# Patient Record
Sex: FEMALE | Race: WHITE | NOT HISPANIC OR LATINO | Employment: OTHER | URBAN - METROPOLITAN AREA
[De-identification: names, ages, dates, MRNs, and addresses within clinical notes are randomized per-mention and may not be internally consistent; named-entity substitution may affect disease eponyms.]

---

## 2017-03-15 PROBLEM — M25.561 ACUTE PAIN OF RIGHT KNEE: Status: ACTIVE | Noted: 2017-03-15

## 2017-03-28 PROBLEM — S83.271A COMPLEX TEAR OF LATERAL MENISCUS OF RIGHT KNEE AS CURRENT INJURY: Status: ACTIVE | Noted: 2017-03-28

## 2018-03-20 PROBLEM — K20.0 EOSINOPHILIC ESOPHAGITIS: Status: ACTIVE | Noted: 2018-03-20

## 2018-03-20 PROBLEM — R10.9 ABDOMINAL SPASMS: Status: ACTIVE | Noted: 2018-03-20

## 2018-03-20 PROBLEM — I10 BENIGN ESSENTIAL HTN: Status: ACTIVE | Noted: 2018-03-20

## 2018-03-20 PROBLEM — E78.5 HYPERLIPIDEMIA: Status: ACTIVE | Noted: 2018-03-20

## 2018-03-20 PROBLEM — K21.9 GERD (GASTROESOPHAGEAL REFLUX DISEASE): Status: ACTIVE | Noted: 2018-03-20

## 2018-03-20 PROBLEM — J45.20 MILD INTERMITTENT ASTHMA WITHOUT COMPLICATION: Status: ACTIVE | Noted: 2018-03-20

## 2018-03-20 PROBLEM — F41.8 SITUATIONAL ANXIETY: Status: ACTIVE | Noted: 2018-03-20

## 2018-03-20 PROBLEM — Z78.0 MENOPAUSE: Status: ACTIVE | Noted: 2018-03-20

## 2018-03-28 PROBLEM — M85.89 OSTEOPENIA OF MULTIPLE SITES: Status: ACTIVE | Noted: 2018-03-28

## 2019-02-12 PROBLEM — I70.0 ATHEROSCLEROSIS OF ABDOMINAL AORTA: Status: ACTIVE | Noted: 2019-02-12

## 2019-03-28 ENCOUNTER — TELEPHONE (OUTPATIENT)
Dept: OTOLARYNGOLOGY | Facility: CLINIC | Age: 74
End: 2019-03-28

## 2019-03-28 NOTE — TELEPHONE ENCOUNTER
----- Message from Kelly Avila sent at 3/28/2019  3:30 PM CDT -----  Contact: self   Patient want to speak with a nurse regarding scheduling appointment today for dizziness please call back at [phone] (home)

## 2019-04-01 ENCOUNTER — OFFICE VISIT (OUTPATIENT)
Dept: OTOLARYNGOLOGY | Facility: CLINIC | Age: 74
End: 2019-04-01
Payer: MEDICARE

## 2019-04-01 ENCOUNTER — CLINICAL SUPPORT (OUTPATIENT)
Dept: AUDIOLOGY | Facility: CLINIC | Age: 74
End: 2019-04-01
Payer: MEDICARE

## 2019-04-01 VITALS
BODY MASS INDEX: 27.55 KG/M2 | HEIGHT: 62 IN | SYSTOLIC BLOOD PRESSURE: 125 MMHG | WEIGHT: 149.69 LBS | DIASTOLIC BLOOD PRESSURE: 80 MMHG

## 2019-04-01 DIAGNOSIS — H90.3 BILATERAL HIGH FREQUENCY SENSORINEURAL HEARING LOSS: Primary | ICD-10-CM

## 2019-04-01 DIAGNOSIS — J30.9 ALLERGIC RHINITIS, UNSPECIFIED SEASONALITY, UNSPECIFIED TRIGGER: ICD-10-CM

## 2019-04-01 DIAGNOSIS — H93.12 TINNITUS, LEFT: ICD-10-CM

## 2019-04-01 DIAGNOSIS — H93.19 TINNITUS, UNSPECIFIED LATERALITY: Primary | ICD-10-CM

## 2019-04-01 DIAGNOSIS — H93.12 LEFT-SIDED TINNITUS: ICD-10-CM

## 2019-04-01 PROCEDURE — 3074F PR MOST RECENT SYSTOLIC BLOOD PRESSURE < 130 MM HG: ICD-10-PCS | Mod: CPTII,S$GLB,, | Performed by: NURSE PRACTITIONER

## 2019-04-01 PROCEDURE — 3079F PR MOST RECENT DIASTOLIC BLOOD PRESSURE 80-89 MM HG: ICD-10-PCS | Mod: CPTII,S$GLB,, | Performed by: NURSE PRACTITIONER

## 2019-04-01 PROCEDURE — 99999 PR PBB SHADOW E&M-EST. PATIENT-LVL I: ICD-10-PCS | Mod: PBBFAC,,,

## 2019-04-01 PROCEDURE — 92557 PR COMPREHENSIVE HEARING TEST: ICD-10-PCS | Mod: S$GLB,,, | Performed by: AUDIOLOGIST-HEARING AID FITTER

## 2019-04-01 PROCEDURE — 92557 COMPREHENSIVE HEARING TEST: CPT | Mod: S$GLB,,, | Performed by: AUDIOLOGIST-HEARING AID FITTER

## 2019-04-01 PROCEDURE — 99999 PR PBB SHADOW E&M-EST. PATIENT-LVL I: CPT | Mod: PBBFAC,,,

## 2019-04-01 PROCEDURE — 3079F DIAST BP 80-89 MM HG: CPT | Mod: CPTII,S$GLB,, | Performed by: NURSE PRACTITIONER

## 2019-04-01 PROCEDURE — 99999 PR PBB SHADOW E&M-EST. PATIENT-LVL IV: CPT | Mod: PBBFAC,,, | Performed by: NURSE PRACTITIONER

## 2019-04-01 PROCEDURE — 99203 PR OFFICE/OUTPT VISIT, NEW, LEVL III, 30-44 MIN: ICD-10-PCS | Mod: S$GLB,,, | Performed by: NURSE PRACTITIONER

## 2019-04-01 PROCEDURE — 1101F PR PT FALLS ASSESS DOC 0-1 FALLS W/OUT INJ PAST YR: ICD-10-PCS | Mod: CPTII,S$GLB,, | Performed by: NURSE PRACTITIONER

## 2019-04-01 PROCEDURE — 99999 PR PBB SHADOW E&M-EST. PATIENT-LVL IV: ICD-10-PCS | Mod: PBBFAC,,, | Performed by: NURSE PRACTITIONER

## 2019-04-01 PROCEDURE — 92567 TYMPANOMETRY: CPT | Mod: S$GLB,,, | Performed by: AUDIOLOGIST-HEARING AID FITTER

## 2019-04-01 PROCEDURE — 1101F PT FALLS ASSESS-DOCD LE1/YR: CPT | Mod: CPTII,S$GLB,, | Performed by: NURSE PRACTITIONER

## 2019-04-01 PROCEDURE — 92567 PR TYMPA2METRY: ICD-10-PCS | Mod: S$GLB,,, | Performed by: AUDIOLOGIST-HEARING AID FITTER

## 2019-04-01 PROCEDURE — 3074F SYST BP LT 130 MM HG: CPT | Mod: CPTII,S$GLB,, | Performed by: NURSE PRACTITIONER

## 2019-04-01 PROCEDURE — 99203 OFFICE O/P NEW LOW 30 MIN: CPT | Mod: S$GLB,,, | Performed by: NURSE PRACTITIONER

## 2019-04-01 NOTE — PATIENT INSTRUCTIONS
Tinnitus (Ringing in the Ears)     Treatment may include maskers and hearing aids.     Tinnitus is the term for a noise in your ear not caused by an outside sound. The noise might be a ringing, clicking, hiss, or roar. It can vary in pitch and may be soft or quite loud. For some people, tinnitus is a minor nuisance. But for others, the noise can make it hard to hear, work, and even sleep. When tinnitus can't be cured, a number of treatments may offer relief.  What causes tinnitus?  Loud noises, hearing loss, and ear wax can cause tinnitus. So can certain medicines. Large amounts of aspirin or caffeine are sometimes to blame. In many cases, the exact cause of tinnitus is unknown.  How is tinnitus treated?  Identifying and removing the cause is the best way to treat tinnitus. For that reason, your healthcare provider may refer you to an otolaryngologist (ear, nose, and throat doctor). Your hearing may also be checked by an audiologist (hearing specialist). If you have hearing loss, wearing a hearing aid may help your tinnitus. When the cause can't be found, the tinnitus itself may be treated. Some of the treatments are listed below, and your healthcare provider can tell you more about them:  · Maskers are small devices that look like hearing aids. They emit a pleasant sound that helps cover up the ringing in your ears. Hearing aids and maskers are sometimes used together.  · Medicines that treat anxiety and depression may ease tinnitus in some people.  · Hypnosis or relaxation therapy may help head noise seem less severe.  · Tinnitus retraining therapy combines counseling and maskers. Both can help take your mind off the tinnitus.  Tinnitus measures:  1.  Avoid exposure to loud sounds and noises.  Wear ear protection around loud noises.  2.  Get your blood pressure check regularly.  If it is high, see your doctor.  3.  Decrease salt intake.  4.  Avoid stimulants such as caffeine and tobacco.  5.  Exercise daily to  "improve circulation.  6.  Get adequate rest.  Avoid fatigue.  7.  Stop worrying about the noise.  Recognize the noise as an annoyance and learned to ignore it as much as possible.  8.  Consider a trial of lipoflavanoids, slow-release niacin, or Arches' Tinnitus Formula (over-the-counter).  9.  Purchase a white noise machine to mask tinnitus.  · 10. Tinnitus Retraining Therapy by an audiologist certified in TRT.  For more information  · American Speech-Hearing-Language Association [phone] www.pascual.org  · American Tinnitus Association [phone] www.shea.org  · National [place] on Deafness and other Communication Disorders [phone] www.nidcd.nih.gov     DIFFERENT TYPES OF NASAL SPRAYS:  · Flonase / fluticasone (steroid spray) is best for stuffy, pressure, fullness.  · Astelin / azelastine (antihistamine spray) is best for itchy, drippy, sneezy.  · Atrovent / ipratropium is best for chronic watery nasal drip ("leaky faucet" nose), which may worsen with eating.    Nasal spray instructions:  Blow nose first gently to clean. Keep chin level with the floor (do not tilt head forward or back). Insert nasal spray taking caution to direct it AWAY from the middle wall inside the nose (septum) to avoid irritating nasal septum which could cause nosebleed.  Do not tilt spray up but rather flat and out along the roof of your mouth to spray. Angle the tip of the spray out slightly toward the direction of the ears; then spray. Do not take quick vigorous sniff but rather slow gentle inhalation while waiting for medication to absorb into nasal passages. Then administer second spray in same way.     Nasal saline rinse kit (use Neti pot or [employer] sinus rinse kit) -- use sterile water (boiled tap water which has cooled) or distilled bottled water. Add 1/4 teaspoon sea salt and a pinch of baking soda or a mixture packet from the maker of your sinus rinse kit.  Rinse through both sides of nose to cleanse sinus and nasal " passages, bending forward with head tilted down. Keep your mouth open, without holding your breath. Squeeze bottle gently until solution starts draining from the opposite nasal passage. After bottle is empty, blow nose very gently, without pinching nose completely, to avoid pressure on eardrums.  There are useful YouTube videos that show demonstration of how to do these properly.     Ponaris Nasal Emollient is used for the relief of: nasal congestion due to colds, nasal irritation, allergy exacerbations, nasal crusting. Specifically prepared iodized organic oils of pine, eucalyptus, peppermint, cajeput, and cottonseed. To order Ponaris: ask your pharmacist to order it for you or we carry it in our pharmacy downstairs on the first floor.       Understanding Nasal Allergies  Nasal allergies (also called allergic rhinitis) are a common health problem. They may be seasonal. This means they cause symptoms only at certain times of the year. Or they may be perennial. This means they cause symptoms all year long. Other health problems, such as asthma, often occur along with allergies as well.    What is an allergic reaction?  An allergy is a reaction to a substance called an allergen. Common allergens include:  · Wind-borne pollen  · Mold  · Dust mites  · Furry and feathered animals  · Cockroaches  Normally, allergens are harmless. But when a person has allergies, the body thinks they are harmful. The body then attacks allergens with antibodies. Antibodies are attached to special cells called mast cells. Allergens stick to the antibodies. This makes the mast cells release histamine and other chemicals. This is an allergic reaction. The chemicals irritate nearby nasal tissue. This causes nasal allergy symptoms.  Common nasal allergy symptoms  Allergies can cause nasal tissue to swell. This makes the air passages smaller. The nose may feel stuffed up. The nose may also make extra mucus, which can plug the nasal passages or  drip out of the nose. Mucus can drip down the back of the throat (postnasal drip) as well. Sinus tissue can swell. This may cause pain and headache. Common allergy symptoms include:  · Runny nose with clear, watery discharge  · Stuffy nose (nasal congestion)  · Drainage down your throat (postnasal drip)  · Sneezing  · Red, watery eyes  · Itchy nose, eyes, ears, and throat  · Plugged-up ears (ear congestion)  · Sore throat  · Coughing  · Sinus pain and swelling  · Headache  It may not be allergies  Other health problems can cause symptoms like those of nasal allergies. These include:  · Nonallergic rhinitis and viruses such as colds  · Irritants and pollutants, such as strong odors or smoke  · Certain medicines  · Changes in the weather   Treatment  Your healthcare provider will evaluate you to find the cause of your symptoms then recommend treatment. If your symptoms are due to nasal allergies, your healthcare provider may prescribe nasal steroid sprays or oral antihistamines to help reduce symptoms. Avoidance of the allergen will also be suggested. You may also be referred to an allergist.   Date Last Reviewed: 10/1/2016  © 7206-1637 [employer]. [address] [place] [address], [place] [address]. All rights reserved. This information is not intended as a substitute for professional medical care. Always follow your healthcare professional's instructions.

## 2019-04-01 NOTE — PROGRESS NOTES
Elham Arteaga was seen 04/01/2019 for an audiological evaluation. Patient complains of hearing loss and tinnitus AS since last Thursday, 3/28/19. Pt reports a moderate hearing loss Dx by Dr Nguyen on 9/12/16.     Results reveal a normal through 1.5K Hz sloping to moderate HF sensorineural hearing loss for the right ear, and  Normal through 1.5KHz sloping to moderately severe HF sensorineural hearing loss for the left ear.    Speech Reception Thresholds were  10 dBHL for the right ear and 5 dBHL for the left ear.    Word recognition scores were excellent for the right ear and excellent for the left ear.   Tympanograms were Type A for the right ear and Type A for the left ear.    Audiogram results were reviewed in detail with patient and all questions were answered. Results will be reviewed by ENT at the completion of this note. Recommend further medical eval for the asymmetry, binaural amplification pending medical clearance, repeat hearing test in one year due to asymmetry and hearing protection in loud noise.

## 2019-04-01 NOTE — PROGRESS NOTES
Subjective:       Patient ID: Elham Arteaga is a 73 y.o. female.    Chief Complaint: Tinnitus (allergies)    HPI   Patient states she noticed loud tinnitus AS after waking on Thursday. Lasted hours. The next day it was barely noticeable. She discussed it with her PCP who suspects it may have been related to several antibiotics she had to take this winter for protracted URIs. She states she has always had terrible allergies, but they seem to be worse this year. 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     Review of Systems   Constitutional: Negative.    HENT: Positive for tinnitus.    Eyes: Negative.    Respiratory: Negative.    Cardiovascular: Negative.    Gastrointestinal: Negative.    Musculoskeletal: Negative.    Skin: Negative.    Neurological: Negative.    Hematological: Negative.    Psychiatric/Behavioral: Negative.        Objective:      Physical Exam   Constitutional: She is oriented to person, place, and time. Vital signs are normal. She appears well-developed and well-nourished. She is cooperative. She does not appear ill. No distress.   HENT:   Head: Normocephalic and atraumatic.   Right Ear: Hearing, tympanic membrane, external ear and ear canal normal. Tympanic membrane is not erythematous. No middle ear effusion.   Left Ear: Hearing, tympanic membrane, external ear and ear canal normal. Tympanic membrane is not erythematous.  No middle ear effusion.   Nose: Nose normal.   Mouth/Throat: Uvula is midline, oropharynx is clear and moist and mucous membranes are normal.   Eyes: EOM and lids are normal. Right eye exhibits no discharge. Left eye exhibits no discharge. No scleral icterus.   Neck: Trachea normal and normal range of motion. Neck supple. No tracheal deviation present.   Cardiovascular: Normal rate.   Pulmonary/Chest: Effort normal. No stridor. No respiratory distress. She has no wheezes.   Musculoskeletal: Normal range of  motion.   Neurological: She is alert and oriented to person, place, and time. She has normal strength. Coordination and gait normal.   Skin: Skin is warm, dry and intact. She is not diaphoretic. No cyanosis. No pallor.   Psychiatric: She has a normal mood and affect. Her speech is normal and behavior is normal. Judgment and thought content normal. Cognition and memory are normal.   Nursing note and vitals reviewed.      Assessment:     Asymmetrical high-frequency SNHL    Negative aural exam  Allergic rhinitis  Plan:     Repeat audiogram in one year to monitor asymmetry    Tinnitus handout given and discussed at length. Discussed elimination of exacerbating factors such as elevated blood pressure, high sodium intake, caffeine/stimulants, sleep deprivation/insomnia, certain medications, exposure to loud sounds, etc. Discussed white noise, hearing aids w/masking technology, and tinnitus retraining therapy (TRT). All questions answered.   PATIENT IS MEDICALLY CLEARED FOR HEARING AIDS.   Today's audiogram reveals the patient is a candidate for amplification. Audiogram is reviewed in detail with the patient. The audiologist's recommendation that the patient have amplification/hearing aids is discussed and questions answered. Patient has been given information by the audiologist on how to schedule a hearing aid consultation. Patient is encouraged to wear ear protection in loud noise and return annually for hearing test.   Flonase and Astelin. Nasal saline rinsing.   Return to clinic as needed for further ENT concerns.

## 2019-05-13 PROBLEM — Z88.8 ALLERGY TO STATIN MEDICATION: Status: ACTIVE | Noted: 2019-05-13

## 2019-05-13 PROBLEM — F32.9 REACTIVE DEPRESSION: Status: ACTIVE | Noted: 2019-05-13

## 2019-08-23 ENCOUNTER — LAB VISIT (OUTPATIENT)
Dept: LAB | Facility: HOSPITAL | Age: 74
End: 2019-08-23
Attending: PHYSICIAN ASSISTANT
Payer: MEDICARE

## 2019-08-23 ENCOUNTER — TELEPHONE (OUTPATIENT)
Dept: RHEUMATOLOGY | Facility: CLINIC | Age: 74
End: 2019-08-23

## 2019-08-23 ENCOUNTER — TELEPHONE (OUTPATIENT)
Dept: ENDOCRINOLOGY | Facility: CLINIC | Age: 74
End: 2019-08-23

## 2019-08-23 ENCOUNTER — OFFICE VISIT (OUTPATIENT)
Dept: ENDOCRINOLOGY | Facility: CLINIC | Age: 74
End: 2019-08-23
Payer: MEDICARE

## 2019-08-23 VITALS
SYSTOLIC BLOOD PRESSURE: 128 MMHG | TEMPERATURE: 98 F | HEART RATE: 96 BPM | WEIGHT: 151.38 LBS | HEIGHT: 62 IN | DIASTOLIC BLOOD PRESSURE: 78 MMHG | BODY MASS INDEX: 27.86 KG/M2

## 2019-08-23 DIAGNOSIS — E78.5 HYPERLIPIDEMIA, UNSPECIFIED HYPERLIPIDEMIA TYPE: ICD-10-CM

## 2019-08-23 DIAGNOSIS — E03.9 HYPOTHYROIDISM, UNSPECIFIED TYPE: ICD-10-CM

## 2019-08-23 DIAGNOSIS — Z13.220 SCREENING CHOLESTEROL LEVEL: ICD-10-CM

## 2019-08-23 DIAGNOSIS — E83.52 HYPERCALCEMIA: ICD-10-CM

## 2019-08-23 DIAGNOSIS — E04.1 NODULAR THYROID DISEASE: ICD-10-CM

## 2019-08-23 DIAGNOSIS — E55.9 HYPOVITAMINOSIS D: ICD-10-CM

## 2019-08-23 DIAGNOSIS — E83.52 HYPERCALCEMIA: Primary | ICD-10-CM

## 2019-08-23 DIAGNOSIS — M25.50 ARTHRALGIA, UNSPECIFIED JOINT: ICD-10-CM

## 2019-08-23 LAB
ALBUMIN SERPL BCP-MCNC: 3.9 G/DL (ref 3.5–5.2)
ALP SERPL-CCNC: 131 U/L (ref 55–135)
ALT SERPL W/O P-5'-P-CCNC: 17 U/L (ref 10–44)
ANION GAP SERPL CALC-SCNC: 7 MMOL/L (ref 8–16)
AST SERPL-CCNC: 15 U/L (ref 10–40)
BASOPHILS # BLD AUTO: 0.05 K/UL (ref 0–0.2)
BASOPHILS NFR BLD: 0.5 % (ref 0–1.9)
BILIRUB SERPL-MCNC: 0.5 MG/DL (ref 0.1–1)
BUN SERPL-MCNC: 20 MG/DL (ref 8–23)
CA-I BLDV-SCNC: 1.35 MMOL/L (ref 1.06–1.42)
CALCIUM SERPL-MCNC: 10.3 MG/DL (ref 8.7–10.5)
CHLORIDE SERPL-SCNC: 104 MMOL/L (ref 95–110)
CHOLEST SERPL-MCNC: 235 MG/DL (ref 120–199)
CHOLEST/HDLC SERPL: 4.4 {RATIO} (ref 2–5)
CO2 SERPL-SCNC: 28 MMOL/L (ref 23–29)
CREAT SERPL-MCNC: 0.8 MG/DL (ref 0.5–1.4)
DIFFERENTIAL METHOD: ABNORMAL
EOSINOPHIL # BLD AUTO: 0.3 K/UL (ref 0–0.5)
EOSINOPHIL NFR BLD: 2.9 % (ref 0–8)
ERYTHROCYTE [DISTWIDTH] IN BLOOD BY AUTOMATED COUNT: 13.7 % (ref 11.5–14.5)
EST. GFR  (AFRICAN AMERICAN): >60 ML/MIN/1.73 M^2
EST. GFR  (NON AFRICAN AMERICAN): >60 ML/MIN/1.73 M^2
GLUCOSE SERPL-MCNC: 104 MG/DL (ref 70–110)
HCT VFR BLD AUTO: 44.9 % (ref 37–48.5)
HDLC SERPL-MCNC: 54 MG/DL (ref 40–75)
HDLC SERPL: 23 % (ref 20–50)
HGB BLD-MCNC: 13.6 G/DL (ref 12–16)
IMM GRANULOCYTES # BLD AUTO: 0.03 K/UL (ref 0–0.04)
IMM GRANULOCYTES NFR BLD AUTO: 0.3 % (ref 0–0.5)
LDLC SERPL CALC-MCNC: 129.2 MG/DL (ref 63–159)
LYMPHOCYTES # BLD AUTO: 2.2 K/UL (ref 1–4.8)
LYMPHOCYTES NFR BLD: 24.3 % (ref 18–48)
MAGNESIUM SERPL-MCNC: 2.1 MG/DL (ref 1.6–2.6)
MCH RBC QN AUTO: 27.6 PG (ref 27–31)
MCHC RBC AUTO-ENTMCNC: 30.3 G/DL (ref 32–36)
MCV RBC AUTO: 91 FL (ref 82–98)
MONOCYTES # BLD AUTO: 0.6 K/UL (ref 0.3–1)
MONOCYTES NFR BLD: 6.4 % (ref 4–15)
NEUTROPHILS # BLD AUTO: 6 K/UL (ref 1.8–7.7)
NEUTROPHILS NFR BLD: 65.6 % (ref 38–73)
NONHDLC SERPL-MCNC: 181 MG/DL
NRBC BLD-RTO: 0 /100 WBC
PHOSPHATE SERPL-MCNC: 3.5 MG/DL (ref 2.7–4.5)
PLATELET # BLD AUTO: 298 K/UL (ref 150–350)
PMV BLD AUTO: 9.2 FL (ref 9.2–12.9)
POTASSIUM SERPL-SCNC: 4.3 MMOL/L (ref 3.5–5.1)
PROT SERPL-MCNC: 7.2 G/DL (ref 6–8.4)
PTH-INTACT SERPL-MCNC: 84 PG/ML (ref 9–77)
RBC # BLD AUTO: 4.93 M/UL (ref 4–5.4)
SODIUM SERPL-SCNC: 139 MMOL/L (ref 136–145)
T3 SERPL-MCNC: 94 NG/DL (ref 60–180)
T4 FREE SERPL-MCNC: 0.95 NG/DL (ref 0.71–1.51)
THYROPEROXIDASE IGG SERPL-ACNC: <6 IU/ML
TRIGL SERPL-MCNC: 259 MG/DL (ref 30–150)
TSH SERPL DL<=0.005 MIU/L-ACNC: 1.55 UIU/ML (ref 0.4–4)
WBC # BLD AUTO: 9.21 K/UL (ref 3.9–12.7)

## 2019-08-23 PROCEDURE — 99214 OFFICE O/P EST MOD 30 MIN: CPT | Mod: S$GLB,,, | Performed by: PHYSICIAN ASSISTANT

## 2019-08-23 PROCEDURE — 3078F DIAST BP <80 MM HG: CPT | Mod: CPTII,S$GLB,, | Performed by: PHYSICIAN ASSISTANT

## 2019-08-23 PROCEDURE — 3074F PR MOST RECENT SYSTOLIC BLOOD PRESSURE < 130 MM HG: ICD-10-PCS | Mod: CPTII,S$GLB,, | Performed by: PHYSICIAN ASSISTANT

## 2019-08-23 PROCEDURE — 83735 ASSAY OF MAGNESIUM: CPT

## 2019-08-23 PROCEDURE — 86376 MICROSOMAL ANTIBODY EACH: CPT

## 2019-08-23 PROCEDURE — 1101F PT FALLS ASSESS-DOCD LE1/YR: CPT | Mod: CPTII,S$GLB,, | Performed by: PHYSICIAN ASSISTANT

## 2019-08-23 PROCEDURE — 99214 PR OFFICE/OUTPT VISIT, EST, LEVL IV, 30-39 MIN: ICD-10-PCS | Mod: S$GLB,,, | Performed by: PHYSICIAN ASSISTANT

## 2019-08-23 PROCEDURE — 82330 ASSAY OF CALCIUM: CPT

## 2019-08-23 PROCEDURE — 84443 ASSAY THYROID STIM HORMONE: CPT

## 2019-08-23 PROCEDURE — 99999 PR PBB SHADOW E&M-EST. PATIENT-LVL IV: ICD-10-PCS | Mod: PBBFAC,,, | Performed by: PHYSICIAN ASSISTANT

## 2019-08-23 PROCEDURE — 84432 ASSAY OF THYROGLOBULIN: CPT

## 2019-08-23 PROCEDURE — 85025 COMPLETE CBC W/AUTO DIFF WBC: CPT

## 2019-08-23 PROCEDURE — 1101F PR PT FALLS ASSESS DOC 0-1 FALLS W/OUT INJ PAST YR: ICD-10-PCS | Mod: CPTII,S$GLB,, | Performed by: PHYSICIAN ASSISTANT

## 2019-08-23 PROCEDURE — 84480 ASSAY TRIIODOTHYRONINE (T3): CPT

## 2019-08-23 PROCEDURE — 3074F SYST BP LT 130 MM HG: CPT | Mod: CPTII,S$GLB,, | Performed by: PHYSICIAN ASSISTANT

## 2019-08-23 PROCEDURE — 3078F PR MOST RECENT DIASTOLIC BLOOD PRESSURE < 80 MM HG: ICD-10-PCS | Mod: CPTII,S$GLB,, | Performed by: PHYSICIAN ASSISTANT

## 2019-08-23 PROCEDURE — 83970 ASSAY OF PARATHORMONE: CPT

## 2019-08-23 PROCEDURE — 80053 COMPREHEN METABOLIC PANEL: CPT

## 2019-08-23 PROCEDURE — 80061 LIPID PANEL: CPT

## 2019-08-23 PROCEDURE — 84100 ASSAY OF PHOSPHORUS: CPT

## 2019-08-23 PROCEDURE — 84439 ASSAY OF FREE THYROXINE: CPT

## 2019-08-23 PROCEDURE — 99999 PR PBB SHADOW E&M-EST. PATIENT-LVL IV: CPT | Mod: PBBFAC,,, | Performed by: PHYSICIAN ASSISTANT

## 2019-08-23 PROCEDURE — 36415 COLL VENOUS BLD VENIPUNCTURE: CPT | Mod: PO

## 2019-08-23 RX ORDER — LANOLIN ALCOHOL/MO/W.PET/CERES
100 CREAM (GRAM) TOPICAL DAILY
COMMUNITY
End: 2021-12-31

## 2019-08-23 RX ORDER — HYDROGEN PEROXIDE 3 %
20 SOLUTION, NON-ORAL MISCELLANEOUS DAILY
COMMUNITY
End: 2021-08-16

## 2019-08-23 NOTE — TELEPHONE ENCOUNTER
Patient has a referral to Rheumatology. Has joint pain. Wants to be seen in [place]. Not able to book anything. Would you please call patient to make a appointment.

## 2019-08-23 NOTE — PROGRESS NOTES
"CC: Hypothyroidism/Hypercalcemia    HPI: Elham Arteaga is a 73 y.o. female here for hypercalcemia/hypothyroidism along with other conditions listed in the Visit Diagnosis. Diagnosed with hypothyroidism in 2014. Noticed her ca was elevated in 5/19 at 10.5 by her PCP. Ca found to be 10.0% a few days later. +FHx of thyroid disease in her mother (goiter) and aunt. + MTHFR mutation (2 copies of the same mutation).  Previous pt of Dr. Nance. She has vb12 deficiency and takes 1000 mcg daily. Complains of nausea, fatigue, muscle aches, numbness and tingling in fingers and feet, memory fog, bentyl. Taking 0.25 mg M-F. Not taking vitamin d. Levothyroxine is Timeful, sends prescription to [facility] pharmacy. No hx of kidney stones. Avoids milk and cheese due to lactose intolerance.  Thyroid u/s 8/18 showed a 4 mm nodule in the isthmus.    DEXA 3/18: osteopenia at [facility]. No falls, fractures or steriod injections. Not taking ca or vd.    PMHx, PSHx: reviewed in epic.    Social Hx: no ETOH use. Social smoker in the past.     ROS:   Constitutional: No recent significant weight change  Eyes: No recent visual changes  Cardiovascular: Denies current anginal symptoms  Respiratory: Denies current respiratory difficulty  Gastrointestinal: Denies recent bowel disturbances  GenitoUrinary - No dysuria  Skin: No new skin rash  Musc: + back and hip pain  Neurologic: No focal neurologic complaints  Remainder ROS negative     /78 (BP Location: Left arm, Patient Position: Sitting, BP Method: Medium (Manual))   Pulse 96   Temp 97.6 °F (36.4 °C) (Oral)   Ht 5' 2" (1.575 m)   Wt 68.6 kg (151 lb 5.5 oz)   BMI 27.68 kg/m²      Personally reviewed labs below:    5/27/19 [facility]  PTHi 62  TSH 0.37  FT3 3.7  FT4 1.3  vd 28  cmp wnl  Lab Results   Component Value Date    TSH 1.548 08/23/2019    Q0VRCMG 94 08/23/2019    FREET4 0.95 08/23/2019        Lab Results   Component Value Date    PTH 84.0 (H) 08/23/2019    CALCIUM 10.3 08/23/2019    CAION " 1.35 08/23/2019    PHOS 3.5 08/23/2019        Chemistry        Component Value Date/Time     05/23/2019 2110    K 4.2 05/23/2019 2110     05/23/2019 2110    CO2 27 05/23/2019 2110    BUN 20 (H) 05/23/2019 2110    CREATININE 0.67 05/23/2019 2110    GLU 95 05/23/2019 2110        Component Value Date/Time    CALCIUM 10.3 08/23/2019 1142    ALKPHOS 131 08/23/2019 1142    AST 15 08/23/2019 1142    ALT 17 08/23/2019 1142    BILITOT 0.5 08/23/2019 1142    ESTGFRAFRICA >60.0 08/23/2019 1142    EGFRNONAA >60.0 08/23/2019 1142           No results found for: HGBA1C     PE:  GENERAL: elderly female, well developed, well nourished  NECK: Supple neck, normal thyroid. No bruit  LYMPHATIC: No cervical or supraclavicular lymphadenopathy  CARDIOVASCULAR: Normal heart sounds, no pedal edema  RESPIRATORY: Normal effort, clear to auscultation  ABDOMEN: soft, non-tender, non-distended.  FEET: appropriate footwear.     Assessment/Plan:   1. Hypercalcemia  T4, free    T3    TSH    Thyroid peroxidase antibody    Thyroglobulin    Comprehensive metabolic panel    CBC auto differential    PTH, intact    Calcium, ionized    Magnesium    Phosphorus   2. Hypothyroidism, unspecified type     3. Screening cholesterol level     4. Hyperlipidemia, unspecified hyperlipidemia type  Lipid panel   5. Nodular thyroid disease  US Soft Tissue Head Neck Thyroid   6. Hypovitaminosis D     7. Arthralgia, unspecified joint  Ambulatory Referral to Rheumatology       Hypercalcemia-recheck pth   Hypothyroidism-TFTs today. Continue current LT4 dose.  Cholesterol-check lp  Nodular thyroid disease-repeat thyroid u/s  Hypovitaminosis d-low-restart otc vd  Arthraglia-referral to rheumaotlogy    F/u in 6 mths

## 2019-08-23 NOTE — TELEPHONE ENCOUNTER
Pt given phone number to [facility]. She declined Dr. De La Torre's next available appt

## 2019-08-24 DIAGNOSIS — E83.52 HYPERCALCEMIA: Primary | ICD-10-CM

## 2019-08-24 DIAGNOSIS — E21.3 HYPERPARATHYROIDISM: ICD-10-CM

## 2019-08-26 LAB
THRYOGLOBULIN INTERPRETATION: ABNORMAL
THYROGLOB AB SERPL-ACNC: <1.8 IU/ML
THYROGLOB SERPL-MCNC: 3 NG/ML

## 2019-08-27 ENCOUNTER — HOSPITAL ENCOUNTER (OUTPATIENT)
Dept: RADIOLOGY | Facility: HOSPITAL | Age: 74
Discharge: HOME | End: 2019-08-27
Attending: PHYSICIAN ASSISTANT
Payer: MEDICARE

## 2019-08-27 DIAGNOSIS — E04.1 NODULAR THYROID DISEASE: ICD-10-CM

## 2019-08-27 PROCEDURE — 76536 US SOFT TISSUE HEAD NECK THYROID: ICD-10-PCS | Mod: 26,,, | Performed by: RADIOLOGY

## 2019-08-27 PROCEDURE — 76536 US EXAM OF HEAD AND NECK: CPT | Mod: 26,,, | Performed by: RADIOLOGY

## 2019-08-27 PROCEDURE — 76536 US EXAM OF HEAD AND NECK: CPT | Mod: TC,PO

## 2019-08-29 ENCOUNTER — TELEPHONE (OUTPATIENT)
Dept: ENDOCRINOLOGY | Facility: CLINIC | Age: 74
End: 2019-08-29

## 2019-08-29 NOTE — TELEPHONE ENCOUNTER
----- Message from Hannah Denise MA sent at 8/29/2019  4:52 PM CDT -----  Contact: self      ----- Message -----  From: Glenis Mehta  Sent: 8/29/2019  11:01 AM  To: Toña Sullivan Staff    Type:  Patient Returning Call    Who Called:  self  Who Left Message for Patient:  Luis  Does the patient know what this is regarding?:  yes  Best Call Back Number:  [phone] (home)   Additional Information:  Patient has a few more questions regarding the results you called her with on 08/27/19. Please call patient. Thanks!

## 2019-08-29 NOTE — TELEPHONE ENCOUNTER
Called patient and informed her of the results of her recent blood work and ultrasound. Patient had questions that were answered. Patient verbalized understanding, and will turn in 24 hour urine in the next few days.

## 2019-09-04 ENCOUNTER — LAB VISIT (OUTPATIENT)
Dept: LAB | Facility: HOSPITAL | Age: 74
End: 2019-09-04
Attending: INTERNAL MEDICINE
Payer: MEDICARE

## 2019-09-04 DIAGNOSIS — E21.3 HYPERPARATHYROIDISM: ICD-10-CM

## 2019-09-04 PROCEDURE — 82340 ASSAY OF CALCIUM IN URINE: CPT

## 2019-09-04 PROCEDURE — 82570 ASSAY OF URINE CREATININE: CPT

## 2019-09-05 LAB
CALCIUM 24H UR-MRATE: 4 MG/HR (ref 4–12)
CALCIUM UR-MCNC: 5.3 MG/DL (ref 0–15)
CALCIUM URINE (MG/SPEC): 93 MG/SPEC
CREAT 24H UR-MRATE: 31.4 MG/HR (ref 40–75)
CREAT UR-MCNC: 43 MG/DL (ref 15–325)
CREATININE, URINE (MG/SPEC): 752.5 MG/SPEC
URINE COLLECTION DURATION: 24 HR
URINE COLLECTION DURATION: 24 HR
URINE VOLUME: 1750 ML
URINE VOLUME: 1750 ML

## 2019-11-21 ENCOUNTER — TELEPHONE (OUTPATIENT)
Dept: RHEUMATOLOGY | Facility: CLINIC | Age: 74
End: 2019-11-21

## 2019-11-21 NOTE — TELEPHONE ENCOUNTER
----- Message from Cecily  sent at 11/21/2019  9:58 AM CST -----  Contact: pt  Type: Needs Medical Advice    Who Called:  pt    Best Call Back Number:   Additional Information: Requesting a call back from Nurse, regarding access to an appt pt will be a NP ,please call back with advice

## 2019-12-01 PROBLEM — E55.9 VITAMIN D DEFICIENCY: Chronic | Status: ACTIVE | Noted: 2019-12-01

## 2019-12-11 ENCOUNTER — TELEPHONE (OUTPATIENT)
Dept: ENDOCRINOLOGY | Facility: CLINIC | Age: 74
End: 2019-12-11

## 2020-02-03 ENCOUNTER — OFFICE VISIT (OUTPATIENT)
Dept: ENDOCRINOLOGY | Facility: CLINIC | Age: 75
End: 2020-02-03
Payer: MEDICARE

## 2020-02-03 ENCOUNTER — LAB VISIT (OUTPATIENT)
Dept: LAB | Facility: HOSPITAL | Age: 75
End: 2020-02-03
Attending: INTERNAL MEDICINE
Payer: MEDICARE

## 2020-02-03 VITALS
WEIGHT: 144.5 LBS | BODY MASS INDEX: 26.59 KG/M2 | HEIGHT: 62 IN | DIASTOLIC BLOOD PRESSURE: 88 MMHG | TEMPERATURE: 98 F | HEART RATE: 90 BPM | SYSTOLIC BLOOD PRESSURE: 144 MMHG

## 2020-02-03 DIAGNOSIS — E83.52 HYPERCALCEMIA: ICD-10-CM

## 2020-02-03 DIAGNOSIS — I70.0 ATHEROSCLEROSIS OF ABDOMINAL AORTA: ICD-10-CM

## 2020-02-03 DIAGNOSIS — F41.8 SITUATIONAL ANXIETY: ICD-10-CM

## 2020-02-03 DIAGNOSIS — I10 BENIGN ESSENTIAL HTN: ICD-10-CM

## 2020-02-03 DIAGNOSIS — K21.9 GASTROESOPHAGEAL REFLUX DISEASE WITHOUT ESOPHAGITIS: ICD-10-CM

## 2020-02-03 DIAGNOSIS — E04.1 NODULAR THYROID DISEASE: ICD-10-CM

## 2020-02-03 DIAGNOSIS — E03.9 ACQUIRED HYPOTHYROIDISM: ICD-10-CM

## 2020-02-03 DIAGNOSIS — E55.9 VITAMIN D DEFICIENCY: Chronic | ICD-10-CM

## 2020-02-03 DIAGNOSIS — E03.9 ACQUIRED HYPOTHYROIDISM: Primary | ICD-10-CM

## 2020-02-03 DIAGNOSIS — M85.89 OSTEOPENIA OF MULTIPLE SITES: ICD-10-CM

## 2020-02-03 DIAGNOSIS — K20.0 EOSINOPHILIC ESOPHAGITIS: ICD-10-CM

## 2020-02-03 DIAGNOSIS — F32.9 REACTIVE DEPRESSION: ICD-10-CM

## 2020-02-03 DIAGNOSIS — E78.5 HYPERLIPIDEMIA, UNSPECIFIED HYPERLIPIDEMIA TYPE: ICD-10-CM

## 2020-02-03 DIAGNOSIS — Z78.0 MENOPAUSE: ICD-10-CM

## 2020-02-03 LAB
BASOPHILS # BLD AUTO: 0.09 K/UL (ref 0–0.2)
BASOPHILS NFR BLD: 0.6 % (ref 0–1.9)
DIFFERENTIAL METHOD: ABNORMAL
EOSINOPHIL # BLD AUTO: 0.2 K/UL (ref 0–0.5)
EOSINOPHIL NFR BLD: 1.4 % (ref 0–8)
ERYTHROCYTE [DISTWIDTH] IN BLOOD BY AUTOMATED COUNT: 14.3 % (ref 11.5–14.5)
HCT VFR BLD AUTO: 45.6 % (ref 37–48.5)
HGB BLD-MCNC: 13.5 G/DL (ref 12–16)
IMM GRANULOCYTES # BLD AUTO: 0.05 K/UL (ref 0–0.04)
IMM GRANULOCYTES NFR BLD AUTO: 0.4 % (ref 0–0.5)
LYMPHOCYTES # BLD AUTO: 3.2 K/UL (ref 1–4.8)
LYMPHOCYTES NFR BLD: 22.7 % (ref 18–48)
MCH RBC QN AUTO: 27.4 PG (ref 27–31)
MCHC RBC AUTO-ENTMCNC: 29.6 G/DL (ref 32–36)
MCV RBC AUTO: 93 FL (ref 82–98)
MONOCYTES # BLD AUTO: 0.7 K/UL (ref 0.3–1)
MONOCYTES NFR BLD: 5 % (ref 4–15)
NEUTROPHILS # BLD AUTO: 9.7 K/UL (ref 1.8–7.7)
NEUTROPHILS NFR BLD: 69.9 % (ref 38–73)
NRBC BLD-RTO: 0 /100 WBC
PLATELET # BLD AUTO: 383 K/UL (ref 150–350)
PMV BLD AUTO: 9.1 FL (ref 9.2–12.9)
RBC # BLD AUTO: 4.92 M/UL (ref 4–5.4)
WBC # BLD AUTO: 13.93 K/UL (ref 3.9–12.7)

## 2020-02-03 PROCEDURE — 99999 PR PBB SHADOW E&M-EST. PATIENT-LVL IV: CPT | Mod: PBBFAC,,, | Performed by: INTERNAL MEDICINE

## 2020-02-03 PROCEDURE — 80053 COMPREHEN METABOLIC PANEL: CPT

## 2020-02-03 PROCEDURE — 80061 LIPID PANEL: CPT

## 2020-02-03 PROCEDURE — 99214 PR OFFICE/OUTPT VISIT, EST, LEVL IV, 30-39 MIN: ICD-10-PCS | Mod: S$GLB,,, | Performed by: INTERNAL MEDICINE

## 2020-02-03 PROCEDURE — 1125F AMNT PAIN NOTED PAIN PRSNT: CPT | Mod: S$GLB,,, | Performed by: INTERNAL MEDICINE

## 2020-02-03 PROCEDURE — 82330 ASSAY OF CALCIUM: CPT

## 2020-02-03 PROCEDURE — 1159F MED LIST DOCD IN RCRD: CPT | Mod: S$GLB,,, | Performed by: INTERNAL MEDICINE

## 2020-02-03 PROCEDURE — 84443 ASSAY THYROID STIM HORMONE: CPT

## 2020-02-03 PROCEDURE — 84439 ASSAY OF FREE THYROXINE: CPT

## 2020-02-03 PROCEDURE — 3079F DIAST BP 80-89 MM HG: CPT | Mod: CPTII,S$GLB,, | Performed by: INTERNAL MEDICINE

## 2020-02-03 PROCEDURE — 86800 THYROGLOBULIN ANTIBODY: CPT

## 2020-02-03 PROCEDURE — 3079F PR MOST RECENT DIASTOLIC BLOOD PRESSURE 80-89 MM HG: ICD-10-PCS | Mod: CPTII,S$GLB,, | Performed by: INTERNAL MEDICINE

## 2020-02-03 PROCEDURE — 85025 COMPLETE CBC W/AUTO DIFF WBC: CPT

## 2020-02-03 PROCEDURE — 83018 HEAVY METAL QUAN EACH NES: CPT

## 2020-02-03 PROCEDURE — 3077F SYST BP >= 140 MM HG: CPT | Mod: CPTII,S$GLB,, | Performed by: INTERNAL MEDICINE

## 2020-02-03 PROCEDURE — 99214 OFFICE O/P EST MOD 30 MIN: CPT | Mod: S$GLB,,, | Performed by: INTERNAL MEDICINE

## 2020-02-03 PROCEDURE — 82306 VITAMIN D 25 HYDROXY: CPT

## 2020-02-03 PROCEDURE — 1101F PR PT FALLS ASSESS DOC 0-1 FALLS W/OUT INJ PAST YR: ICD-10-PCS | Mod: CPTII,S$GLB,, | Performed by: INTERNAL MEDICINE

## 2020-02-03 PROCEDURE — 83970 ASSAY OF PARATHORMONE: CPT

## 2020-02-03 PROCEDURE — 3077F PR MOST RECENT SYSTOLIC BLOOD PRESSURE >= 140 MM HG: ICD-10-PCS | Mod: CPTII,S$GLB,, | Performed by: INTERNAL MEDICINE

## 2020-02-03 PROCEDURE — 1159F PR MEDICATION LIST DOCUMENTED IN MEDICAL RECORD: ICD-10-PCS | Mod: S$GLB,,, | Performed by: INTERNAL MEDICINE

## 2020-02-03 PROCEDURE — 82308 ASSAY OF CALCITONIN: CPT

## 2020-02-03 PROCEDURE — 84480 ASSAY TRIIODOTHYRONINE (T3): CPT

## 2020-02-03 PROCEDURE — 1125F PR PAIN SEVERITY QUANTIFIED, PAIN PRESENT: ICD-10-PCS | Mod: S$GLB,,, | Performed by: INTERNAL MEDICINE

## 2020-02-03 PROCEDURE — 1101F PT FALLS ASSESS-DOCD LE1/YR: CPT | Mod: CPTII,S$GLB,, | Performed by: INTERNAL MEDICINE

## 2020-02-03 PROCEDURE — 99999 PR PBB SHADOW E&M-EST. PATIENT-LVL IV: ICD-10-PCS | Mod: PBBFAC,,, | Performed by: INTERNAL MEDICINE

## 2020-02-03 PROCEDURE — 84550 ASSAY OF BLOOD/URIC ACID: CPT

## 2020-02-03 PROCEDURE — 86316 IMMUNOASSAY TUMOR OTHER: CPT

## 2020-02-03 NOTE — PROGRESS NOTES
Procedures by Bhavana Del Angel MD at  2016 10:04 AM      Author:  Bhavana Del Angel MD Service:  Neurology Author Type:  Physician     Filed:  2016 10:19 AM Date of Service:  2016 10:04 AM Status:  Signed     :  Bhavana Del Angel MD (Physician)            Continuous Video EEG  Long Term Monitoring    Patient Name:  Chary Donnelly  MRN: [id number]   :  1982 File #: Isaak Ricardo    Age: 26 y o  Encounter #: [id number]   Date performed: -2016 Referring Provider: Robson Prince          Report date: 2016          Study type: Continuous video EEG, up to 24 hours    ICD 10 diagnosis: Spells/Fit NOS R56 9 and Coma R40 2    Start time: 2016 08:01  End time: 2016 00:30    Patient History:  Patient is 26 y o  male on continuous video EEG monitoring for the assessment of seizures, characterization of  events, and effect of treatment  Patient has a history of a seizure disorder or prior seizures, 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 with left epidural hematoma, parenchyma hemorrhages, and subarachoid hemorrhage  Patient is comatose with shivering movements and rigidity  concerning for seizures  Continuous EEG requested to assess for seizures  Current AEDs:  Medications include:     cefazolin 2,000 mg Intravenous Q8H   chlorhexidine 15 mL Swish & Spit Q12H [address]   clotrimazole  Topical BID   levETIRAcetam 750 mg Intravenous Q12H [address]   midazolam 2 mg Intravenous Once   pantoprazole 40 mg Intravenous Early Morning   potassium chloride 40 mEq Intravenous Once   sodium phosphate 12 mmol Intravenous Once       Description of Procedure:   A 24 hours continuous video EEG was performed with electrodes applied using the International 10-20 System at least 16 channels are reviewed and formatted into longitudinal bipolar, transverse  bipolar, and referential (to common reference or calculated common reference) montages    Additional electrodes used included Subjective:      Patient ID: Elham Atreaga is a 74 y.o. female.    Chief Complaint:  Hypothyroidism    Patient is a 74 yr old lady seen in [facility] today on account of hypothyroidism on thyroid hormone repletion therapy.    History of Present Illness    Patient is a  74 y.o. postmenopausal lady seen in [facility] today on account of hypothyroidism.  Here for hypercalcemia/hypothyroidism along with other conditions listed in the Visit Diagnosis. Diagnosed with hypothyroidism in 2014. Noticed her ca was elevated in 5/19 at 10.5 by her PCP. Ca found to be 10.0% a few days later. +FHx of thyroid disease in her mother (goiter) and aunt. + MTHFR mutation (2 copies of the same mutation).  Previous pt of Dr. Nance. She has vb12 deficiency and takes 1000 mcg daily. Complains of nausea, fatigue, muscle aches, numbness and tingling in fingers and feet, memory fog, bentyl. Taking 0.25 mg Qd. Not taking vitamin d. Levothyroxine is [facility], sends prescription to [place] [facility]. No hx of kidney stones. Avoids milk and cheese due to lactose intolerance.  Thyroid u/s 8/18 showed a 4 mm nodule in the isthmus.     DEXA 3/18: osteopenia at [facility]. No falls, fractures or steriod injections. Not taking ca or vd.    Review of Systems   Constitutional: Positive for fatigue (chronic). Negative for chills and diaphoresis.   HENT: Negative for facial swelling, trouble swallowing and voice change.    Eyes: Negative for photophobia and visual disturbance.   Respiratory: Negative for cough and shortness of breath.    Cardiovascular: Negative for chest pain, palpitations and leg swelling.   Gastrointestinal: Negative for abdominal pain, diarrhea and nausea.   Endocrine: Positive for cold intolerance. Negative for heat intolerance, polyphagia and polyuria.   Genitourinary: Negative for dysuria, frequency and menstrual problem (postmenopausal).   Musculoskeletal: Negative for gait problem and joint swelling.   Skin: Negative for color change, pallor and rash.  "  Neurological: Negative for dizziness, syncope and headaches.   Hematological: Does not bruise/bleed easily.   Psychiatric/Behavioral: Negative for confusion. The patient is not nervous/anxious.        Objective: BP (!) 144/88 (BP Location: Right arm, Patient Position: Sitting, BP Method: Small (Manual))   Pulse 90   Temp 97.9 °F (36.6 °C) (Oral)   Ht 5' 2" (1.575 m)   Wt 65.5 kg (144 lb 8.2 oz)   BMI 26.43 kg/m²  Body surface area is 1.69 meters squared.         Physical Exam   Constitutional: She is oriented to person, place, and time. She appears well-developed and well-nourished. No distress.   Pleasant elderly lady. She looks signiifcantly younger than stated chronologic age.  Not pale, anicteric and afebrile. Well hydrated.   HENT:   Head: Normocephalic and atraumatic.   Eyes: Pupils are equal, round, and reactive to light. EOM are normal. No scleral icterus.   Neck: Normal range of motion. Neck supple. No JVD present. No tracheal deviation present. Thyromegaly (symmetric, firm surface; non tender.) present.   Cardiovascular: Normal rate, regular rhythm and normal heart sounds.   No murmur heard.  Pulmonary/Chest: Effort normal and breath sounds normal. No stridor. No respiratory distress. She has no wheezes. She has no rales.   Abdominal: Soft. Bowel sounds are normal. She exhibits no distension.   Musculoskeletal: Normal range of motion. She exhibits no edema or tenderness.   Lymphadenopathy:     She has no cervical adenopathy.   Neurological: She is alert and oriented to person, place, and time. No cranial nerve deficit.   Skin: Skin is warm and dry. No rash noted. She is not diaphoretic. No erythema. No pallor.   Psychiatric: She has a normal mood and affect. Her behavior is normal. Judgment and thought content normal.   Vitals reviewed.      Lab Review:     Results for DORINA BAZAN (MRN [id number]) as of 2/3/2020 16:04   Ref. Range 9/4/2019 10:00 9/4/2019 10:00   CREATININE, URINE (SEND OUT) Latest " T1, T2, and extraocular electrodes, and ECG, along with video recording  The EEG was recorded with the patient  comatose state  A monitoring technologist superivised the continuous recording  The recording was technically satisfactory  The study was abruptly terminated at 9/22 00:30 because the patient needed a CT head study to assess for elevated intracranial pressures  Findings:   Background Activity: The background is asymmetric due to continuous slower activity over the left hemisphere  For most of the study, there is a widespread alpha 10-11 Hz maximal over the frontal regions and lower voltage over the posterior region  When the patient is stimulated or if there is someone in the room, the background changes to left greater than right polymorphic moderate-high voltage 0 5-1 Hz delta activity and attention in voltage of the anterior alpha rhythms  The right hemisphere  is obscured by EMG artifact  Abnormal findings: There is continuous polymorphic 0 25-0 5 delta activity over the left posterior quadrant  Other findings: The single lead ECG is obscured by movement artifact but at times shows a regular QRS complex  Events: There are no patient push button events  Interpretation: This greater than 20 5 hours continuous video EEG recording shows the presence of widespread alpha rhythm  that can be seen as an effect of general anesthesia, such as propofol and midazolam   However, during periods of stimulation there is left greater than right diffuse delta activity and continuous focal delta activity over the left posterior quadrant  These findings indicate moderate-severe diffuse cerebral dysfunction, left greater than right, with a superimposed left posterior quadrant structural lesion    Due to the EMG artifact over the right derivations, it is difficult to say if the left greater than right delta activity is more of global pathology or left hemispheric pathology, clinical and Ref Range: 15.0 - 325.0 mg/dL  43.0   Calcium, Urine Latest Ref Range: 0.0 - 15.0 mg/dL 5.3    Calcium, 24H Urine Latest Ref Range: 4 - 12 mg/Hr 4    CA Urine (mg/Spec) Latest Units: mg/Spec 93    Creatinine, Ur (mg/spec) Latest Units: mg/Spec  752.5   Creatinine, Timed Urine Latest Ref Range: 40.0 - 75.0 mg/Hr  31.4 (L)     Results for DORINA BAZAN (MRN [id number]) as of 2/3/2020 16:04   Ref. Range 5/23/2019 21:10 8/23/2019 11:42   Sodium Latest Ref Range: 136 - 145 mmol/L 141 139   Potassium Latest Ref Range: 3.5 - 5.1 mmol/L 4.2 4.3   Chloride Latest Ref Range: 95 - 110 mmol/L 105 104   CO2 Latest Ref Range: 23 - 29 mmol/L 27 28   Anion Gap Latest Ref Range: 8 - 16 mmol/L 9 7 (L)   BUN, Bld Latest Ref Range: 8 - 23 mg/dL 20 (H) 20   Creatinine Latest Ref Range: 0.5 - 1.4 mg/dL 0.67 0.8   eGFR if non African American Latest Ref Range: >60 mL/min/1.73 m^2 >60 >60.0   eGFR if African American Latest Ref Range: >60 mL/min/1.73 m^2 >60 >60.0   Glucose Latest Ref Range: 70 - 110 mg/dL 95 104   Calcium Latest Ref Range: 8.7 - 10.5 mg/dL 10.5 (H) 10.3   Calcium, Ion Latest Ref Range: 1.06 - 1.42 mmol/L  1.35   Phosphorus Latest Ref Range: 2.7 - 4.5 mg/dL  3.5   Magnesium Latest Ref Range: 1.6 - 2.6 mg/dL  2.1   Alkaline Phosphatase Latest Ref Range: 55 - 135 U/L 122 131   PROTEIN TOTAL Latest Ref Range: 6.0 - 8.4 g/dL 7.8 7.2   Albumin Latest Ref Range: 3.5 - 5.2 g/dL 4.8 3.9   BILIRUBIN TOTAL Latest Ref Range: 0.1 - 1.0 mg/dL 0.7 0.5   AST Latest Ref Range: 10 - 40 U/L 34 15   ALT Latest Ref Range: 10 - 44 U/L 22 17   Triglycerides Latest Ref Range: 30 - 150 mg/dL  259 (H)   Cholesterol Latest Ref Range: 120 - 199 mg/dL  235 (H)   HDL Latest Ref Range: 40 - 75 mg/dL  54   Hdl/Cholesterol Ratio Latest Ref Range: 20.0 - 50.0 %  23.0   LDL Cholesterol External Latest Ref Range: 63.0 - 159.0 mg/dL  129.2   Non-HDL Cholesterol Latest Units: mg/dL  181   Total Cholesterol/HDL Ratio Latest Ref Range: 2.0 - 5.0   4.4   TSH  radiographic correlation is recommended  No electrographic seizure is present  Cipriano Sher MD  [facility]  Hannah RECINOS    Sep 23 2016 10:14AM [facility] Standard Time Latest Ref Range: 0.400 - 4.000 uIU/mL  1.548   T3, Total Latest Ref Range: 60 - 180 ng/dL  94   Free T4 Latest Ref Range: 0.71 - 1.51 ng/dL  0.95   Thyroglobulin Interpretation Unknown  SEE BELOW   Thyroglobulin Antibody Screen Latest Ref Range: <4.0 IU/mL  <1.8   Thyroglobulin, Tumor Marker Latest Units: ng/mL  3.0 (H)   Thyroperoxidase Antibodies Latest Ref Range: <6.0 IU/mL  <6.0   PTH Latest Ref Range: 9.0 - 77.0 pg/mL  84.0 (H)     Results for DORINA BAZAN (MRN [id number]) as of 2/3/2020 16:04   Ref. Range 8/23/2019 11:42   WBC Latest Ref Range: 3.90 - 12.70 K/uL 9.21   RBC Latest Ref Range: 4.00 - 5.40 M/uL 4.93   Hemoglobin Latest Ref Range: 12.0 - 16.0 g/dL 13.6   Hematocrit Latest Ref Range: 37.0 - 48.5 % 44.9   MCV Latest Ref Range: 82 - 98 fL 91   MCH Latest Ref Range: 27.0 - 31.0 pg 27.6   MCHC Latest Ref Range: 32.0 - 36.0 g/dL 30.3 (L)   RDW Latest Ref Range: 11.5 - 14.5 % 13.7   Platelets Latest Ref Range: 150 - 350 K/uL 298   MPV Latest Ref Range: 9.2 - 12.9 fL 9.2   Gran% Latest Ref Range: 38.0 - 73.0 % 65.6   Gran # (ANC) Latest Ref Range: 1.8 - 7.7 K/uL 6.0   Lymph% Latest Ref Range: 18.0 - 48.0 % 24.3   Lymph # Latest Ref Range: 1.0 - 4.8 K/uL 2.2   Mono% Latest Ref Range: 4.0 - 15.0 % 6.4   Mono # Latest Ref Range: 0.3 - 1.0 K/uL 0.6   Eosinophil% Latest Ref Range: 0.0 - 8.0 % 2.9   Eos # Latest Ref Range: 0.0 - 0.5 K/uL 0.3   Basophil% Latest Ref Range: 0.0 - 1.9 % 0.5   Baso # Latest Ref Range: 0.00 - 0.20 K/uL 0.05   nRBC Latest Ref Range: 0 /100 WBC 0   Differential Method Unknown Automated   Immature Grans (Abs) Latest Ref Range: 0.00 - 0.04 K/uL 0.03   Immature Granulocytes Latest Ref Range: 0.0 - 0.5 % 0.3       Assessment:     1. Acquired hypothyroidism  T4, free    T3    Thyroglobulin    TSH    CBC auto differential    Uric acid   2. Osteopenia of multiple sites  Comprehensive metabolic panel   3. Eosinophilic esophagitis     4. Gastroesophageal reflux disease without esophagitis   Comprehensive metabolic panel   5. Vitamin D deficiency  Calcium, ionized    Vitamin D    PTH, intact   6. Hypercalcemia  PTH, intact   7. Menopause     8. Hyperlipidemia, unspecified hyperlipidemia type  Lipid panel    Comprehensive metabolic panel   9. Benign essential HTN     10. Atherosclerosis of abdominal aorta  Lipid panel   11. Reactive depression     12. Situational anxiety     13. Nodular thyroid disease  Iodine, Serum    Calcitonin    Chromogranin A        Hypercalcemia-recheck pth  And calcium today to see if this persists or has cleared  Hypothyroidism-TFTs today. Continue current LT4 dose but may adjust dose based on current TFTs results.  Regarding thyroid nodular disease; for ffup thyroid USS; ~ 08/20.  Regarding hyperlipidemia + hyperCholesterolemia- to recheck lipid panel today.   Hypovitaminosis d-To continue high dose vitamin D repletion  Regarding osteopenia; for ffup DEXA ~ 03/20     Plan:       FFup  In ~ 6mths.

## 2020-02-04 DIAGNOSIS — E78.00 HYPERCHOLESTEROLEMIA: ICD-10-CM

## 2020-02-04 DIAGNOSIS — I70.0 ATHEROSCLEROSIS OF ABDOMINAL AORTA: ICD-10-CM

## 2020-02-04 DIAGNOSIS — E78.5 HYPERLIPIDEMIA, UNSPECIFIED HYPERLIPIDEMIA TYPE: Primary | ICD-10-CM

## 2020-02-04 LAB
25(OH)D3+25(OH)D2 SERPL-MCNC: 46 NG/ML (ref 30–96)
ALBUMIN SERPL BCP-MCNC: 3.9 G/DL (ref 3.5–5.2)
ALP SERPL-CCNC: 140 U/L (ref 55–135)
ALT SERPL W/O P-5'-P-CCNC: 21 U/L (ref 10–44)
ANION GAP SERPL CALC-SCNC: 10 MMOL/L (ref 8–16)
AST SERPL-CCNC: 18 U/L (ref 10–40)
BILIRUB SERPL-MCNC: 0.5 MG/DL (ref 0.1–1)
BUN SERPL-MCNC: 26 MG/DL (ref 8–23)
CA-I BLDV-SCNC: 1.34 MMOL/L (ref 1.06–1.42)
CALCIUM SERPL-MCNC: 10.1 MG/DL (ref 8.7–10.5)
CHLORIDE SERPL-SCNC: 103 MMOL/L (ref 95–110)
CHOLEST SERPL-MCNC: 251 MG/DL (ref 120–199)
CHOLEST/HDLC SERPL: 3.9 {RATIO} (ref 2–5)
CO2 SERPL-SCNC: 24 MMOL/L (ref 23–29)
CREAT SERPL-MCNC: 0.8 MG/DL (ref 0.5–1.4)
EST. GFR  (AFRICAN AMERICAN): >60 ML/MIN/1.73 M^2
EST. GFR  (NON AFRICAN AMERICAN): >60 ML/MIN/1.73 M^2
GLUCOSE SERPL-MCNC: 88 MG/DL (ref 70–110)
HDLC SERPL-MCNC: 65 MG/DL (ref 40–75)
HDLC SERPL: 25.9 % (ref 20–50)
LDLC SERPL CALC-MCNC: 152.2 MG/DL (ref 63–159)
NONHDLC SERPL-MCNC: 186 MG/DL
POTASSIUM SERPL-SCNC: 4.4 MMOL/L (ref 3.5–5.1)
PROT SERPL-MCNC: 7.7 G/DL (ref 6–8.4)
PTH-INTACT SERPL-MCNC: 108 PG/ML (ref 9–77)
SODIUM SERPL-SCNC: 137 MMOL/L (ref 136–145)
T3 SERPL-MCNC: 75 NG/DL (ref 60–180)
T4 FREE SERPL-MCNC: 0.95 NG/DL (ref 0.71–1.51)
TRIGL SERPL-MCNC: 169 MG/DL (ref 30–150)
TSH SERPL DL<=0.005 MIU/L-ACNC: 1.42 UIU/ML (ref 0.4–4)
URATE SERPL-MCNC: 5.2 MG/DL (ref 2.4–5.7)

## 2020-02-04 RX ORDER — EZETIMIBE 10 MG/1
10 TABLET ORAL DAILY
Qty: 90 TABLET | Refills: 3 | Status: SHIPPED | OUTPATIENT
Start: 2020-02-04 | End: 2020-12-02

## 2020-02-05 LAB
THRYOGLOBULIN INTERPRETATION: ABNORMAL
THYROGLOB AB SERPL-ACNC: <1.8 IU/ML
THYROGLOB SERPL-MCNC: 1.9 NG/ML

## 2020-02-06 LAB
CALCIT SERPL-MCNC: <5 PG/ML
IODINE SERPL-MCNC: 61 NG/ML (ref 40–92)

## 2020-02-07 PROBLEM — E04.1 NODULAR THYROID DISEASE: Status: ACTIVE | Noted: 2020-02-07

## 2020-02-07 LAB — CGA SERPL-MCNC: 282 NG/ML (ref 0–160)

## 2020-03-02 RX ORDER — LEVOTHYROXINE SODIUM 50 UG/1
50 TABLET ORAL DAILY
Qty: 90 TABLET | Refills: 3 | Status: SHIPPED | OUTPATIENT
Start: 2020-03-02 | End: 2020-05-31

## 2020-03-02 NOTE — TELEPHONE ENCOUNTER
----- Message from Cassandra Dyer sent at 3/2/2020  8:04 AM CST -----  Contact: patient  Type:  RX Refill Request  Who Called:  patient  Refill or New Rx:  Refill  RX Name and Strength:  levothyroxine (SYNTHROID) 50 MCG tablet  How is the patient currently taking it? (ex. 1XDay):  1 x day   Is this a 30 day or 90 day RX:  90  Preferred Pharmacy with phone number:    [id number] - [place] [address] [place] [address] & [facility] [address] [place] [address] [facility] [address] [place] [id number]  Phone: [phone] Fax: [phone]  Local or Mail Order:  Local  Ordering Provider:  ?  Best Call Back Number:  [phone]  Additional Information:  Patient would like to get the generic brand.  Please call to advise.  Thanks!

## 2020-03-10 ENCOUNTER — HOSPITAL ENCOUNTER (OUTPATIENT)
Dept: RADIOLOGY | Facility: HOSPITAL | Age: 75
Discharge: HOME | End: 2020-03-10
Attending: INTERNAL MEDICINE
Payer: MEDICARE

## 2020-03-10 DIAGNOSIS — Z78.0 POSTMENOPAUSAL: ICD-10-CM

## 2020-03-10 DIAGNOSIS — E83.52 HYPERCALCEMIA: ICD-10-CM

## 2020-03-10 DIAGNOSIS — E21.3 HYPERPARATHYROIDISM: ICD-10-CM

## 2020-03-10 DIAGNOSIS — M85.89 OSTEOPENIA OF MULTIPLE SITES: ICD-10-CM

## 2020-03-10 DIAGNOSIS — K21.9 GASTROESOPHAGEAL REFLUX DISEASE WITHOUT ESOPHAGITIS: ICD-10-CM

## 2020-03-10 DIAGNOSIS — M85.80 OSTEOPENIA WITH HIGH RISK OF FRACTURE: Primary | ICD-10-CM

## 2020-03-10 DIAGNOSIS — E55.9 VITAMIN D DEFICIENCY: ICD-10-CM

## 2020-03-10 PROBLEM — E06.3 HASHIMOTO'S DISEASE: Status: ACTIVE | Noted: 2020-03-10

## 2020-03-10 PROBLEM — Z15.89 MTHFR GENE MUTATION: Status: ACTIVE | Noted: 2020-03-10

## 2020-03-10 PROCEDURE — 77080 DEXA BONE DENSITY SPINE HIP: ICD-10-PCS | Mod: 26,,, | Performed by: RADIOLOGY

## 2020-03-10 PROCEDURE — 77080 DXA BONE DENSITY AXIAL: CPT | Mod: TC,PO

## 2020-03-10 PROCEDURE — 77080 DXA BONE DENSITY AXIAL: CPT | Mod: 26,,, | Performed by: RADIOLOGY

## 2020-03-10 RX ORDER — RISEDRONATE SODIUM 35 MG/1
35 TABLET, FILM COATED ORAL
Qty: 12 TABLET | Refills: 3 | Status: SHIPPED | OUTPATIENT
Start: 2020-03-10 | End: 2020-08-12

## 2020-03-12 PROBLEM — D72.829 LEUKOCYTOSIS: Status: ACTIVE | Noted: 2020-03-12

## 2020-06-08 PROBLEM — S83.271A COMPLEX TEAR OF LATERAL MENISCUS OF RIGHT KNEE AS CURRENT INJURY: Status: RESOLVED | Noted: 2017-03-28 | Resolved: 2020-06-08

## 2020-06-15 ENCOUNTER — INITIAL CONSULT (OUTPATIENT)
Dept: RHEUMATOLOGY | Facility: CLINIC | Age: 75
End: 2020-06-15
Payer: MEDICARE

## 2020-06-15 VITALS
BODY MASS INDEX: 27.8 KG/M2 | DIASTOLIC BLOOD PRESSURE: 82 MMHG | HEART RATE: 84 BPM | SYSTOLIC BLOOD PRESSURE: 136 MMHG | WEIGHT: 152 LBS

## 2020-06-15 DIAGNOSIS — R76.8 ANA POSITIVE: Primary | ICD-10-CM

## 2020-06-15 DIAGNOSIS — L13.0 DERMATITIS HERPETIFORMIS: ICD-10-CM

## 2020-06-15 DIAGNOSIS — M15.9 PRIMARY OSTEOARTHRITIS INVOLVING MULTIPLE JOINTS: ICD-10-CM

## 2020-06-15 DIAGNOSIS — K20.0 EOSINOPHILIC ESOPHAGITIS: ICD-10-CM

## 2020-06-15 DIAGNOSIS — M25.50 ARTHRALGIA, UNSPECIFIED JOINT: ICD-10-CM

## 2020-06-15 DIAGNOSIS — E06.3 HASHIMOTO'S THYROIDITIS: ICD-10-CM

## 2020-06-15 PROCEDURE — 3079F DIAST BP 80-89 MM HG: CPT | Mod: CPTII,S$GLB,, | Performed by: INTERNAL MEDICINE

## 2020-06-15 PROCEDURE — 1125F PR PAIN SEVERITY QUANTIFIED, PAIN PRESENT: ICD-10-PCS | Mod: S$GLB,,, | Performed by: INTERNAL MEDICINE

## 2020-06-15 PROCEDURE — 99205 OFFICE O/P NEW HI 60 MIN: CPT | Mod: S$GLB,,, | Performed by: INTERNAL MEDICINE

## 2020-06-15 PROCEDURE — 1159F MED LIST DOCD IN RCRD: CPT | Mod: S$GLB,,, | Performed by: INTERNAL MEDICINE

## 2020-06-15 PROCEDURE — 99999 PR PBB SHADOW E&M-EST. PATIENT-LVL III: ICD-10-PCS | Mod: PBBFAC,,, | Performed by: INTERNAL MEDICINE

## 2020-06-15 PROCEDURE — 99205 PR OFFICE/OUTPT VISIT, NEW, LEVL V, 60-74 MIN: ICD-10-PCS | Mod: S$GLB,,, | Performed by: INTERNAL MEDICINE

## 2020-06-15 PROCEDURE — 1125F AMNT PAIN NOTED PAIN PRSNT: CPT | Mod: S$GLB,,, | Performed by: INTERNAL MEDICINE

## 2020-06-15 PROCEDURE — 3075F PR MOST RECENT SYSTOLIC BLOOD PRESS GE 130-139MM HG: ICD-10-PCS | Mod: CPTII,S$GLB,, | Performed by: INTERNAL MEDICINE

## 2020-06-15 PROCEDURE — 1101F PT FALLS ASSESS-DOCD LE1/YR: CPT | Mod: CPTII,S$GLB,, | Performed by: INTERNAL MEDICINE

## 2020-06-15 PROCEDURE — 3075F SYST BP GE 130 - 139MM HG: CPT | Mod: CPTII,S$GLB,, | Performed by: INTERNAL MEDICINE

## 2020-06-15 PROCEDURE — 99999 PR PBB SHADOW E&M-EST. PATIENT-LVL III: CPT | Mod: PBBFAC,,, | Performed by: INTERNAL MEDICINE

## 2020-06-15 PROCEDURE — 3079F PR MOST RECENT DIASTOLIC BLOOD PRESSURE 80-89 MM HG: ICD-10-PCS | Mod: CPTII,S$GLB,, | Performed by: INTERNAL MEDICINE

## 2020-06-15 PROCEDURE — 1101F PR PT FALLS ASSESS DOC 0-1 FALLS W/OUT INJ PAST YR: ICD-10-PCS | Mod: CPTII,S$GLB,, | Performed by: INTERNAL MEDICINE

## 2020-06-15 PROCEDURE — 1159F PR MEDICATION LIST DOCUMENTED IN MEDICAL RECORD: ICD-10-PCS | Mod: S$GLB,,, | Performed by: INTERNAL MEDICINE

## 2020-06-15 RX ORDER — TRIAMCINOLONE ACETONIDE 0.25 MG/G
OINTMENT TOPICAL 2 TIMES DAILY
Qty: 80 G | Refills: 1 | Status: SHIPPED | OUTPATIENT
Start: 2020-06-15 | End: 2020-12-09

## 2020-06-15 RX ORDER — HYDROXYCHLOROQUINE SULFATE 200 MG/1
200 TABLET, FILM COATED ORAL 2 TIMES DAILY
Qty: 60 TABLET | Refills: 6 | Status: SHIPPED | OUTPATIENT
Start: 2020-06-15 | End: 2020-07-15

## 2020-06-15 ASSESSMENT — ROUTINE ASSESSMENT OF PATIENT INDEX DATA (RAPID3)
FATIGUE SCORE: 4
PATIENT GLOBAL ASSESSMENT SCORE: 6.5
MDHAQ FUNCTION SCORE: 0.7
PSYCHOLOGICAL DISTRESS SCORE: 3.3
PAIN SCORE: 4
TOTAL RAPID3 SCORE: 4.28

## 2020-06-15 NOTE — PROGRESS NOTES
Subjective:       Patient ID: Elham Arteaga is a 74 y.o. female.    Chief Complaint: Disease Management, Pain, and Swelling    Hpi: pt is a 74  Pt has hyperparathyroidism, hashimoto's thyroiditis and pos connie 1:320 homogenous and joint pain. She started having a rash on her elbows x 1 year. She was dx  With a MTHFR defeincy dx in 2006. Patient complains of arthralgias and myalgias for which has been present for a few years. Pain is located in multiple joints, both shoulder(s), both elbow(s), both wrist(s), both MCP(s): 1st, 2nd, 3rd, 4th and 5th, both PIP(s): 1st, 2nd, 3rd, 4th and 5th, both DIP(s): 1st and 2nd, both hip(s), both knee(s) and both MTP(s): 1st, 2nd, 3rd, 4th and 5th, is described as aching, pulsating, shooting and throbbing, and is constant, moderate .  Associated symptoms include: crepitation, decreased range of motion, edema, effusion, tenderness and warmth.  Mother had dx sle and hashimoto's, daughter, she  Gi issues    Review of Systems   Constitutional: Positive for chills and fatigue. Negative for activity change, appetite change, diaphoresis, fever and unexpected weight change.   HENT: Negative for congestion, dental problem, ear discharge, ear pain, facial swelling, mouth sores, nosebleeds, postnasal drip, rhinorrhea, sinus pressure, sneezing, sore throat, tinnitus, trouble swallowing and voice change.    Eyes: Negative for photophobia, pain, discharge, redness and itching.   Respiratory: Negative for apnea, cough, chest tightness, shortness of breath and wheezing.    Cardiovascular: Positive for leg swelling. Negative for chest pain and palpitations.   Gastrointestinal: Positive for abdominal pain. Negative for abdominal distention, constipation, diarrhea, nausea and vomiting.   Endocrine: Negative for cold intolerance, heat intolerance, polydipsia and polyuria.   Genitourinary: Negative for decreased urine volume, difficulty urinating, dysuria, flank pain, frequency, hematuria and urgency.    Musculoskeletal: Positive for arthralgias, back pain, joint swelling, myalgias, neck pain and neck stiffness. Negative for gait problem.   Skin: Negative for pallor, rash and wound.   Allergic/Immunologic: Negative for immunocompromised state.   Neurological: Positive for weakness. Negative for dizziness, tremors, numbness and headaches.   Hematological: Negative for adenopathy. Does not bruise/bleed easily.   Psychiatric/Behavioral: Negative for sleep disturbance. The patient is not nervous/anxious.          Objective:   /82 (BP Location: Left arm, Patient Position: Sitting, BP Method: Medium (Automatic))   Pulse 84   Wt 68.9 kg (152 lb)   BMI 27.80 kg/m²      Physical Exam   Vitals reviewed.  Constitutional: She is oriented to person, place, and time and well-developed, well-nourished, and in no distress.   HENT:   Head: Normocephalic and atraumatic.   Mouth/Throat: Oropharynx is clear and moist.   Eyes: EOM are normal. Pupils are equal, round, and reactive to light.   Neck: Neck supple. No thyromegaly present.   Cardiovascular: Normal rate, regular rhythm and normal heart sounds.  Exam reveals no gallop and no friction rub.    No murmur heard.  Pulmonary/Chest: Breath sounds normal. She has no wheezes. She has no rales. She exhibits no tenderness.   Abdominal: There is no abdominal tenderness. There is no rebound and no guarding.       Right Side Rheumatological Exam     Examination finds the elbow normal.    The patient is tender to palpation of the shoulder, wrist, knee, 1st PIP, 1st MCP, 2nd PIP, 2nd MCP, 3rd PIP, 3rd MCP, 4th PIP, 4th MCP, 5th PIP and 5th MCP    Shoulder Exam   Tenderness Location: acromion    Range of Motion   Active abduction: abnormal   Adduction: abnormal  Sensation: normal    Knee Exam   Tenderness Location: medial joint line and LCL  Patellofemoral Crepitus: positive  Effusion: positive  Sensation: normal    Hip Exam   Tenderness Location: posterior and lateral  Sensation:  normal    Elbow/Wrist Exam   Tenderness Location: no tenderness  Sensation: normal    Left Side Rheumatological Exam     The patient is tender to palpation of the shoulder, elbow, wrist, knee, 1st PIP, 1st MCP, 2nd PIP, 2nd MCP, 3rd PIP, 3rd MCP, 4th PIP, 4th MCP, 5th PIP and 5th MCP.    Shoulder Exam   Tenderness Location: acromion    Range of Motion   Active abduction: abnormal   Sensation: normal    Knee Exam   Tenderness Location: lateral joint line and medial joint line    Patellofemoral Crepitus: positive  Effusion: positive  Sensation: normal    Hip Exam   Tenderness Location: posterior and lateral  Sensation: normal    Elbow/Wrist Exam   Sensation: normal      Back/Neck Exam   Tenderness Right paramedian tenderness of the Upper C-Spine, Upper T-Spine, Upper L-Spine and SI Joint.Left paramedian tenderness of the Upper C-Spine, Upper T-Spine, SI Joint and Upper L-Spine.      Lymphadenopathy:     She has no cervical adenopathy.   Neurological: She is alert and oriented to person, place, and time. Gait normal.   Skin: Rash noted. There is erythema. No pallor.     Psychiatric: Mood, memory, affect and judgment normal.   Musculoskeletal: Tenderness and deformity present.          Results for orders placed or performed in visit on 06/15/20   COVID-19 (SARS-CoV-2) Total Antibodies   Result Value Ref Range    COVID-19 (SARS-CoV-2) Total Antibodies Non-reactive Non-reactive            Assessment:       1. MATEUSZ positive    2. Hashimoto's thyroiditis    3. Primary osteoarthritis involving multiple joints    4. Arthralgia, unspecified joint    5. Eosinophilic esophagitis    6. Dermatitis herpetiformis            Plan:       Elham was seen today for disease management, pain and swelling.    Diagnoses and all orders for this visit:    MATEUSZ positive  -     MATEUSZ Screen w/Reflex; Future  -     Anti Sm/RNP Antibody; Future  -     Anti-DNA antibody, double-stranded; Future  -     Anti-Histone Antibody; Future  -      Anti-scleroderma antibody; Future  -     Anti-Smith antibody; Future  -     Sjogrens syndrome-A extractable nuclear antibody; Future  -     Sjogrens syndrome-B extractable nuclear antibody; Future  -     Sedimentation rate; Future  -     C-Reactive Protein; Future  -     IgA; Future  -     IgE; Future  -     IgG; Future  -     IgG 1, 2, 3, and 4; Future  -     IgM; Future  -     HLA CELIAC CL2; Future  -     ANTI-NEUTROPHILIC CYTOPLASMIC ANTIBODY; Future  -     triamcinolone acetonide 0.025% (KENALOG) 0.025 % Oint; Apply topically 2 (two) times daily.  -     hydroxychloroquine (PLAQUENIL) 200 mg tablet; Take 1 tablet (200 mg total) by mouth 2 (two) times daily. for 60 doses    Hashimoto's thyroiditis  -     MATEUSZ Screen w/Reflex; Future  -     Anti Sm/RNP Antibody; Future  -     Anti-DNA antibody, double-stranded; Future  -     Anti-Histone Antibody; Future  -     Anti-scleroderma antibody; Future  -     Anti-Smith antibody; Future  -     Sjogrens syndrome-A extractable nuclear antibody; Future  -     Sjogrens syndrome-B extractable nuclear antibody; Future  -     Sedimentation rate; Future  -     C-Reactive Protein; Future  -     IgA; Future  -     IgE; Future  -     IgG; Future  -     IgG 1, 2, 3, and 4; Future  -     IgM; Future  -     HLA CELIAC CL2; Future  -     ANTI-NEUTROPHILIC CYTOPLASMIC ANTIBODY; Future  -     triamcinolone acetonide 0.025% (KENALOG) 0.025 % Oint; Apply topically 2 (two) times daily.  -     hydroxychloroquine (PLAQUENIL) 200 mg tablet; Take 1 tablet (200 mg total) by mouth 2 (two) times daily. for 60 doses    Primary osteoarthritis involving multiple joints  -     MATEUSZ Screen w/Reflex; Future  -     Anti Sm/RNP Antibody; Future  -     Anti-DNA antibody, double-stranded; Future  -     Anti-Histone Antibody; Future  -     Anti-scleroderma antibody; Future  -     Anti-Smith antibody; Future  -     Sjogrens syndrome-A extractable nuclear antibody; Future  -     Sjogrens syndrome-B extractable  nuclear antibody; Future  -     Sedimentation rate; Future  -     C-Reactive Protein; Future  -     IgA; Future  -     IgE; Future  -     IgG; Future  -     IgG 1, 2, 3, and 4; Future  -     IgM; Future  -     HLA CELIAC CL2; Future  -     ANTI-NEUTROPHILIC CYTOPLASMIC ANTIBODY; Future  -     triamcinolone acetonide 0.025% (KENALOG) 0.025 % Oint; Apply topically 2 (two) times daily.  -     hydroxychloroquine (PLAQUENIL) 200 mg tablet; Take 1 tablet (200 mg total) by mouth 2 (two) times daily. for 60 doses    Arthralgia, unspecified joint  -     MATEUSZ Screen w/Reflex; Future  -     Anti Sm/RNP Antibody; Future  -     Anti-DNA antibody, double-stranded; Future  -     Anti-Histone Antibody; Future  -     Anti-scleroderma antibody; Future  -     Anti-Smith antibody; Future  -     Sjogrens syndrome-A extractable nuclear antibody; Future  -     Sjogrens syndrome-B extractable nuclear antibody; Future  -     Sedimentation rate; Future  -     C-Reactive Protein; Future  -     IgA; Future  -     IgE; Future  -     IgG; Future  -     IgG 1, 2, 3, and 4; Future  -     IgM; Future  -     HLA CELIAC CL2; Future  -     ANTI-NEUTROPHILIC CYTOPLASMIC ANTIBODY; Future  -     triamcinolone acetonide 0.025% (KENALOG) 0.025 % Oint; Apply topically 2 (two) times daily.  -     hydroxychloroquine (PLAQUENIL) 200 mg tablet; Take 1 tablet (200 mg total) by mouth 2 (two) times daily. for 60 doses    Eosinophilic esophagitis  -     MATEUSZ Screen w/Reflex; Future  -     Anti Sm/RNP Antibody; Future  -     Anti-DNA antibody, double-stranded; Future  -     Anti-Histone Antibody; Future  -     Anti-scleroderma antibody; Future  -     Anti-Smith antibody; Future  -     Sjogrens syndrome-A extractable nuclear antibody; Future  -     Sjogrens syndrome-B extractable nuclear antibody; Future  -     Sedimentation rate; Future  -     C-Reactive Protein; Future  -     IgA; Future  -     IgE; Future  -     IgG; Future  -     IgG 1, 2, 3, and 4; Future  -      IgM; Future  -     HLA CELIAC CL2; Future  -     ANTI-NEUTROPHILIC CYTOPLASMIC ANTIBODY; Future  -     triamcinolone acetonide 0.025% (KENALOG) 0.025 % Oint; Apply topically 2 (two) times daily.  -     hydroxychloroquine (PLAQUENIL) 200 mg tablet; Take 1 tablet (200 mg total) by mouth 2 (two) times daily. for 60 doses    Dermatitis herpetiformis  -     ANTI-NEUTROPHILIC CYTOPLASMIC ANTIBODY; Future  -     triamcinolone acetonide 0.025% (KENALOG) 0.025 % Oint; Apply topically 2 (two) times daily.  -     hydroxychloroquine (PLAQUENIL) 200 mg tablet; Take 1 tablet (200 mg total) by mouth 2 (two) times daily. for 60 doses  -     Ambulatory referral/consult to Dermatology; Future      Consider plaquenil, MCTD, likely celiac sensitivity.    More than 50% of the 60 minute encounter was spent face to face counseling the patient regarding current status and future plan of care as well as side of the medications. All questions were answered to patient's satisfaction

## 2020-06-15 NOTE — LETTER
June 30, 2020      CHET Ellison Jr., MD  [address]  [facility] [address]           [facility]  [address] [place] [address]  Phone: [phone]  Fax: [phone]          Patient: Elham Arteaga   MR Number: [id number]   YOB: 1945   Date of Visit: 6/15/2020       Dear Dr. CHET Ellison Jr.:    Thank you for referring Elham Arteaga to me for evaluation. Attached you will find relevant portions of my assessment and plan of care.    If you have questions, please do not hesitate to call me. I look forward to following Elham Arteaga along with you.    Sincerely,    Reji De La Torre MD    Enclosure  CC:  No Recipients    If you would like to receive this communication electronically, please contact [email or web address] or [phone] to request more information on "[employer] Link access.    For providers and/or their staff who would like to refer a patient to [facility], please contact us through our one-stop-shop provider referral line, [facility], at [phone].    If you feel you have received this communication in error or would no longer like to receive these types of communications, please e-mail [email or web address]

## 2020-06-24 ENCOUNTER — TELEPHONE (OUTPATIENT)
Dept: RHEUMATOLOGY | Facility: CLINIC | Age: 75
End: 2020-06-24

## 2020-06-24 NOTE — TELEPHONE ENCOUNTER
----- Message from Narcisa Alfredo sent at 6/24/2020  9:36 AM CDT -----  Type: Needs Medical Advice  Who Called:  Patient  Best Call Back Number: [phone]  Additional Information: Per patient was given a prescription for hydroxychloroquine (PLAQUENIL) 200 mg tablet and was told to hold off on taking it until lab results came back, states her results are in and would like to know if  she can go ahead and get it filled-please advise-thank you

## 2020-06-24 NOTE — TELEPHONE ENCOUNTER
Returned patient call regarding lab results. Nurse informed will send a message to have labs reviewed and advise if she can start plaquenil. Patient verbalized understanding.

## 2020-08-12 ENCOUNTER — OFFICE VISIT (OUTPATIENT)
Dept: RHEUMATOLOGY | Facility: CLINIC | Age: 75
End: 2020-08-12
Payer: MEDICARE

## 2020-08-12 VITALS
BODY MASS INDEX: 28.24 KG/M2 | WEIGHT: 153.44 LBS | SYSTOLIC BLOOD PRESSURE: 116 MMHG | HEIGHT: 62 IN | HEART RATE: 88 BPM | DIASTOLIC BLOOD PRESSURE: 72 MMHG

## 2020-08-12 DIAGNOSIS — Z15.89 MTHFR GENE MUTATION: ICD-10-CM

## 2020-08-12 DIAGNOSIS — M15.9 PRIMARY OSTEOARTHRITIS INVOLVING MULTIPLE JOINTS: ICD-10-CM

## 2020-08-12 DIAGNOSIS — E06.3 HASHIMOTO'S THYROIDITIS: ICD-10-CM

## 2020-08-12 DIAGNOSIS — K90.41 NON-CELIAC GLUTEN SENSITIVITY: ICD-10-CM

## 2020-08-12 DIAGNOSIS — M35.1 MCTD (MIXED CONNECTIVE TISSUE DISEASE): Primary | ICD-10-CM

## 2020-08-12 DIAGNOSIS — D80.4 IGM DEFICIENCY: ICD-10-CM

## 2020-08-12 PROCEDURE — 99215 OFFICE O/P EST HI 40 MIN: CPT | Mod: 25,S$GLB,, | Performed by: INTERNAL MEDICINE

## 2020-08-12 PROCEDURE — 99215 PR OFFICE/OUTPT VISIT, EST, LEVL V, 40-54 MIN: ICD-10-PCS | Mod: 25,S$GLB,, | Performed by: INTERNAL MEDICINE

## 2020-08-12 PROCEDURE — 96372 PR INJECTION,THERAP/PROPH/DIAG2ST, IM OR SUBCUT: ICD-10-PCS | Mod: 59,S$GLB,, | Performed by: INTERNAL MEDICINE

## 2020-08-12 PROCEDURE — 1159F MED LIST DOCD IN RCRD: CPT | Mod: S$GLB,,, | Performed by: INTERNAL MEDICINE

## 2020-08-12 PROCEDURE — 1101F PR PT FALLS ASSESS DOC 0-1 FALLS W/OUT INJ PAST YR: ICD-10-PCS | Mod: CPTII,S$GLB,, | Performed by: INTERNAL MEDICINE

## 2020-08-12 PROCEDURE — 3078F DIAST BP <80 MM HG: CPT | Mod: CPTII,S$GLB,, | Performed by: INTERNAL MEDICINE

## 2020-08-12 PROCEDURE — 99999 PR PBB SHADOW E&M-EST. PATIENT-LVL III: ICD-10-PCS | Mod: PBBFAC,,, | Performed by: INTERNAL MEDICINE

## 2020-08-12 PROCEDURE — 96372 THER/PROPH/DIAG INJ SC/IM: CPT | Mod: 59,S$GLB,, | Performed by: INTERNAL MEDICINE

## 2020-08-12 PROCEDURE — 1125F PR PAIN SEVERITY QUANTIFIED, PAIN PRESENT: ICD-10-PCS | Mod: S$GLB,,, | Performed by: INTERNAL MEDICINE

## 2020-08-12 PROCEDURE — 3074F SYST BP LT 130 MM HG: CPT | Mod: CPTII,S$GLB,, | Performed by: INTERNAL MEDICINE

## 2020-08-12 PROCEDURE — 99999 PR PBB SHADOW E&M-EST. PATIENT-LVL III: CPT | Mod: PBBFAC,,, | Performed by: INTERNAL MEDICINE

## 2020-08-12 PROCEDURE — 1159F PR MEDICATION LIST DOCUMENTED IN MEDICAL RECORD: ICD-10-PCS | Mod: S$GLB,,, | Performed by: INTERNAL MEDICINE

## 2020-08-12 PROCEDURE — 1101F PT FALLS ASSESS-DOCD LE1/YR: CPT | Mod: CPTII,S$GLB,, | Performed by: INTERNAL MEDICINE

## 2020-08-12 PROCEDURE — 3074F PR MOST RECENT SYSTOLIC BLOOD PRESSURE < 130 MM HG: ICD-10-PCS | Mod: CPTII,S$GLB,, | Performed by: INTERNAL MEDICINE

## 2020-08-12 PROCEDURE — 1125F AMNT PAIN NOTED PAIN PRSNT: CPT | Mod: S$GLB,,, | Performed by: INTERNAL MEDICINE

## 2020-08-12 PROCEDURE — 3078F PR MOST RECENT DIASTOLIC BLOOD PRESSURE < 80 MM HG: ICD-10-PCS | Mod: CPTII,S$GLB,, | Performed by: INTERNAL MEDICINE

## 2020-08-12 RX ORDER — KETOROLAC TROMETHAMINE 30 MG/ML
60 INJECTION, SOLUTION INTRAMUSCULAR; INTRAVENOUS
Status: COMPLETED | OUTPATIENT
Start: 2020-08-12 | End: 2020-08-12

## 2020-08-12 RX ORDER — CYANOCOBALAMIN 1000 UG/ML
1000 INJECTION, SOLUTION INTRAMUSCULAR; SUBCUTANEOUS
Status: COMPLETED | OUTPATIENT
Start: 2020-08-12 | End: 2020-08-12

## 2020-08-12 RX ORDER — DEXTROMETHORPHAN HYDROBROMIDE, GUAIFENESIN 5; 100 MG/5ML; MG/5ML
650 LIQUID ORAL 2 TIMES DAILY PRN
COMMUNITY

## 2020-08-12 RX ORDER — HYDROXYCHLOROQUINE SULFATE 200 MG/1
200 TABLET, FILM COATED ORAL DAILY
COMMUNITY
End: 2020-12-09

## 2020-08-12 RX ADMIN — CYANOCOBALAMIN 1000 MCG: 1000 INJECTION, SOLUTION INTRAMUSCULAR; SUBCUTANEOUS at 11:08

## 2020-08-12 RX ADMIN — KETOROLAC TROMETHAMINE 60 MG: 30 INJECTION, SOLUTION INTRAMUSCULAR; INTRAVENOUS at 11:08

## 2020-08-12 NOTE — PROGRESS NOTES
Subjective:       Patient ID: Elham Arteaga is a 74 y.o. female.    Chief Complaint: Disease Management    Follow up:  74 has hyperparathyroidism, hashimoto's thyroiditis and pos connie 1:320 homogenous and joint pain. Dx with MCTD, and nonceliac gluten hypersensitive.. She was dx  With a MTHFR defeincy dx in 2006. Patient complains of arthralgias and myalgias for which has been present for a few years. Pain is located in multiple joints, both shoulder(s), both elbow(s), both wrist(s), both MCP(s): 1st, 2nd, 3rd, 4th and 5th, both PIP(s): 1st, 2nd, 3rd, 4th and 5th, both DIP(s): 1st and 2nd, both hip(s), both knee(s) and both MTP(s): 1st, 2nd, 3rd, 4th and 5th, is described as aching, pulsating, shooting and throbbing, and is constant, moderate .      Review of Systems   Constitutional: Positive for chills. Negative for activity change, appetite change, diaphoresis and unexpected weight change.   HENT: Negative for congestion, dental problem, ear discharge, ear pain, facial swelling, mouth sores, nosebleeds, postnasal drip, rhinorrhea, sinus pressure, sneezing, sore throat, tinnitus and voice change.    Eyes: Negative for photophobia, pain, discharge, redness and itching.   Respiratory: Negative for apnea, cough, chest tightness, shortness of breath and wheezing.    Cardiovascular: Positive for leg swelling. Negative for chest pain and palpitations.   Gastrointestinal: Positive for abdominal pain. Negative for abdominal distention, constipation, diarrhea, nausea and vomiting.   Endocrine: Negative for cold intolerance, heat intolerance, polydipsia and polyuria.   Genitourinary: Negative for decreased urine volume, difficulty urinating, flank pain, frequency, hematuria and urgency.   Musculoskeletal: Positive for arthralgias, back pain, joint swelling, neck pain and neck stiffness. Negative for gait problem.   Skin: Negative for pallor, rash and wound.   Allergic/Immunologic: Negative for immunocompromised state.  "  Neurological: Positive for weakness. Negative for dizziness, tremors and numbness.   Hematological: Negative for adenopathy. Does not bruise/bleed easily.   Psychiatric/Behavioral: Negative for sleep disturbance. The patient is not nervous/anxious.          Objective:   /72   Pulse 88   Ht 5' 2" (1.575 m)   Wt 69.6 kg (153 lb 7 oz)   BMI 28.06 kg/m²      Physical Exam   Vitals reviewed.  Constitutional: She is oriented to person, place, and time and well-developed, well-nourished, and in no distress.   HENT:   Head: Normocephalic and atraumatic.   Mouth/Throat: Oropharynx is clear and moist.   Eyes: EOM are normal. Pupils are equal, round, and reactive to light.   Neck: Neck supple. No thyromegaly present.   Cardiovascular: Normal rate, regular rhythm and normal heart sounds.  Exam reveals no gallop and no friction rub.    No murmur heard.  Pulmonary/Chest: Breath sounds normal. She has no wheezes. She has no rales. She exhibits no tenderness.   Abdominal: There is no abdominal tenderness. There is no rebound and no guarding.       Right Side Rheumatological Exam     Examination finds the elbow normal.    The patient is tender to palpation of the shoulder, wrist, knee, 1st PIP, 1st MCP, 2nd PIP, 2nd MCP, 3rd PIP, 3rd MCP, 4th PIP, 4th MCP, 5th PIP and 5th MCP    Shoulder Exam   Tenderness Location: acromion    Range of Motion   Active abduction: abnormal   Adduction: abnormal  Sensation: normal    Knee Exam   Tenderness Location: medial joint line and LCL  Patellofemoral Crepitus: positive  Effusion: positive  Sensation: normal    Hip Exam   Tenderness Location: posterior and lateral  Sensation: normal    Elbow/Wrist Exam   Tenderness Location: no tenderness  Sensation: normal    Left Side Rheumatological Exam     The patient is tender to palpation of the shoulder, elbow, wrist, knee, 1st PIP, 1st MCP, 2nd PIP, 2nd MCP, 3rd PIP, 3rd MCP, 4th PIP, 4th MCP, 5th PIP and 5th MCP.    Shoulder Exam "   Tenderness Location: acromion    Range of Motion   Active abduction: abnormal   Sensation: normal    Knee Exam   Tenderness Location: lateral joint line and medial joint line    Patellofemoral Crepitus: positive  Effusion: positive  Sensation: normal    Hip Exam   Tenderness Location: posterior and lateral  Sensation: normal    Elbow/Wrist Exam   Sensation: normal      Back/Neck Exam   Tenderness Right paramedian tenderness of the Upper C-Spine, Upper T-Spine, Upper L-Spine and SI Joint.Left paramedian tenderness of the Upper C-Spine, Upper T-Spine, SI Joint and Upper L-Spine.      Lymphadenopathy:     She has no cervical adenopathy.   Neurological: She is alert and oriented to person, place, and time. Gait normal.   Skin: Rash noted. There is erythema. No pallor.     Psychiatric: Mood, memory, affect and judgment normal.   Musculoskeletal: Tenderness and deformity present.          Results for orders placed or performed in visit on 06/15/20   MATEUSZ Screen w/Reflex   Result Value Ref Range    MATEUSZ Screen Detected (A) None Detected   Anti Sm/RNP Antibody   Result Value Ref Range    Singh (MAGAN) Ab, IgG 1 0 - 40 AU/mL    SM/RNP Antibody 3 0 - 40 AU/mL   Anti-DNA antibody, double-stranded   Result Value Ref Range    ds DNA Ab None Detected None Detected   Anti-Histone Antibody   Result Value Ref Range    Anti-Histone Antibody 0.5 0.0 - 0.9 Units   Anti-scleroderma antibody   Result Value Ref Range    SCL-70 Antibody 2 0 - 40 AU/mL   Anti-Smith antibody   Result Value Ref Range    Singh (MAGAN) Ab, IgG 1 0 - 40 AU/mL   Sjogrens syndrome-A extractable nuclear antibody   Result Value Ref Range    SSA Antibody 4 0 - 40 AU/mL    SSA 60 (Ro) MAGAN Antibody 0 0 - 40 AU/mL   Sjogrens syndrome-B extractable nuclear antibody   Result Value Ref Range    SSB Antibody 0 0 - 40 AU/mL   Sedimentation rate   Result Value Ref Range    Sed Rate 12 0 - 29 mm/Hr   C-Reactive Protein   Result Value Ref Range    CRP 1.20 (H) 0.00 - 0.90 mg/dL    IgA   Result Value Ref Range    IgA 296 40 - 350 mg/dL   IgE   Result Value Ref Range    IgE 36 0 - 100 IU/mL   IgG   Result Value Ref Range    IgG - Serum 912 650 - 1600 mg/dL   IgG 1, 2, 3, and 4   Result Value Ref Range    IgG 1 487 240 - 1118 mg/dL    IgG 2 222 124 - 549 mg/dL    IgG 3 35 21 - 134 mg/dL    IgG 4 17 1 - 123 mg/dL   IgM   Result Value Ref Range    IgM 41 (L) 50 - 300 mg/dL   ANTI-NEUTROPHILIC CYTOPLASMIC ANTIBODY   Result Value Ref Range    Anti-neutrophil Cytoplasmic Antibody, IgG <1:20 <1:20   Celiac Disease HLA Genotyping   Result Value Ref Range    HLA Celiac Specimen Whole Blood     Celiac (HLA-DQA1*05) Positive (A)     Celiac (HLA-DQB1*02) Positive (A)     Celiac (HLA-DQ8) Negative     HLA Celiac Interpretation See Note    MATEUSZ IgG by IFA   Result Value Ref Range    MATEUSZ IgG by IFA Detected (H) <1:80    MATEUSZ Interpretive Comment See Note    Antinuclear Ab Single Pattern   Result Value Ref Range    MATEUSZ Titer 1:320 (A)     MATEUSZ Pattern Homogeneous (A)       reviewed labs with patient during this visit         Assessment:       1. MCTD (mixed connective tissue disease)    2. Hashimoto's thyroiditis    3. Primary osteoarthritis involving multiple joints    4. Non-celiac gluten sensitivity    5. IgM deficiency    6. MTHFR gene mutation            Plan:       Elham was seen today for disease management.    Diagnoses and all orders for this visit:    MCTD (mixed connective tissue disease)  Comments:  on plaquenil 200 mg po qhs  Orders:  -     CBC auto differential; Future  -     Comprehensive metabolic panel; Future  -     Sedimentation rate; Future  -     C-Reactive Protein; Future  -     MATEUSZ Screen w/Reflex; Future  -     IgM; Future  -     Anti-thyroglobulin antibody; Future  -     Thyroid Peroxidase Antibody; Future  -     TSH; Future  -     T4, free; Future  -     T3, free; Future  -     cyanocobalamin injection 1,000 mcg  -     ketorolac injection 60 mg    Hashimoto's thyroiditis  -     CBC  auto differential; Future  -     Comprehensive metabolic panel; Future  -     Sedimentation rate; Future  -     C-Reactive Protein; Future  -     MATEUSZ Screen w/Reflex; Future  -     IgM; Future  -     Anti-thyroglobulin antibody; Future  -     Thyroid Peroxidase Antibody; Future  -     TSH; Future  -     T4, free; Future  -     T3, free; Future  -     cyanocobalamin injection 1,000 mcg  -     ketorolac injection 60 mg    Primary osteoarthritis involving multiple joints  -     CBC auto differential; Future  -     Comprehensive metabolic panel; Future  -     Sedimentation rate; Future  -     C-Reactive Protein; Future  -     MATEUSZ Screen w/Reflex; Future  -     IgM; Future  -     cyanocobalamin injection 1,000 mcg  -     ketorolac injection 60 mg    Non-celiac gluten sensitivity  -     CBC auto differential; Future  -     Comprehensive metabolic panel; Future  -     Sedimentation rate; Future  -     C-Reactive Protein; Future  -     MATEUSZ Screen w/Reflex; Future  -     IgM; Future  -     cyanocobalamin injection 1,000 mcg  -     ketorolac injection 60 mg    IgM deficiency  -     cyanocobalamin injection 1,000 mcg  -     ketorolac injection 60 mg    MTHFR gene mutation  -     cyanocobalamin injection 1,000 mcg  -     ketorolac injection 60 mg      . Continue plaquenil 200 mg po am  will restart  mobic    Continue gluten free living   no live vaccines.    over 50% of this visit was explain labs and possible disease states and course of treatments

## 2020-08-12 NOTE — PROGRESS NOTES
Administered 1 cc ( 1000 mcg/ml ) of b12 to the left upper outer gluteal. Informed of s/s to report verbalized understanding. No adverse reactions noted.      Administered 2 cc ( 30 mg/ml ) of toradol to the left upper outer gluteal. Informed of s/s to report verbalized understanding. No adverse reactions noted.

## 2020-08-17 ENCOUNTER — TELEPHONE (OUTPATIENT)
Dept: RHEUMATOLOGY | Facility: CLINIC | Age: 75
End: 2020-08-17

## 2020-08-17 NOTE — TELEPHONE ENCOUNTER
----- Message from Linda Martin MA sent at 8/17/2020  8:52 AM CDT -----  Type: Needs Medical Advice  Who Called:  Elham Almaraz Call Back Number: [phone]  Additional Information: patient states she is has developed a rash and she feels it is from the medication she was put on.  Please call to discuss

## 2020-08-17 NOTE — TELEPHONE ENCOUNTER
Returned patient call regarding red rash. Patient stated she reported dizzy and ringing in ears and keeping awake. Stated that Dr De La Torre advised to decrease plaquenil down to 1 tablet daily. Stated took mobic and then several hours later took plaquenil. Complained of itchy whelps all over. Stated took claritin this morning and did not take plaquenil. Whelps are gone. Informed Dr De La Torre advised to stop plaquenil and start benadryl every 6 hours as needed for itching and apply hydrocortisone cream to rash. Patient verbalized understanding.

## 2020-11-04 ENCOUNTER — PATIENT MESSAGE (OUTPATIENT)
Dept: RHEUMATOLOGY | Facility: CLINIC | Age: 75
End: 2020-11-04

## 2020-11-04 DIAGNOSIS — E21.3 HYPERPARATHYROIDISM: Primary | ICD-10-CM

## 2020-11-05 ENCOUNTER — PATIENT MESSAGE (OUTPATIENT)
Dept: RHEUMATOLOGY | Facility: CLINIC | Age: 75
End: 2020-11-05

## 2020-12-04 ENCOUNTER — LAB VISIT (OUTPATIENT)
Dept: LAB | Facility: HOSPITAL | Age: 75
End: 2020-12-04
Attending: INTERNAL MEDICINE
Payer: MEDICARE

## 2020-12-04 DIAGNOSIS — M15.9 PRIMARY OSTEOARTHRITIS INVOLVING MULTIPLE JOINTS: ICD-10-CM

## 2020-12-04 DIAGNOSIS — E21.3 HYPERPARATHYROIDISM: ICD-10-CM

## 2020-12-04 DIAGNOSIS — E06.3 HASHIMOTO'S THYROIDITIS: ICD-10-CM

## 2020-12-04 DIAGNOSIS — K90.41 NON-CELIAC GLUTEN SENSITIVITY: ICD-10-CM

## 2020-12-04 DIAGNOSIS — M35.1 MCTD (MIXED CONNECTIVE TISSUE DISEASE): ICD-10-CM

## 2020-12-04 LAB
ALBUMIN SERPL BCP-MCNC: 4 G/DL (ref 3.5–5.2)
ALP SERPL-CCNC: 119 U/L (ref 55–135)
ALT SERPL W/O P-5'-P-CCNC: 27 U/L (ref 10–44)
ANION GAP SERPL CALC-SCNC: 12 MMOL/L (ref 8–16)
AST SERPL-CCNC: 21 U/L (ref 10–40)
BASOPHILS # BLD AUTO: 0.05 K/UL (ref 0–0.2)
BASOPHILS NFR BLD: 0.7 % (ref 0–1.9)
BILIRUB SERPL-MCNC: 0.5 MG/DL (ref 0.1–1)
BUN SERPL-MCNC: 11 MG/DL (ref 8–23)
CA-I BLDV-SCNC: 1.36 MMOL/L (ref 1.06–1.42)
CALCIUM SERPL-MCNC: 10 MG/DL (ref 8.7–10.5)
CHLORIDE SERPL-SCNC: 107 MMOL/L (ref 95–110)
CO2 SERPL-SCNC: 22 MMOL/L (ref 23–29)
CREAT SERPL-MCNC: 0.8 MG/DL (ref 0.5–1.4)
CRP SERPL-MCNC: 7.4 MG/L (ref 0–8.2)
DIFFERENTIAL METHOD: ABNORMAL
EOSINOPHIL # BLD AUTO: 0.3 K/UL (ref 0–0.5)
EOSINOPHIL NFR BLD: 3.6 % (ref 0–8)
ERYTHROCYTE [DISTWIDTH] IN BLOOD BY AUTOMATED COUNT: 13.8 % (ref 11.5–14.5)
ERYTHROCYTE [SEDIMENTATION RATE] IN BLOOD BY WESTERGREN METHOD: 7 MM/HR (ref 0–20)
EST. GFR  (AFRICAN AMERICAN): >60 ML/MIN/1.73 M^2
EST. GFR  (NON AFRICAN AMERICAN): >60 ML/MIN/1.73 M^2
GLUCOSE SERPL-MCNC: 109 MG/DL (ref 70–110)
HCT VFR BLD AUTO: 45.9 % (ref 37–48.5)
HGB BLD-MCNC: 13.9 G/DL (ref 12–16)
IGM SERPL-MCNC: 39 MG/DL (ref 50–300)
IMM GRANULOCYTES # BLD AUTO: 0.01 K/UL (ref 0–0.04)
IMM GRANULOCYTES NFR BLD AUTO: 0.1 % (ref 0–0.5)
LYMPHOCYTES # BLD AUTO: 2.2 K/UL (ref 1–4.8)
LYMPHOCYTES NFR BLD: 30.4 % (ref 18–48)
MCH RBC QN AUTO: 28 PG (ref 27–31)
MCHC RBC AUTO-ENTMCNC: 30.3 G/DL (ref 32–36)
MCV RBC AUTO: 92 FL (ref 82–98)
MONOCYTES # BLD AUTO: 0.4 K/UL (ref 0.3–1)
MONOCYTES NFR BLD: 5.3 % (ref 4–15)
NEUTROPHILS # BLD AUTO: 4.3 K/UL (ref 1.8–7.7)
NEUTROPHILS NFR BLD: 59.9 % (ref 38–73)
NRBC BLD-RTO: 0 /100 WBC
PLATELET # BLD AUTO: 303 K/UL (ref 150–350)
PMV BLD AUTO: 9.6 FL (ref 9.2–12.9)
POTASSIUM SERPL-SCNC: 4.2 MMOL/L (ref 3.5–5.1)
PROT SERPL-MCNC: 7.3 G/DL (ref 6–8.4)
RBC # BLD AUTO: 4.97 M/UL (ref 4–5.4)
SODIUM SERPL-SCNC: 141 MMOL/L (ref 136–145)
T3FREE SERPL-MCNC: 3 PG/ML (ref 2.3–4.2)
T4 FREE SERPL-MCNC: 1.04 NG/DL (ref 0.71–1.51)
THYROGLOB AB SERPL IA-ACNC: <4 IU/ML (ref 0–3.9)
THYROPEROXIDASE IGG SERPL-ACNC: <6 IU/ML
TSH SERPL DL<=0.005 MIU/L-ACNC: 2.27 UIU/ML (ref 0.4–4)
WBC # BLD AUTO: 7.13 K/UL (ref 3.9–12.7)

## 2020-12-04 PROCEDURE — 85651 RBC SED RATE NONAUTOMATED: CPT | Mod: PO

## 2020-12-04 PROCEDURE — 84443 ASSAY THYROID STIM HORMONE: CPT

## 2020-12-04 PROCEDURE — 86140 C-REACTIVE PROTEIN: CPT

## 2020-12-04 PROCEDURE — 82330 ASSAY OF CALCIUM: CPT

## 2020-12-04 PROCEDURE — 83970 ASSAY OF PARATHORMONE: CPT

## 2020-12-04 PROCEDURE — 82784 ASSAY IGA/IGD/IGG/IGM EACH: CPT

## 2020-12-04 PROCEDURE — 86038 ANTINUCLEAR ANTIBODIES: CPT

## 2020-12-04 PROCEDURE — 84439 ASSAY OF FREE THYROXINE: CPT

## 2020-12-04 PROCEDURE — 80053 COMPREHEN METABOLIC PANEL: CPT

## 2020-12-04 PROCEDURE — 86800 THYROGLOBULIN ANTIBODY: CPT

## 2020-12-04 PROCEDURE — 36415 COLL VENOUS BLD VENIPUNCTURE: CPT | Mod: PO

## 2020-12-04 PROCEDURE — 86235 NUCLEAR ANTIGEN ANTIBODY: CPT | Mod: 59

## 2020-12-04 PROCEDURE — 84481 FREE ASSAY (FT-3): CPT

## 2020-12-04 PROCEDURE — 86039 ANTINUCLEAR ANTIBODIES (ANA): CPT

## 2020-12-04 PROCEDURE — 86376 MICROSOMAL ANTIBODY EACH: CPT

## 2020-12-04 PROCEDURE — 85025 COMPLETE CBC W/AUTO DIFF WBC: CPT

## 2020-12-05 LAB — PTH-INTACT SERPL-MCNC: 110 PG/ML (ref 9–77)

## 2020-12-07 LAB
ANA PATTERN 1: NORMAL
ANA PATTERN 2: NORMAL
ANA SER QL IF: POSITIVE
ANA TITR SER IF: NORMAL {TITER}

## 2020-12-08 LAB
ANA TITER 2: NORMAL
ANTI SM ANTIBODY: 0.07 RATIO (ref 0–0.99)
ANTI SM/RNP ANTIBODY: 0.08 RATIO (ref 0–0.99)
ANTI-SM INTERPRETATION: NEGATIVE
ANTI-SM/RNP INTERPRETATION: NEGATIVE
ANTI-SSA ANTIBODY: 0.08 RATIO (ref 0–0.99)
ANTI-SSA INTERPRETATION: NEGATIVE
ANTI-SSB ANTIBODY: 0.1 RATIO (ref 0–0.99)
ANTI-SSB INTERPRETATION: NEGATIVE
DSDNA AB SER-ACNC: NORMAL [IU]/ML

## 2020-12-09 ENCOUNTER — OFFICE VISIT (OUTPATIENT)
Dept: RHEUMATOLOGY | Facility: CLINIC | Age: 75
End: 2020-12-09
Payer: MEDICARE

## 2020-12-09 VITALS
HEART RATE: 91 BPM | BODY MASS INDEX: 27.8 KG/M2 | WEIGHT: 152 LBS | SYSTOLIC BLOOD PRESSURE: 139 MMHG | DIASTOLIC BLOOD PRESSURE: 82 MMHG

## 2020-12-09 DIAGNOSIS — E21.3 HYPERPARATHYROIDISM: ICD-10-CM

## 2020-12-09 DIAGNOSIS — M15.9 PRIMARY OSTEOARTHRITIS INVOLVING MULTIPLE JOINTS: ICD-10-CM

## 2020-12-09 DIAGNOSIS — E06.3 HASHIMOTO'S THYROIDITIS: ICD-10-CM

## 2020-12-09 DIAGNOSIS — M35.1 MCTD (MIXED CONNECTIVE TISSUE DISEASE): Primary | ICD-10-CM

## 2020-12-09 PROCEDURE — 96372 THER/PROPH/DIAG INJ SC/IM: CPT | Mod: S$GLB,,, | Performed by: PHYSICIAN ASSISTANT

## 2020-12-09 PROCEDURE — 3075F SYST BP GE 130 - 139MM HG: CPT | Mod: CPTII,S$GLB,, | Performed by: PHYSICIAN ASSISTANT

## 2020-12-09 PROCEDURE — 3075F PR MOST RECENT SYSTOLIC BLOOD PRESS GE 130-139MM HG: ICD-10-PCS | Mod: CPTII,S$GLB,, | Performed by: PHYSICIAN ASSISTANT

## 2020-12-09 PROCEDURE — 99214 OFFICE O/P EST MOD 30 MIN: CPT | Mod: 25,S$GLB,, | Performed by: PHYSICIAN ASSISTANT

## 2020-12-09 PROCEDURE — 96372 PR INJECTION,THERAP/PROPH/DIAG2ST, IM OR SUBCUT: ICD-10-PCS | Mod: S$GLB,,, | Performed by: PHYSICIAN ASSISTANT

## 2020-12-09 PROCEDURE — 99214 PR OFFICE/OUTPT VISIT, EST, LEVL IV, 30-39 MIN: ICD-10-PCS | Mod: 25,S$GLB,, | Performed by: PHYSICIAN ASSISTANT

## 2020-12-09 PROCEDURE — 99999 PR PBB SHADOW E&M-EST. PATIENT-LVL IV: ICD-10-PCS | Mod: PBBFAC,,, | Performed by: PHYSICIAN ASSISTANT

## 2020-12-09 PROCEDURE — 1159F MED LIST DOCD IN RCRD: CPT | Mod: S$GLB,,, | Performed by: PHYSICIAN ASSISTANT

## 2020-12-09 PROCEDURE — 99999 PR PBB SHADOW E&M-EST. PATIENT-LVL IV: CPT | Mod: PBBFAC,,, | Performed by: PHYSICIAN ASSISTANT

## 2020-12-09 PROCEDURE — 1125F PR PAIN SEVERITY QUANTIFIED, PAIN PRESENT: ICD-10-PCS | Mod: S$GLB,,, | Performed by: PHYSICIAN ASSISTANT

## 2020-12-09 PROCEDURE — 1125F AMNT PAIN NOTED PAIN PRSNT: CPT | Mod: S$GLB,,, | Performed by: PHYSICIAN ASSISTANT

## 2020-12-09 PROCEDURE — 3079F PR MOST RECENT DIASTOLIC BLOOD PRESSURE 80-89 MM HG: ICD-10-PCS | Mod: CPTII,S$GLB,, | Performed by: PHYSICIAN ASSISTANT

## 2020-12-09 PROCEDURE — 3079F DIAST BP 80-89 MM HG: CPT | Mod: CPTII,S$GLB,, | Performed by: PHYSICIAN ASSISTANT

## 2020-12-09 PROCEDURE — 1159F PR MEDICATION LIST DOCUMENTED IN MEDICAL RECORD: ICD-10-PCS | Mod: S$GLB,,, | Performed by: PHYSICIAN ASSISTANT

## 2020-12-09 RX ORDER — NAPROXEN 375 MG/1
375 TABLET ORAL 2 TIMES DAILY WITH MEALS
Qty: 60 TABLET | Refills: 3 | Status: SHIPPED | OUTPATIENT
Start: 2020-12-09 | End: 2021-03-29 | Stop reason: SDUPTHER

## 2020-12-09 RX ORDER — PREDNISONE 2.5 MG/1
5 TABLET ORAL DAILY PRN
Qty: 60 TABLET | Refills: 1 | Status: SHIPPED | OUTPATIENT
Start: 2020-12-09 | End: 2021-01-28 | Stop reason: SDUPTHER

## 2020-12-09 RX ORDER — NAPROXEN SODIUM 220 MG
220 TABLET ORAL 2 TIMES DAILY WITH MEALS
COMMUNITY
End: 2021-10-07

## 2020-12-09 RX ORDER — CYANOCOBALAMIN 1000 UG/ML
1000 INJECTION, SOLUTION INTRAMUSCULAR; SUBCUTANEOUS
Status: COMPLETED | OUTPATIENT
Start: 2020-12-09 | End: 2020-12-09

## 2020-12-09 RX ORDER — METHOCARBAMOL 750 MG/1
750 TABLET, FILM COATED ORAL 2 TIMES DAILY PRN
Qty: 60 TABLET | Refills: 2 | Status: SHIPPED | OUTPATIENT
Start: 2020-12-09 | End: 2021-03-03 | Stop reason: SDUPTHER

## 2020-12-09 RX ORDER — KETOROLAC TROMETHAMINE 30 MG/ML
60 INJECTION, SOLUTION INTRAMUSCULAR; INTRAVENOUS
Status: COMPLETED | OUTPATIENT
Start: 2020-12-09 | End: 2020-12-09

## 2020-12-09 RX ADMIN — KETOROLAC TROMETHAMINE 60 MG: 30 INJECTION, SOLUTION INTRAMUSCULAR; INTRAVENOUS at 11:12

## 2020-12-09 RX ADMIN — CYANOCOBALAMIN 1000 MCG: 1000 INJECTION, SOLUTION INTRAMUSCULAR; SUBCUTANEOUS at 11:12

## 2020-12-09 ASSESSMENT — ROUTINE ASSESSMENT OF PATIENT INDEX DATA (RAPID3)
PATIENT GLOBAL ASSESSMENT SCORE: 4.5
MDHAQ FUNCTION SCORE: 0.9
TOTAL RAPID3 SCORE: 5.17
FATIGUE SCORE: 1.1
PAIN SCORE: 8
PSYCHOLOGICAL DISTRESS SCORE: 2.2

## 2020-12-09 NOTE — PROGRESS NOTES
Subjective:       Patient ID: Elham Arteaga is a 75 y.o. female.    Chief Complaint: Disease Management    Mrs. Arteaga is a 75 year old female who presents to clinic for follow up on MCTD. She is a new patient to me and recently established care with Dr. De La Torre. She was diagnosed with MCTD, NCGS, Hashimoto's thyroiditis. She has been doing poorly since her last visit. Plaquenil was added in August and she developed significant rash with this and it was d/luh. She reports increase joint pain involving her hands, knees, hips, and low back. Pain is constant and aching. Low back pain is severe, worse with prolonged standing or any significant activity such as vacuuming--tried family members robaxin with good relief.     She is having difficulty with opening jars--wearing compression gloves at night and CMC brace as well. Hands and feet are always cold. Rash on the elbows has nearly resolved since she started gluten free diet. She has known OA hips and knees--followed by Dr. Loving for IACS prn. She also d/luh mobic due to s/e.     Plans to est care with Dr. Arzola for hyperparathyroidism    Prior tx:  1. Plaquenil  2. mobic    Review of Systems   Constitutional: Positive for activity change and fatigue. Negative for appetite change, chills and fever.   Eyes: Negative for visual disturbance.   Respiratory: Negative for cough and shortness of breath.    Cardiovascular: Negative for chest pain, palpitations and leg swelling.   Gastrointestinal: Negative for abdominal pain, constipation, diarrhea, nausea and vomiting.   Musculoskeletal: Positive for arthralgias, back pain, joint swelling and myalgias.   Neurological: Negative for dizziness, weakness, light-headedness and headaches.         Objective:     Vitals:    12/09/20 1019   BP: 139/82   Pulse: 91       Past Medical History:   Diagnosis Date    Acute esophageal obstruction     Asthma     seasonal    Complex tear of lateral meniscus of right knee as current injury  3/28/2017    Depression     Duodenal ulcer     without hemorrhage or perforation     Dysphagia, unspecified(787.20)     Eosinophilic esophagitis     Eosinophilic esophagitis     Fibrocystic breast     Generalized osteoarthrosis, involving multiple sites     GERD (gastroesophageal reflux disease)     Hyperlipidemia     Hypothyroidism     Lipoma of unspecified site     Other specified cardiac dysrhythmias(427.89)     PONV (postoperative nausea and vomiting)     Screening for other and unspecified cardiovascular conditions     Sinus problem     Stricture and stenosis of esophagus     Thyroid disease     hypothyroidism    Trigger ring finger of right hand     Unspecified asthma(493.90)      Past Surgical History:   Procedure Laterality Date    214.9      ADENOIDECTOMY      ANKLE SURGERY  2009    cyst, left     SECTION, LOW TRANSVERSE      times 1    CHOLECYSTECTOMY      ESOPHAGOGASTRODUODENOSCOPY N/A 2019    Procedure: EGD (ESOPHAGOGASTRODUODENOSCOPY);  Surgeon: Brandon Negron MD;  Location: [facility];  Service: Endoscopy;  Laterality: N/A;    HAND SURGERY      HERNIA REPAIR      left inguinal    HYSTERECTOMY      OOPHORECTOMY      TONSILLECTOMY      UPPER GASTROINTESTINAL ENDOSCOPY      VAGINAL DELIVERY      times 2          Physical Exam   Constitutional: She is well-developed, well-nourished, and in no distress.   Eyes: Right conjunctiva is not injected. Left conjunctiva is not injected. Right eye exhibits normal extraocular motion. Left eye exhibits normal extraocular motion.   Neck: No JVD present. No thyromegaly present.   Cardiovascular: Normal rate and regular rhythm.  Exam reveals no decreased pulses.    Pulmonary/Chest: Effort normal.       Right Side Rheumatological Exam     Examination finds the shoulder, elbow, knee, 1st MCP, 2nd MCP, 3rd MCP, 4th MCP and 5th MCP normal.    The patient is tender to palpation of the wrist, 1st PIP, 2nd PIP, 3rd PIP, 4th  PIP, 5th PIP and 1st CMC    She has swelling of the wrist, 1st PIP, 2nd PIP, 3rd PIP, 4th PIP and 5th PIP    Left Side Rheumatological Exam     Examination finds the shoulder, elbow, wrist, knee, 2nd MCP, 3rd MCP, 4th MCP and 5th MCP normal.    The patient is tender to palpation of the 1st PIP, 1st MCP, 2nd PIP, 3rd PIP, 4th PIP, 5th PIP and 1st CMC.    She has swelling of the 1st PIP, 1st MCP, 2nd PIP, 3rd PIP, 4th PIP and 5th PIP      Back/Neck Exam   Tenderness Right paramedian tenderness of the Lower L-Spine and Upper L-Spine.Left paramedian tenderness of the Lower L-Spine and Upper L-Spine.      Lymphadenopathy:     She has no cervical adenopathy.   Neurological: Gait normal.   Skin: No rash noted.     Psychiatric: Mood and affect normal.       Labs reviewed:  Component      Latest Ref Rng & Units 12/4/2020   WBC      3.90 - 12.70 K/uL 7.13   RBC      4.00 - 5.40 M/uL 4.97   Hemoglobin      12.0 - 16.0 g/dL 13.9   Hematocrit      37.0 - 48.5 % 45.9   MCV      82 - 98 fL 92   MCH      27.0 - 31.0 pg 28.0   MCHC      32.0 - 36.0 g/dL 30.3 (L)   RDW      11.5 - 14.5 % 13.8   Platelets      150 - 350 K/uL 303   MPV      9.2 - 12.9 fL 9.6   Immature Granulocytes      0.0 - 0.5 % 0.1   Gran # (ANC)      1.8 - 7.7 K/uL 4.3   Immature Grans (Abs)      0.00 - 0.04 K/uL 0.01   Lymph #      1.0 - 4.8 K/uL 2.2   Mono #      0.3 - 1.0 K/uL 0.4   Eos #      0.0 - 0.5 K/uL 0.3   Baso #      0.00 - 0.20 K/uL 0.05   nRBC      0 /100 WBC 0   Gran %      38.0 - 73.0 % 59.9   Lymph %      18.0 - 48.0 % 30.4   Mono %      4.0 - 15.0 % 5.3   Eosinophil %      0.0 - 8.0 % 3.6   Basophil %      0.0 - 1.9 % 0.7   Differential Method       Automated   Sodium      136 - 145 mmol/L 141   Potassium      3.5 - 5.1 mmol/L 4.2   Chloride      95 - 110 mmol/L 107   CO2      23 - 29 mmol/L 22 (L)   Glucose      70 - 110 mg/dL 109   BUN      8 - 23 mg/dL 11   Creatinine      0.5 - 1.4 mg/dL 0.8   Calcium      8.7 - 10.5 mg/dL 10.0   PROTEIN  TOTAL      6.0 - 8.4 g/dL 7.3   Albumin      3.5 - 5.2 g/dL 4.0   BILIRUBIN TOTAL      0.1 - 1.0 mg/dL 0.5   Alkaline Phosphatase      55 - 135 U/L 119   AST      10 - 40 U/L 21   ALT      10 - 44 U/L 27   Anion Gap      8 - 16 mmol/L 12   eGFR if African American      >60 mL/min/1.73 m:2 >60.0   eGFR if non African American      >60 mL/min/1.73 m:2 >60.0   Anti Sm Antibody      0.00 - 0.99 Ratio 0.07   Anti-Sm Interpretation      Negative Negative   Anti-SSA Antibody      0.00 - 0.99 Ratio 0.08   Anti-SSA Interpretation      Negative Negative   Anti-SSB Antibody      0.00 - 0.99 Ratio 0.10   Anti-SSB Interpretation      Negative Negative   ds DNA Ab      Negative 1:10 Negative 1:10   Anti Sm/RNP Antibody      0.00 - 0.99 Ratio 0.08   Anti-Sm/RNP Interpretation      Negative Negative   Sed Rate      0 - 20 mm/Hr 7   CRP      0.0 - 8.2 mg/L 7.4   MATEUSZ Screen      Negative <1:80 Positive (A)   IgM      50 - 300 mg/dL 39 (L)   Thyroglobulin Ab Screen      0.0 - 3.9 IU/mL <4.0   Thyroperoxidase Antibodies      <6.0 IU/mL <6.0   TSH      0.400 - 4.000 uIU/mL 2.273   Free T4      0.71 - 1.51 ng/dL 1.04   T3, Free      2.3 - 4.2 pg/mL 3.0   PTH      9.0 - 77.0 pg/mL 110.0 (H)   Ionized Calcium      1.06 - 1.42 mmol/L 1.36   MATEUSZ Pattern 2       Speckled   MATEUSZ Titer 1       1:320   MATEUSZ PATTERN 1       Homogeneous   MATEUSZ Titer 2       1:320     Assessment:       1. MCTD (mixed connective tissue disease)    2. Hashimoto's thyroiditis    3. Primary osteoarthritis involving multiple joints    4. Hyperparathyroidism            Plan:       MCTD (mixed connective tissue disease)  -     ketorolac injection 60 mg  -     cyanocobalamin injection 1,000 mcg  -     predniSONE (DELTASONE) 2.5 MG tablet; Take 2 tablets (5 mg total) by mouth daily as needed (arthritis flares).  Dispense: 60 tablet; Refill: 1  -     methocarbamoL (ROBAXIN) 750 MG Tab; Take 1 tablet (750 mg total) by mouth 2 (two) times daily as needed.  Dispense: 60 tablet;  Refill: 2  -     naproxen (EC-NAPROSYN) 375 MG TbEC EC tablet; Take 1 tablet (375 mg total) by mouth 2 (two) times daily with meals.  Dispense: 60 tablet; Refill: 3  -     CBC Auto Differential; Future; Expected date: 12/09/2020  -     Comprehensive Metabolic Panel; Future; Expected date: 12/09/2020  -     C-Reactive Protein; Future; Expected date: 12/09/2020  -     Sedimentation rate; Future; Expected date: 12/09/2020  -     MATEUSZ Screen w/Reflex; Future; Expected date: 12/09/2020  -     Vitamin D; Future; Expected date: 12/09/2020  -     Anti-DNA Ab, Double-Stranded; Future; Expected date: 12/09/2020  -     C3 Complement; Future; Expected date: 12/09/2020  -     C4 Complement; Future; Expected date: 12/09/2020  -     Quantiferon Gold TB; Future; Expected date: 12/09/2020  -     Hepatitis C Antibody; Future; Expected date: 12/09/2020  -     Hepatitis B Core Antibody, Total; Future; Expected date: 12/09/2020  -     Hepatitis B Surface Antigen; Future; Expected date: 12/09/2020  -     HEPATITIS B SURFACE ANTIBODY; Future; Expected date: 12/09/2020  -     Thiopurine Methyltrans (TPMT) Genotyping; Future; Expected date: 12/09/2020    Hashimoto's thyroiditis    Primary osteoarthritis involving multiple joints    Hyperparathyroidism  -     Vitamin D; Future; Expected date: 12/09/2020        Assessment:  75 year old female with  MCTD MATEUSZ 1:320 speckled, homogenous, elevated CRP  --elevated PTH  --MTHFR  --NCGS  --Osteoarthritis  --sulfa allergy    Plan:  1. toradol 60, b12 given today  2. Add naproxen 375 mg bid prn. Add robaxin 750 mg bid PRN  3. Add prednisone 2.5-5 mg daily PRN for arthritis flares  4. Est care with endocrinology  5. Cont gluten free    Will not start more aggressive tx at this time due to covid pandemic    The patient is aware that there is a COVID-19 pandemic and that the immunosuppressants being taken to treat arthritis can increased the  risk for infection/Viruses.  This patient understands  to hold  (not to take) all DMARD/Immunsuppressants with the exception of Plaquenil,  if having fever chills, cough, shortness of breath loss of sense of smell and or myalgias.  It is understood that the patient is to call the office as well as call their primary care physician and observe  social isolation    Follow up:  3-4 mo Dr. Britt ford/alex prior

## 2020-12-09 NOTE — PATIENT INSTRUCTIONS
Prednisone 2.5-5 mg daily as needed for arthritis flares (ok to take 5-7 days)    Add robaxin 750 mg twice daily as needed    Add naproxen 375 mg twice daily as needed

## 2021-01-05 ENCOUNTER — TELEPHONE (OUTPATIENT)
Dept: ENDOCRINOLOGY | Facility: CLINIC | Age: 76
End: 2021-01-05

## 2021-01-28 DIAGNOSIS — M35.1 MCTD (MIXED CONNECTIVE TISSUE DISEASE): ICD-10-CM

## 2021-01-28 RX ORDER — PREDNISONE 2.5 MG/1
5 TABLET ORAL DAILY PRN
Qty: 60 TABLET | Refills: 1 | Status: SHIPPED | OUTPATIENT
Start: 2021-01-28 | End: 2021-03-29 | Stop reason: SDUPTHER

## 2021-02-12 ENCOUNTER — OFFICE VISIT (OUTPATIENT)
Dept: ENDOCRINOLOGY | Facility: CLINIC | Age: 76
End: 2021-02-12
Payer: MEDICARE

## 2021-02-12 VITALS
WEIGHT: 156 LBS | SYSTOLIC BLOOD PRESSURE: 122 MMHG | BODY MASS INDEX: 28.71 KG/M2 | HEIGHT: 62 IN | DIASTOLIC BLOOD PRESSURE: 74 MMHG | OXYGEN SATURATION: 99 % | HEART RATE: 91 BPM

## 2021-02-12 DIAGNOSIS — R53.83 FATIGUE, UNSPECIFIED TYPE: ICD-10-CM

## 2021-02-12 DIAGNOSIS — E04.2 MULTIPLE THYROID NODULES: ICD-10-CM

## 2021-02-12 DIAGNOSIS — M85.80 OSTEOPENIA, UNSPECIFIED LOCATION: ICD-10-CM

## 2021-02-12 DIAGNOSIS — R79.89 ELEVATED PARATHYROID HORMONE: Primary | ICD-10-CM

## 2021-02-12 DIAGNOSIS — E03.9 HYPOTHYROIDISM, UNSPECIFIED TYPE: ICD-10-CM

## 2021-02-12 PROCEDURE — 3074F PR MOST RECENT SYSTOLIC BLOOD PRESSURE < 130 MM HG: ICD-10-PCS | Mod: CPTII,S$GLB,, | Performed by: INTERNAL MEDICINE

## 2021-02-12 PROCEDURE — 3288F FALL RISK ASSESSMENT DOCD: CPT | Mod: CPTII,S$GLB,, | Performed by: INTERNAL MEDICINE

## 2021-02-12 PROCEDURE — 3074F SYST BP LT 130 MM HG: CPT | Mod: CPTII,S$GLB,, | Performed by: INTERNAL MEDICINE

## 2021-02-12 PROCEDURE — 3078F DIAST BP <80 MM HG: CPT | Mod: CPTII,S$GLB,, | Performed by: INTERNAL MEDICINE

## 2021-02-12 PROCEDURE — 99214 PR OFFICE/OUTPT VISIT, EST, LEVL IV, 30-39 MIN: ICD-10-PCS | Mod: S$GLB,,, | Performed by: INTERNAL MEDICINE

## 2021-02-12 PROCEDURE — 3288F PR FALLS RISK ASSESSMENT DOCUMENTED: ICD-10-PCS | Mod: CPTII,S$GLB,, | Performed by: INTERNAL MEDICINE

## 2021-02-12 PROCEDURE — 1159F PR MEDICATION LIST DOCUMENTED IN MEDICAL RECORD: ICD-10-PCS | Mod: S$GLB,,, | Performed by: INTERNAL MEDICINE

## 2021-02-12 PROCEDURE — 99214 OFFICE O/P EST MOD 30 MIN: CPT | Mod: S$GLB,,, | Performed by: INTERNAL MEDICINE

## 2021-02-12 PROCEDURE — 1159F MED LIST DOCD IN RCRD: CPT | Mod: S$GLB,,, | Performed by: INTERNAL MEDICINE

## 2021-02-12 PROCEDURE — 1101F PT FALLS ASSESS-DOCD LE1/YR: CPT | Mod: CPTII,S$GLB,, | Performed by: INTERNAL MEDICINE

## 2021-02-12 PROCEDURE — 1101F PR PT FALLS ASSESS DOC 0-1 FALLS W/OUT INJ PAST YR: ICD-10-PCS | Mod: CPTII,S$GLB,, | Performed by: INTERNAL MEDICINE

## 2021-02-12 PROCEDURE — 99999 PR PBB SHADOW E&M-EST. PATIENT-LVL IV: ICD-10-PCS | Mod: PBBFAC,,, | Performed by: INTERNAL MEDICINE

## 2021-02-12 PROCEDURE — 1126F AMNT PAIN NOTED NONE PRSNT: CPT | Mod: S$GLB,,, | Performed by: INTERNAL MEDICINE

## 2021-02-12 PROCEDURE — 99999 PR PBB SHADOW E&M-EST. PATIENT-LVL IV: CPT | Mod: PBBFAC,,, | Performed by: INTERNAL MEDICINE

## 2021-02-12 PROCEDURE — 1126F PR PAIN SEVERITY QUANTIFIED, NO PAIN PRESENT: ICD-10-PCS | Mod: S$GLB,,, | Performed by: INTERNAL MEDICINE

## 2021-02-12 PROCEDURE — 3078F PR MOST RECENT DIASTOLIC BLOOD PRESSURE < 80 MM HG: ICD-10-PCS | Mod: CPTII,S$GLB,, | Performed by: INTERNAL MEDICINE

## 2021-02-17 ENCOUNTER — LAB VISIT (OUTPATIENT)
Dept: LAB | Facility: HOSPITAL | Age: 76
End: 2021-02-17
Attending: INTERNAL MEDICINE
Payer: MEDICARE

## 2021-02-17 DIAGNOSIS — M85.80 OSTEOPENIA, UNSPECIFIED LOCATION: ICD-10-CM

## 2021-02-17 DIAGNOSIS — E03.9 HYPOTHYROIDISM, UNSPECIFIED TYPE: ICD-10-CM

## 2021-02-17 DIAGNOSIS — R53.83 FATIGUE, UNSPECIFIED TYPE: ICD-10-CM

## 2021-02-17 DIAGNOSIS — R79.89 ELEVATED PARATHYROID HORMONE: ICD-10-CM

## 2021-02-17 LAB
25(OH)D3+25(OH)D2 SERPL-MCNC: 49 NG/ML (ref 30–96)
ALBUMIN SERPL BCP-MCNC: 4.2 G/DL (ref 3.5–5.2)
ALP SERPL-CCNC: 107 U/L (ref 55–135)
ALT SERPL W/O P-5'-P-CCNC: 22 U/L (ref 10–44)
ANION GAP SERPL CALC-SCNC: 10 MMOL/L (ref 8–16)
AST SERPL-CCNC: 21 U/L (ref 10–40)
BILIRUB SERPL-MCNC: 0.8 MG/DL (ref 0.1–1)
BUN SERPL-MCNC: 16 MG/DL (ref 8–23)
CALCIUM 24H UR-MRATE: 5 MG/HR (ref 4–12)
CALCIUM SERPL-MCNC: 9.8 MG/DL (ref 8.7–10.5)
CALCIUM UR-MCNC: 5.9 MG/DL (ref 0–15)
CALCIUM URINE (MG/SPEC): 121 MG/SPEC
CHLORIDE SERPL-SCNC: 106 MMOL/L (ref 95–110)
CO2 SERPL-SCNC: 26 MMOL/L (ref 23–29)
CORTIS SERPL-MCNC: 15.3 UG/DL
CREAT 24H UR-MRATE: 23.1 MG/HR (ref 40–75)
CREAT SERPL-MCNC: 0.9 MG/DL (ref 0.5–1.4)
CREAT UR-MCNC: 27 MG/DL (ref 15–325)
CREATININE, URINE (MG/SPEC): 553.5 MG/SPEC
EST. GFR  (AFRICAN AMERICAN): >60 ML/MIN/1.73 M^2
EST. GFR  (NON AFRICAN AMERICAN): >60 ML/MIN/1.73 M^2
GLUCOSE SERPL-MCNC: 102 MG/DL (ref 70–110)
PHOSPHATE SERPL-MCNC: 3.2 MG/DL (ref 2.7–4.5)
POTASSIUM SERPL-SCNC: 4.1 MMOL/L (ref 3.5–5.1)
PROT SERPL-MCNC: 7.4 G/DL (ref 6–8.4)
SODIUM SERPL-SCNC: 142 MMOL/L (ref 136–145)
URINE COLLECTION DURATION: 24 HR
URINE COLLECTION DURATION: 24 HR
URINE VOLUME: 2050 ML
URINE VOLUME: 2050 ML

## 2021-02-17 PROCEDURE — 80053 COMPREHEN METABOLIC PANEL: CPT

## 2021-02-17 PROCEDURE — 82306 VITAMIN D 25 HYDROXY: CPT

## 2021-02-17 PROCEDURE — 82024 ASSAY OF ACTH: CPT

## 2021-02-17 PROCEDURE — 36415 COLL VENOUS BLD VENIPUNCTURE: CPT | Mod: PO

## 2021-02-17 PROCEDURE — 82570 ASSAY OF URINE CREATININE: CPT

## 2021-02-17 PROCEDURE — 84100 ASSAY OF PHOSPHORUS: CPT

## 2021-02-17 PROCEDURE — 82533 TOTAL CORTISOL: CPT

## 2021-02-17 PROCEDURE — 83970 ASSAY OF PARATHORMONE: CPT

## 2021-02-17 PROCEDURE — 82340 ASSAY OF CALCIUM IN URINE: CPT

## 2021-02-18 LAB — PTH-INTACT SERPL-MCNC: 105 PG/ML (ref 9–77)

## 2021-02-19 LAB — ACTH PLAS-MCNC: 20 PG/ML (ref 0–46)

## 2021-02-22 ENCOUNTER — HOSPITAL ENCOUNTER (OUTPATIENT)
Dept: RADIOLOGY | Facility: HOSPITAL | Age: 76
Discharge: HOME | End: 2021-02-22
Attending: INTERNAL MEDICINE
Payer: MEDICARE

## 2021-02-22 DIAGNOSIS — R53.83 FATIGUE, UNSPECIFIED TYPE: ICD-10-CM

## 2021-02-22 DIAGNOSIS — E03.9 HYPOTHYROIDISM, UNSPECIFIED TYPE: ICD-10-CM

## 2021-02-22 DIAGNOSIS — R79.89 ELEVATED PARATHYROID HORMONE: ICD-10-CM

## 2021-02-22 DIAGNOSIS — M85.80 OSTEOPENIA, UNSPECIFIED LOCATION: ICD-10-CM

## 2021-02-22 PROCEDURE — 76536 US SOFT TISSUE HEAD NECK THYROID: ICD-10-PCS | Mod: 26,,, | Performed by: RADIOLOGY

## 2021-02-22 PROCEDURE — 76536 US EXAM OF HEAD AND NECK: CPT | Mod: TC,PO

## 2021-02-22 PROCEDURE — 76536 US EXAM OF HEAD AND NECK: CPT | Mod: 26,,, | Performed by: RADIOLOGY

## 2021-03-01 ENCOUNTER — TELEPHONE (OUTPATIENT)
Dept: RHEUMATOLOGY | Facility: CLINIC | Age: 76
End: 2021-03-01

## 2021-03-03 DIAGNOSIS — M35.1 MCTD (MIXED CONNECTIVE TISSUE DISEASE): ICD-10-CM

## 2021-03-03 RX ORDER — METHOCARBAMOL 750 MG/1
750 TABLET, FILM COATED ORAL 2 TIMES DAILY PRN
Qty: 60 TABLET | Refills: 2 | Status: SHIPPED | OUTPATIENT
Start: 2021-03-03 | End: 2021-04-30 | Stop reason: SDUPTHER

## 2021-03-04 ENCOUNTER — PATIENT MESSAGE (OUTPATIENT)
Dept: ENDOCRINOLOGY | Facility: CLINIC | Age: 76
End: 2021-03-04

## 2021-03-04 DIAGNOSIS — R79.89 ELEVATED PARATHYROID HORMONE: Primary | ICD-10-CM

## 2021-03-05 ENCOUNTER — PATIENT MESSAGE (OUTPATIENT)
Dept: ENDOCRINOLOGY | Facility: CLINIC | Age: 76
End: 2021-03-05

## 2021-03-05 RX ORDER — LEVOTHYROXINE SODIUM 50 UG/1
50 TABLET ORAL
Qty: 30 TABLET | Refills: 11 | Status: SHIPPED | OUTPATIENT
Start: 2021-03-05 | End: 2021-04-27 | Stop reason: SDUPTHER

## 2021-03-29 DIAGNOSIS — M35.1 MCTD (MIXED CONNECTIVE TISSUE DISEASE): ICD-10-CM

## 2021-03-29 RX ORDER — PREDNISONE 2.5 MG/1
5 TABLET ORAL DAILY PRN
Qty: 60 TABLET | Refills: 3 | Status: SHIPPED | OUTPATIENT
Start: 2021-03-29 | End: 2021-04-30 | Stop reason: SDUPTHER

## 2021-03-29 RX ORDER — NAPROXEN 375 MG/1
375 TABLET ORAL 2 TIMES DAILY WITH MEALS
Qty: 60 TABLET | Refills: 3 | Status: SHIPPED | OUTPATIENT
Start: 2021-03-29 | End: 2021-04-30 | Stop reason: SDUPTHER

## 2021-03-29 RX ORDER — NAPROXEN SODIUM 220 MG
220 TABLET ORAL 2 TIMES DAILY WITH MEALS
Status: CANCELLED | COMMUNITY
Start: 2021-03-29

## 2021-04-05 ENCOUNTER — LAB VISIT (OUTPATIENT)
Dept: LAB | Facility: HOSPITAL | Age: 76
End: 2021-04-05
Attending: PHYSICIAN ASSISTANT
Payer: MEDICARE

## 2021-04-05 DIAGNOSIS — M35.1 MCTD (MIXED CONNECTIVE TISSUE DISEASE): ICD-10-CM

## 2021-04-05 DIAGNOSIS — E21.3 HYPERPARATHYROIDISM: ICD-10-CM

## 2021-04-05 PROBLEM — I47.10 PAROXYSMAL SUPRAVENTRICULAR TACHYCARDIA: Status: ACTIVE | Noted: 2021-04-05

## 2021-04-05 PROBLEM — D80.4 IGM DEFICIENCY: Status: ACTIVE | Noted: 2021-04-05

## 2021-04-05 LAB
ALBUMIN SERPL BCP-MCNC: 4 G/DL (ref 3.5–5.2)
ALP SERPL-CCNC: 111 U/L (ref 55–135)
ALT SERPL W/O P-5'-P-CCNC: 21 U/L (ref 10–44)
ANION GAP SERPL CALC-SCNC: 9 MMOL/L (ref 8–16)
AST SERPL-CCNC: 22 U/L (ref 10–40)
BASOPHILS # BLD AUTO: 0.06 K/UL (ref 0–0.2)
BASOPHILS NFR BLD: 0.8 % (ref 0–1.9)
BILIRUB SERPL-MCNC: 0.7 MG/DL (ref 0.1–1)
BUN SERPL-MCNC: 20 MG/DL (ref 8–23)
CALCIUM SERPL-MCNC: 10 MG/DL (ref 8.7–10.5)
CHLORIDE SERPL-SCNC: 105 MMOL/L (ref 95–110)
CO2 SERPL-SCNC: 24 MMOL/L (ref 23–29)
CREAT SERPL-MCNC: 0.8 MG/DL (ref 0.5–1.4)
CRP SERPL-MCNC: 8.4 MG/L (ref 0–8.2)
DIFFERENTIAL METHOD: ABNORMAL
EOSINOPHIL # BLD AUTO: 0.2 K/UL (ref 0–0.5)
EOSINOPHIL NFR BLD: 2.9 % (ref 0–8)
ERYTHROCYTE [DISTWIDTH] IN BLOOD BY AUTOMATED COUNT: 14.1 % (ref 11.5–14.5)
ERYTHROCYTE [SEDIMENTATION RATE] IN BLOOD BY WESTERGREN METHOD: 7 MM/HR (ref 0–20)
EST. GFR  (AFRICAN AMERICAN): >60 ML/MIN/1.73 M^2
EST. GFR  (NON AFRICAN AMERICAN): >60 ML/MIN/1.73 M^2
GLUCOSE SERPL-MCNC: 103 MG/DL (ref 70–110)
HCT VFR BLD AUTO: 41.5 % (ref 37–48.5)
HGB BLD-MCNC: 12.7 G/DL (ref 12–16)
IMM GRANULOCYTES # BLD AUTO: 0.02 K/UL (ref 0–0.04)
IMM GRANULOCYTES NFR BLD AUTO: 0.3 % (ref 0–0.5)
LYMPHOCYTES # BLD AUTO: 2.2 K/UL (ref 1–4.8)
LYMPHOCYTES NFR BLD: 29.1 % (ref 18–48)
MCH RBC QN AUTO: 27.9 PG (ref 27–31)
MCHC RBC AUTO-ENTMCNC: 30.6 G/DL (ref 32–36)
MCV RBC AUTO: 91 FL (ref 82–98)
MONOCYTES # BLD AUTO: 0.5 K/UL (ref 0.3–1)
MONOCYTES NFR BLD: 6.5 % (ref 4–15)
NEUTROPHILS # BLD AUTO: 4.5 K/UL (ref 1.8–7.7)
NEUTROPHILS NFR BLD: 60.4 % (ref 38–73)
NRBC BLD-RTO: 0 /100 WBC
PLATELET # BLD AUTO: 287 K/UL (ref 150–450)
PMV BLD AUTO: 9.6 FL (ref 9.2–12.9)
POTASSIUM SERPL-SCNC: 4.3 MMOL/L (ref 3.5–5.1)
PROT SERPL-MCNC: 7.1 G/DL (ref 6–8.4)
RBC # BLD AUTO: 4.56 M/UL (ref 4–5.4)
SODIUM SERPL-SCNC: 138 MMOL/L (ref 136–145)
WBC # BLD AUTO: 7.5 K/UL (ref 3.9–12.7)

## 2021-04-05 PROCEDURE — 80053 COMPREHEN METABOLIC PANEL: CPT | Performed by: PHYSICIAN ASSISTANT

## 2021-04-05 PROCEDURE — 0034U TPMT NUDT15 GENES: CPT | Performed by: PHYSICIAN ASSISTANT

## 2021-04-05 PROCEDURE — 86038 ANTINUCLEAR ANTIBODIES: CPT | Performed by: PHYSICIAN ASSISTANT

## 2021-04-05 PROCEDURE — 86140 C-REACTIVE PROTEIN: CPT | Performed by: PHYSICIAN ASSISTANT

## 2021-04-05 PROCEDURE — 86160 COMPLEMENT ANTIGEN: CPT | Mod: 59 | Performed by: PHYSICIAN ASSISTANT

## 2021-04-05 PROCEDURE — 36415 COLL VENOUS BLD VENIPUNCTURE: CPT | Mod: PO | Performed by: PHYSICIAN ASSISTANT

## 2021-04-05 PROCEDURE — 86704 HEP B CORE ANTIBODY TOTAL: CPT | Performed by: PHYSICIAN ASSISTANT

## 2021-04-05 PROCEDURE — 85025 COMPLETE CBC W/AUTO DIFF WBC: CPT | Performed by: PHYSICIAN ASSISTANT

## 2021-04-05 PROCEDURE — 86706 HEP B SURFACE ANTIBODY: CPT | Performed by: PHYSICIAN ASSISTANT

## 2021-04-05 PROCEDURE — 85651 RBC SED RATE NONAUTOMATED: CPT | Mod: PO | Performed by: PHYSICIAN ASSISTANT

## 2021-04-05 PROCEDURE — 86160 COMPLEMENT ANTIGEN: CPT | Performed by: PHYSICIAN ASSISTANT

## 2021-04-05 PROCEDURE — 86039 ANTINUCLEAR ANTIBODIES (ANA): CPT | Performed by: PHYSICIAN ASSISTANT

## 2021-04-05 PROCEDURE — 86235 NUCLEAR ANTIGEN ANTIBODY: CPT | Mod: 59 | Performed by: PHYSICIAN ASSISTANT

## 2021-04-05 PROCEDURE — 87340 HEPATITIS B SURFACE AG IA: CPT | Performed by: PHYSICIAN ASSISTANT

## 2021-04-05 PROCEDURE — 86803 HEPATITIS C AB TEST: CPT | Performed by: PHYSICIAN ASSISTANT

## 2021-04-05 PROCEDURE — 82306 VITAMIN D 25 HYDROXY: CPT | Performed by: PHYSICIAN ASSISTANT

## 2021-04-06 LAB
25(OH)D3+25(OH)D2 SERPL-MCNC: 49 NG/ML (ref 30–96)
ANA PATTERN 1: NORMAL
ANA SER QL IF: POSITIVE
ANA TITR SER IF: NORMAL {TITER}
C3 SERPL-MCNC: 158 MG/DL (ref 50–180)
C4 SERPL-MCNC: 18 MG/DL (ref 11–44)
DSDNA AB SER-ACNC: NORMAL [IU]/ML
HBV CORE AB SERPL QL IA: NEGATIVE
HBV SURFACE AB SER-ACNC: NEGATIVE M[IU]/ML
HBV SURFACE AG SERPL QL IA: NEGATIVE
HCV AB SERPL QL IA: NEGATIVE

## 2021-04-07 LAB
ANTI SM ANTIBODY: 0.17 RATIO (ref 0–0.99)
ANTI SM/RNP ANTIBODY: 0.13 RATIO (ref 0–0.99)
ANTI-SM INTERPRETATION: NEGATIVE
ANTI-SM/RNP INTERPRETATION: NEGATIVE
ANTI-SSA ANTIBODY: 0.08 RATIO (ref 0–0.99)
ANTI-SSA INTERPRETATION: NEGATIVE
ANTI-SSB ANTIBODY: 0.11 RATIO (ref 0–0.99)
ANTI-SSB INTERPRETATION: NEGATIVE
DSDNA AB SER-ACNC: NORMAL [IU]/ML

## 2021-04-08 LAB
NUDT15 GENOTYPE: NORMAL
NUDT15 PHENOTYPE: NORMAL
TPMT ADDITIONAL INFORMATION: NORMAL
TPMT DISCLAIMER: NORMAL
TPMT GENOTYPE RESULT: NORMAL
TPMT INTERPRETATION: NORMAL
TPMT METHOD: NORMAL
TPMT PHENOTYPE: NORMAL
TPMT REVIEWED BY: NORMAL

## 2021-04-12 ENCOUNTER — OFFICE VISIT (OUTPATIENT)
Dept: RHEUMATOLOGY | Facility: CLINIC | Age: 76
End: 2021-04-12
Payer: MEDICARE

## 2021-04-12 ENCOUNTER — SPECIALTY PHARMACY (OUTPATIENT)
Dept: PHARMACY | Facility: CLINIC | Age: 76
End: 2021-04-12

## 2021-04-12 VITALS
DIASTOLIC BLOOD PRESSURE: 77 MMHG | SYSTOLIC BLOOD PRESSURE: 122 MMHG | BODY MASS INDEX: 27.42 KG/M2 | HEIGHT: 62 IN | HEART RATE: 93 BPM | WEIGHT: 149 LBS

## 2021-04-12 DIAGNOSIS — M35.1 MCTD (MIXED CONNECTIVE TISSUE DISEASE): ICD-10-CM

## 2021-04-12 DIAGNOSIS — M65.331 TRIGGER MIDDLE FINGER OF RIGHT HAND: ICD-10-CM

## 2021-04-12 DIAGNOSIS — K90.41 NON-CELIAC GLUTEN SENSITIVITY: ICD-10-CM

## 2021-04-12 DIAGNOSIS — K20.0 EOSINOPHILIC ESOPHAGITIS: ICD-10-CM

## 2021-04-12 DIAGNOSIS — D80.4 IGM DEFICIENCY: ICD-10-CM

## 2021-04-12 DIAGNOSIS — M05.711 RHEUMATOID ARTHRITIS INVOLVING RIGHT SHOULDER WITH POSITIVE RHEUMATOID FACTOR: Primary | ICD-10-CM

## 2021-04-12 DIAGNOSIS — M79.641 HAND PAIN, RIGHT: ICD-10-CM

## 2021-04-12 DIAGNOSIS — F40.231 SEVERE NEEDLE PHOBIA: ICD-10-CM

## 2021-04-12 PROCEDURE — 1159F MED LIST DOCD IN RCRD: CPT | Mod: S$GLB,,, | Performed by: INTERNAL MEDICINE

## 2021-04-12 PROCEDURE — 99999 PR PBB SHADOW E&M-EST. PATIENT-LVL III: ICD-10-PCS | Mod: PBBFAC,,, | Performed by: INTERNAL MEDICINE

## 2021-04-12 PROCEDURE — 3288F PR FALLS RISK ASSESSMENT DOCUMENTED: ICD-10-PCS | Mod: CPTII,S$GLB,, | Performed by: INTERNAL MEDICINE

## 2021-04-12 PROCEDURE — 1125F AMNT PAIN NOTED PAIN PRSNT: CPT | Mod: S$GLB,,, | Performed by: INTERNAL MEDICINE

## 2021-04-12 PROCEDURE — 1101F PR PT FALLS ASSESS DOC 0-1 FALLS W/OUT INJ PAST YR: ICD-10-PCS | Mod: CPTII,S$GLB,, | Performed by: INTERNAL MEDICINE

## 2021-04-12 PROCEDURE — 1101F PT FALLS ASSESS-DOCD LE1/YR: CPT | Mod: CPTII,S$GLB,, | Performed by: INTERNAL MEDICINE

## 2021-04-12 PROCEDURE — 99214 PR OFFICE/OUTPT VISIT, EST, LEVL IV, 30-39 MIN: ICD-10-PCS | Mod: S$GLB,,, | Performed by: INTERNAL MEDICINE

## 2021-04-12 PROCEDURE — 1125F PR PAIN SEVERITY QUANTIFIED, PAIN PRESENT: ICD-10-PCS | Mod: S$GLB,,, | Performed by: INTERNAL MEDICINE

## 2021-04-12 PROCEDURE — 1159F PR MEDICATION LIST DOCUMENTED IN MEDICAL RECORD: ICD-10-PCS | Mod: S$GLB,,, | Performed by: INTERNAL MEDICINE

## 2021-04-12 PROCEDURE — 3288F FALL RISK ASSESSMENT DOCD: CPT | Mod: CPTII,S$GLB,, | Performed by: INTERNAL MEDICINE

## 2021-04-12 PROCEDURE — 99999 PR PBB SHADOW E&M-EST. PATIENT-LVL III: CPT | Mod: PBBFAC,,, | Performed by: INTERNAL MEDICINE

## 2021-04-12 PROCEDURE — 99214 OFFICE O/P EST MOD 30 MIN: CPT | Mod: S$GLB,,, | Performed by: INTERNAL MEDICINE

## 2021-04-12 RX ORDER — UPADACITINIB 15 MG/1
15 TABLET, EXTENDED RELEASE ORAL DAILY
Qty: 30 TABLET | Refills: 12 | Status: SHIPPED | OUTPATIENT
Start: 2021-04-12 | End: 2021-05-05

## 2021-04-12 RX ORDER — OMEGA-3-ACID ETHYL ESTERS 1 G/1
2 CAPSULE, LIQUID FILLED ORAL 2 TIMES DAILY
Qty: 360 CAPSULE | Refills: 3 | Status: SHIPPED | OUTPATIENT
Start: 2021-04-12 | End: 2022-08-27 | Stop reason: CLARIF

## 2021-04-12 ASSESSMENT — ROUTINE ASSESSMENT OF PATIENT INDEX DATA (RAPID3)
PSYCHOLOGICAL DISTRESS SCORE: 2.2
TOTAL RAPID3 SCORE: 6
PAIN SCORE: 7
PATIENT GLOBAL ASSESSMENT SCORE: 7
MDHAQ FUNCTION SCORE: 1.2
FATIGUE SCORE: 1.1

## 2021-04-27 RX ORDER — LEVOTHYROXINE SODIUM 50 UG/1
50 TABLET ORAL
Qty: 30 TABLET | Refills: 11 | Status: SHIPPED | OUTPATIENT
Start: 2021-04-27 | End: 2021-04-29 | Stop reason: SDUPTHER

## 2021-04-29 RX ORDER — LEVOTHYROXINE SODIUM 50 UG/1
50 TABLET ORAL
Qty: 90 TABLET | Refills: 3 | Status: SHIPPED | OUTPATIENT
Start: 2021-04-29 | End: 2021-12-03

## 2021-04-30 DIAGNOSIS — M35.1 MCTD (MIXED CONNECTIVE TISSUE DISEASE): ICD-10-CM

## 2021-04-30 RX ORDER — NAPROXEN 375 MG/1
375 TABLET ORAL 2 TIMES DAILY WITH MEALS
Qty: 180 TABLET | Refills: 1 | Status: SHIPPED | OUTPATIENT
Start: 2021-04-30 | End: 2021-09-14 | Stop reason: SDUPTHER

## 2021-04-30 RX ORDER — METHOCARBAMOL 750 MG/1
750 TABLET, FILM COATED ORAL 2 TIMES DAILY PRN
Qty: 180 TABLET | Refills: 1 | Status: SHIPPED | OUTPATIENT
Start: 2021-04-30 | End: 2021-08-16 | Stop reason: SDUPTHER

## 2021-04-30 RX ORDER — PREDNISONE 2.5 MG/1
5 TABLET ORAL DAILY PRN
Qty: 180 TABLET | Refills: 0 | Status: SHIPPED | OUTPATIENT
Start: 2021-04-30 | End: 2021-10-07

## 2021-05-03 ENCOUNTER — HOSPITAL ENCOUNTER (OUTPATIENT)
Dept: RADIOLOGY | Facility: HOSPITAL | Age: 76
Discharge: HOME | End: 2021-05-03
Attending: ORTHOPAEDIC SURGERY
Payer: MEDICARE

## 2021-05-03 ENCOUNTER — OFFICE VISIT (OUTPATIENT)
Dept: OTOLARYNGOLOGY | Facility: CLINIC | Age: 76
End: 2021-05-03
Payer: MEDICARE

## 2021-05-03 ENCOUNTER — OFFICE VISIT (OUTPATIENT)
Dept: ORTHOPEDICS | Facility: CLINIC | Age: 76
End: 2021-05-03
Payer: MEDICARE

## 2021-05-03 VITALS
BODY MASS INDEX: 26.87 KG/M2 | HEART RATE: 92 BPM | HEIGHT: 62 IN | DIASTOLIC BLOOD PRESSURE: 77 MMHG | SYSTOLIC BLOOD PRESSURE: 146 MMHG | WEIGHT: 146 LBS

## 2021-05-03 VITALS — HEIGHT: 62 IN | BODY MASS INDEX: 26.86 KG/M2 | WEIGHT: 145.94 LBS

## 2021-05-03 DIAGNOSIS — M65.332 TRIGGER FINGER, LEFT MIDDLE FINGER: ICD-10-CM

## 2021-05-03 DIAGNOSIS — M65.331 TRIGGER MIDDLE FINGER OF RIGHT HAND: ICD-10-CM

## 2021-05-03 DIAGNOSIS — H92.01 REFERRED OTALGIA OF RIGHT EAR: ICD-10-CM

## 2021-05-03 DIAGNOSIS — J32.0 RIGHT MAXILLARY SINUSITIS: Primary | ICD-10-CM

## 2021-05-03 DIAGNOSIS — M05.742 RHEUMATOID ARTHRITIS INVOLVING BOTH HANDS WITH POSITIVE RHEUMATOID FACTOR: ICD-10-CM

## 2021-05-03 DIAGNOSIS — M05.741 RHEUMATOID ARTHRITIS INVOLVING BOTH HANDS WITH POSITIVE RHEUMATOID FACTOR: ICD-10-CM

## 2021-05-03 DIAGNOSIS — R51.9 RIGHT SIDED FACIAL PAIN: ICD-10-CM

## 2021-05-03 DIAGNOSIS — M05.742 RHEUMATOID ARTHRITIS INVOLVING BOTH HANDS WITH POSITIVE RHEUMATOID FACTOR: Primary | ICD-10-CM

## 2021-05-03 DIAGNOSIS — M05.741 RHEUMATOID ARTHRITIS INVOLVING BOTH HANDS WITH POSITIVE RHEUMATOID FACTOR: Primary | ICD-10-CM

## 2021-05-03 DIAGNOSIS — M79.641 HAND PAIN, RIGHT: ICD-10-CM

## 2021-05-03 PROCEDURE — 1125F AMNT PAIN NOTED PAIN PRSNT: CPT | Mod: S$GLB,,, | Performed by: ORTHOPAEDIC SURGERY

## 2021-05-03 PROCEDURE — 99999 PR PBB SHADOW E&M-EST. PATIENT-LVL IV: ICD-10-PCS | Mod: PBBFAC,,, | Performed by: ORTHOPAEDIC SURGERY

## 2021-05-03 PROCEDURE — 1159F MED LIST DOCD IN RCRD: CPT | Mod: S$GLB,,, | Performed by: ORTHOPAEDIC SURGERY

## 2021-05-03 PROCEDURE — 1101F PT FALLS ASSESS-DOCD LE1/YR: CPT | Mod: CPTII,S$GLB,, | Performed by: ORTHOPAEDIC SURGERY

## 2021-05-03 PROCEDURE — 99214 OFFICE O/P EST MOD 30 MIN: CPT | Mod: S$GLB,,, | Performed by: NURSE PRACTITIONER

## 2021-05-03 PROCEDURE — 99214 PR OFFICE/OUTPT VISIT, EST, LEVL IV, 30-39 MIN: ICD-10-PCS | Mod: S$GLB,,, | Performed by: NURSE PRACTITIONER

## 2021-05-03 PROCEDURE — 99999 PR PBB SHADOW E&M-EST. PATIENT-LVL IV: CPT | Mod: PBBFAC,,, | Performed by: ORTHOPAEDIC SURGERY

## 2021-05-03 PROCEDURE — 20550 NJX 1 TENDON SHEATH/LIGAMENT: CPT | Mod: 50,S$GLB,, | Performed by: ORTHOPAEDIC SURGERY

## 2021-05-03 PROCEDURE — 3288F PR FALLS RISK ASSESSMENT DOCUMENTED: ICD-10-PCS | Mod: CPTII,S$GLB,, | Performed by: ORTHOPAEDIC SURGERY

## 2021-05-03 PROCEDURE — 73130 X-RAY EXAM OF HAND: CPT | Mod: TC,50,PO

## 2021-05-03 PROCEDURE — 99203 OFFICE O/P NEW LOW 30 MIN: CPT | Mod: 25,S$GLB,, | Performed by: ORTHOPAEDIC SURGERY

## 2021-05-03 PROCEDURE — 3288F FALL RISK ASSESSMENT DOCD: CPT | Mod: CPTII,S$GLB,, | Performed by: NURSE PRACTITIONER

## 2021-05-03 PROCEDURE — 1125F AMNT PAIN NOTED PAIN PRSNT: CPT | Mod: S$GLB,,, | Performed by: NURSE PRACTITIONER

## 2021-05-03 PROCEDURE — 99999 PR PBB SHADOW E&M-EST. PATIENT-LVL IV: CPT | Mod: PBBFAC,,, | Performed by: NURSE PRACTITIONER

## 2021-05-03 PROCEDURE — 1101F PR PT FALLS ASSESS DOC 0-1 FALLS W/OUT INJ PAST YR: ICD-10-PCS | Mod: CPTII,S$GLB,, | Performed by: ORTHOPAEDIC SURGERY

## 2021-05-03 PROCEDURE — 3288F FALL RISK ASSESSMENT DOCD: CPT | Mod: CPTII,S$GLB,, | Performed by: ORTHOPAEDIC SURGERY

## 2021-05-03 PROCEDURE — 20550 TENDON SHEATH: ICD-10-PCS | Mod: 50,S$GLB,, | Performed by: ORTHOPAEDIC SURGERY

## 2021-05-03 PROCEDURE — 1125F PR PAIN SEVERITY QUANTIFIED, PAIN PRESENT: ICD-10-PCS | Mod: S$GLB,,, | Performed by: NURSE PRACTITIONER

## 2021-05-03 PROCEDURE — 99999 PR PBB SHADOW E&M-EST. PATIENT-LVL IV: ICD-10-PCS | Mod: PBBFAC,,, | Performed by: NURSE PRACTITIONER

## 2021-05-03 PROCEDURE — 1159F PR MEDICATION LIST DOCUMENTED IN MEDICAL RECORD: ICD-10-PCS | Mod: S$GLB,,, | Performed by: ORTHOPAEDIC SURGERY

## 2021-05-03 PROCEDURE — 3288F PR FALLS RISK ASSESSMENT DOCUMENTED: ICD-10-PCS | Mod: CPTII,S$GLB,, | Performed by: NURSE PRACTITIONER

## 2021-05-03 PROCEDURE — 99203 PR OFFICE/OUTPT VISIT, NEW, LEVL III, 30-44 MIN: ICD-10-PCS | Mod: 25,S$GLB,, | Performed by: ORTHOPAEDIC SURGERY

## 2021-05-03 PROCEDURE — 1159F MED LIST DOCD IN RCRD: CPT | Mod: S$GLB,,, | Performed by: NURSE PRACTITIONER

## 2021-05-03 PROCEDURE — 1159F PR MEDICATION LIST DOCUMENTED IN MEDICAL RECORD: ICD-10-PCS | Mod: S$GLB,,, | Performed by: NURSE PRACTITIONER

## 2021-05-03 PROCEDURE — 73130 X-RAY EXAM OF HAND: CPT | Mod: 26,50,, | Performed by: RADIOLOGY

## 2021-05-03 PROCEDURE — 1125F PR PAIN SEVERITY QUANTIFIED, PAIN PRESENT: ICD-10-PCS | Mod: S$GLB,,, | Performed by: ORTHOPAEDIC SURGERY

## 2021-05-03 PROCEDURE — 1101F PR PT FALLS ASSESS DOC 0-1 FALLS W/OUT INJ PAST YR: ICD-10-PCS | Mod: CPTII,S$GLB,, | Performed by: NURSE PRACTITIONER

## 2021-05-03 PROCEDURE — 1101F PT FALLS ASSESS-DOCD LE1/YR: CPT | Mod: CPTII,S$GLB,, | Performed by: NURSE PRACTITIONER

## 2021-05-03 PROCEDURE — 73130 XR HAND COMPLETE 3 VIEWS BILATERAL: ICD-10-PCS | Mod: 26,50,, | Performed by: RADIOLOGY

## 2021-05-03 RX ORDER — TRIAMCINOLONE ACETONIDE 40 MG/ML
40 INJECTION, SUSPENSION INTRA-ARTICULAR; INTRAMUSCULAR
Status: DISCONTINUED | OUTPATIENT
Start: 2021-05-03 | End: 2021-05-03 | Stop reason: HOSPADM

## 2021-05-03 RX ORDER — CEFDINIR 300 MG/1
600 CAPSULE ORAL DAILY
Qty: 20 CAPSULE | Refills: 0 | Status: SHIPPED | OUTPATIENT
Start: 2021-05-03 | End: 2021-05-13

## 2021-05-03 RX ADMIN — TRIAMCINOLONE ACETONIDE 40 MG: 40 INJECTION, SUSPENSION INTRA-ARTICULAR; INTRAMUSCULAR at 08:05

## 2021-06-14 ENCOUNTER — LAB VISIT (OUTPATIENT)
Dept: LAB | Facility: HOSPITAL | Age: 76
End: 2021-06-14
Attending: INTERNAL MEDICINE
Payer: MEDICARE

## 2021-06-14 DIAGNOSIS — K90.41 NON-CELIAC GLUTEN SENSITIVITY: ICD-10-CM

## 2021-06-14 DIAGNOSIS — M05.711 RHEUMATOID ARTHRITIS INVOLVING RIGHT SHOULDER WITH POSITIVE RHEUMATOID FACTOR: ICD-10-CM

## 2021-06-14 DIAGNOSIS — K20.0 EOSINOPHILIC ESOPHAGITIS: ICD-10-CM

## 2021-06-14 DIAGNOSIS — M35.1 MCTD (MIXED CONNECTIVE TISSUE DISEASE): ICD-10-CM

## 2021-06-14 DIAGNOSIS — F40.231 SEVERE NEEDLE PHOBIA: ICD-10-CM

## 2021-06-14 DIAGNOSIS — D80.4 IGM DEFICIENCY: ICD-10-CM

## 2021-06-14 LAB
ALBUMIN SERPL BCP-MCNC: 4 G/DL (ref 3.5–5.2)
ALP SERPL-CCNC: 102 U/L (ref 55–135)
ALT SERPL W/O P-5'-P-CCNC: 31 U/L (ref 10–44)
ANION GAP SERPL CALC-SCNC: 10 MMOL/L (ref 8–16)
AST SERPL-CCNC: 27 U/L (ref 10–40)
BASOPHILS # BLD AUTO: 0.04 K/UL (ref 0–0.2)
BASOPHILS NFR BLD: 0.4 % (ref 0–1.9)
BILIRUB SERPL-MCNC: 1 MG/DL (ref 0.1–1)
BUN SERPL-MCNC: 22 MG/DL (ref 8–23)
CALCIUM SERPL-MCNC: 10.3 MG/DL (ref 8.7–10.5)
CHLORIDE SERPL-SCNC: 106 MMOL/L (ref 95–110)
CHOLEST SERPL-MCNC: 242 MG/DL (ref 120–199)
CHOLEST/HDLC SERPL: 3.1 {RATIO} (ref 2–5)
CO2 SERPL-SCNC: 23 MMOL/L (ref 23–29)
CREAT SERPL-MCNC: 0.8 MG/DL (ref 0.5–1.4)
CRP SERPL-MCNC: 2.7 MG/L (ref 0–8.2)
DIFFERENTIAL METHOD: ABNORMAL
EOSINOPHIL # BLD AUTO: 0.3 K/UL (ref 0–0.5)
EOSINOPHIL NFR BLD: 2.8 % (ref 0–8)
ERYTHROCYTE [DISTWIDTH] IN BLOOD BY AUTOMATED COUNT: 14.5 % (ref 11.5–14.5)
ERYTHROCYTE [SEDIMENTATION RATE] IN BLOOD BY WESTERGREN METHOD: 2 MM/HR (ref 0–20)
EST. GFR  (AFRICAN AMERICAN): >60 ML/MIN/1.73 M^2
EST. GFR  (NON AFRICAN AMERICAN): >60 ML/MIN/1.73 M^2
FERRITIN SERPL-MCNC: 119 NG/ML (ref 20–300)
GLUCOSE SERPL-MCNC: 105 MG/DL (ref 70–110)
HCT VFR BLD AUTO: 42.8 % (ref 37–48.5)
HDLC SERPL-MCNC: 77 MG/DL (ref 40–75)
HDLC SERPL: 31.8 % (ref 20–50)
HGB BLD-MCNC: 13.4 G/DL (ref 12–16)
IMM GRANULOCYTES # BLD AUTO: 0.03 K/UL (ref 0–0.04)
IMM GRANULOCYTES NFR BLD AUTO: 0.3 % (ref 0–0.5)
LDLC SERPL CALC-MCNC: 129.4 MG/DL (ref 63–159)
LYMPHOCYTES # BLD AUTO: 3.6 K/UL (ref 1–4.8)
LYMPHOCYTES NFR BLD: 35.7 % (ref 18–48)
MCH RBC QN AUTO: 28.8 PG (ref 27–31)
MCHC RBC AUTO-ENTMCNC: 31.3 G/DL (ref 32–36)
MCV RBC AUTO: 92 FL (ref 82–98)
MONOCYTES # BLD AUTO: 0.7 K/UL (ref 0.3–1)
MONOCYTES NFR BLD: 6.5 % (ref 4–15)
NEUTROPHILS # BLD AUTO: 5.5 K/UL (ref 1.8–7.7)
NEUTROPHILS NFR BLD: 54.3 % (ref 38–73)
NONHDLC SERPL-MCNC: 165 MG/DL
NRBC BLD-RTO: 0 /100 WBC
PLATELET # BLD AUTO: 307 K/UL (ref 150–450)
PMV BLD AUTO: 9.2 FL (ref 9.2–12.9)
POTASSIUM SERPL-SCNC: 4.2 MMOL/L (ref 3.5–5.1)
PROT SERPL-MCNC: 7 G/DL (ref 6–8.4)
RBC # BLD AUTO: 4.65 M/UL (ref 4–5.4)
SODIUM SERPL-SCNC: 139 MMOL/L (ref 136–145)
TRIGL SERPL-MCNC: 178 MG/DL (ref 30–150)
WBC # BLD AUTO: 10.07 K/UL (ref 3.9–12.7)

## 2021-06-14 PROCEDURE — 85651 RBC SED RATE NONAUTOMATED: CPT | Mod: PO | Performed by: INTERNAL MEDICINE

## 2021-06-14 PROCEDURE — 82728 ASSAY OF FERRITIN: CPT | Performed by: INTERNAL MEDICINE

## 2021-06-14 PROCEDURE — 36415 COLL VENOUS BLD VENIPUNCTURE: CPT | Mod: PO | Performed by: INTERNAL MEDICINE

## 2021-06-14 PROCEDURE — 80061 LIPID PANEL: CPT | Performed by: INTERNAL MEDICINE

## 2021-06-14 PROCEDURE — 86140 C-REACTIVE PROTEIN: CPT | Performed by: INTERNAL MEDICINE

## 2021-06-14 PROCEDURE — 85025 COMPLETE CBC W/AUTO DIFF WBC: CPT | Performed by: INTERNAL MEDICINE

## 2021-06-14 PROCEDURE — 80053 COMPREHEN METABOLIC PANEL: CPT | Performed by: INTERNAL MEDICINE

## 2021-07-27 ENCOUNTER — OFFICE VISIT (OUTPATIENT)
Dept: URGENT CARE | Facility: CLINIC | Age: 76
End: 2021-07-27
Payer: MEDICARE

## 2021-07-27 VITALS
HEART RATE: 108 BPM | WEIGHT: 153 LBS | DIASTOLIC BLOOD PRESSURE: 72 MMHG | OXYGEN SATURATION: 94 % | HEIGHT: 62 IN | SYSTOLIC BLOOD PRESSURE: 132 MMHG | BODY MASS INDEX: 28.16 KG/M2 | TEMPERATURE: 98 F | RESPIRATION RATE: 16 BRPM

## 2021-07-27 DIAGNOSIS — T78.1XXA ALLERGIC REACTION TO FOOD, INITIAL ENCOUNTER: Primary | ICD-10-CM

## 2021-07-27 PROCEDURE — 96372 THER/PROPH/DIAG INJ SC/IM: CPT | Mod: S$GLB,,, | Performed by: FAMILY MEDICINE

## 2021-07-27 PROCEDURE — 3075F PR MOST RECENT SYSTOLIC BLOOD PRESS GE 130-139MM HG: ICD-10-PCS | Mod: CPTII,S$GLB,, | Performed by: PHYSICIAN ASSISTANT

## 2021-07-27 PROCEDURE — 1160F PR REVIEW ALL MEDS BY PRESCRIBER/CLIN PHARMACIST DOCUMENTED: ICD-10-PCS | Mod: CPTII,S$GLB,, | Performed by: PHYSICIAN ASSISTANT

## 2021-07-27 PROCEDURE — 99214 PR OFFICE/OUTPT VISIT, EST, LEVL IV, 30-39 MIN: ICD-10-PCS | Mod: 25,S$GLB,, | Performed by: PHYSICIAN ASSISTANT

## 2021-07-27 PROCEDURE — 3078F DIAST BP <80 MM HG: CPT | Mod: CPTII,S$GLB,, | Performed by: PHYSICIAN ASSISTANT

## 2021-07-27 PROCEDURE — 1159F PR MEDICATION LIST DOCUMENTED IN MEDICAL RECORD: ICD-10-PCS | Mod: CPTII,S$GLB,, | Performed by: PHYSICIAN ASSISTANT

## 2021-07-27 PROCEDURE — 3075F SYST BP GE 130 - 139MM HG: CPT | Mod: CPTII,S$GLB,, | Performed by: PHYSICIAN ASSISTANT

## 2021-07-27 PROCEDURE — 96372 PR INJECTION,THERAP/PROPH/DIAG2ST, IM OR SUBCUT: ICD-10-PCS | Mod: S$GLB,,, | Performed by: FAMILY MEDICINE

## 2021-07-27 PROCEDURE — 3078F PR MOST RECENT DIASTOLIC BLOOD PRESSURE < 80 MM HG: ICD-10-PCS | Mod: CPTII,S$GLB,, | Performed by: PHYSICIAN ASSISTANT

## 2021-07-27 PROCEDURE — 1159F MED LIST DOCD IN RCRD: CPT | Mod: CPTII,S$GLB,, | Performed by: PHYSICIAN ASSISTANT

## 2021-07-27 PROCEDURE — 1160F RVW MEDS BY RX/DR IN RCRD: CPT | Mod: CPTII,S$GLB,, | Performed by: PHYSICIAN ASSISTANT

## 2021-07-27 PROCEDURE — 99214 OFFICE O/P EST MOD 30 MIN: CPT | Mod: 25,S$GLB,, | Performed by: PHYSICIAN ASSISTANT

## 2021-07-27 RX ORDER — LORATADINE 10 MG/1
10 TABLET ORAL DAILY
Qty: 30 TABLET | Refills: 3 | Status: SHIPPED | OUTPATIENT
Start: 2021-07-27 | End: 2022-10-07

## 2021-07-27 RX ORDER — HYDROXYZINE HYDROCHLORIDE 25 MG/1
25 TABLET, FILM COATED ORAL 3 TIMES DAILY PRN
Qty: 30 TABLET | Refills: 0 | Status: SHIPPED | OUTPATIENT
Start: 2021-07-27 | End: 2021-10-07 | Stop reason: ALTCHOICE

## 2021-07-27 RX ORDER — HYDROCORTISONE 25 MG/G
CREAM TOPICAL 2 TIMES DAILY
Qty: 28 G | Refills: 0 | Status: SHIPPED | OUTPATIENT
Start: 2021-07-27 | End: 2021-10-07 | Stop reason: ALTCHOICE

## 2021-07-27 RX ORDER — ALBUTEROL SULFATE 90 UG/1
2 AEROSOL, METERED RESPIRATORY (INHALATION) EVERY 6 HOURS PRN
Qty: 18 G | Refills: 3 | Status: SHIPPED | OUTPATIENT
Start: 2021-07-27 | End: 2022-05-12

## 2021-08-05 ENCOUNTER — PATIENT MESSAGE (OUTPATIENT)
Dept: RHEUMATOLOGY | Facility: CLINIC | Age: 76
End: 2021-08-05

## 2021-08-16 ENCOUNTER — LAB VISIT (OUTPATIENT)
Dept: LAB | Facility: HOSPITAL | Age: 76
End: 2021-08-16
Attending: PHYSICIAN ASSISTANT
Payer: MEDICARE

## 2021-08-16 ENCOUNTER — OFFICE VISIT (OUTPATIENT)
Dept: RHEUMATOLOGY | Facility: CLINIC | Age: 76
End: 2021-08-16
Payer: MEDICARE

## 2021-08-16 VITALS
SYSTOLIC BLOOD PRESSURE: 136 MMHG | DIASTOLIC BLOOD PRESSURE: 78 MMHG | WEIGHT: 158 LBS | HEIGHT: 62 IN | HEART RATE: 94 BPM | BODY MASS INDEX: 29.08 KG/M2

## 2021-08-16 DIAGNOSIS — M15.9 PRIMARY OSTEOARTHRITIS INVOLVING MULTIPLE JOINTS: ICD-10-CM

## 2021-08-16 DIAGNOSIS — M06.00 SERONEGATIVE RHEUMATOID ARTHRITIS: ICD-10-CM

## 2021-08-16 DIAGNOSIS — G47.00 INSOMNIA, UNSPECIFIED TYPE: ICD-10-CM

## 2021-08-16 DIAGNOSIS — E21.3 HYPERPARATHYROIDISM: ICD-10-CM

## 2021-08-16 DIAGNOSIS — M35.1 MCTD (MIXED CONNECTIVE TISSUE DISEASE): ICD-10-CM

## 2021-08-16 DIAGNOSIS — E06.3 HASHIMOTO'S THYROIDITIS: ICD-10-CM

## 2021-08-16 DIAGNOSIS — M35.1 MCTD (MIXED CONNECTIVE TISSUE DISEASE): Primary | ICD-10-CM

## 2021-08-16 LAB
CRP SERPL-MCNC: 16.5 MG/L (ref 0–8.2)
ERYTHROCYTE [SEDIMENTATION RATE] IN BLOOD BY WESTERGREN METHOD: 13 MM/HR (ref 0–20)
PTH-INTACT SERPL-MCNC: 80.8 PG/ML (ref 9–77)
T3FREE SERPL-MCNC: 2.9 PG/ML (ref 2.3–4.2)
T4 FREE SERPL-MCNC: 0.91 NG/DL (ref 0.71–1.51)
THYROGLOB AB SERPL IA-ACNC: <4 IU/ML (ref 0–3.9)
THYROPEROXIDASE IGG SERPL-ACNC: <6 IU/ML
TSH SERPL DL<=0.005 MIU/L-ACNC: 1.49 UIU/ML (ref 0.4–4)

## 2021-08-16 PROCEDURE — 96372 PR INJECTION,THERAP/PROPH/DIAG2ST, IM OR SUBCUT: ICD-10-PCS | Mod: S$GLB,,, | Performed by: PHYSICIAN ASSISTANT

## 2021-08-16 PROCEDURE — 3288F FALL RISK ASSESSMENT DOCD: CPT | Mod: CPTII,S$GLB,, | Performed by: PHYSICIAN ASSISTANT

## 2021-08-16 PROCEDURE — 86376 MICROSOMAL ANTIBODY EACH: CPT | Performed by: PHYSICIAN ASSISTANT

## 2021-08-16 PROCEDURE — 1160F RVW MEDS BY RX/DR IN RCRD: CPT | Mod: CPTII,S$GLB,, | Performed by: PHYSICIAN ASSISTANT

## 2021-08-16 PROCEDURE — 3078F DIAST BP <80 MM HG: CPT | Mod: CPTII,S$GLB,, | Performed by: PHYSICIAN ASSISTANT

## 2021-08-16 PROCEDURE — 99999 PR PBB SHADOW E&M-EST. PATIENT-LVL IV: CPT | Mod: PBBFAC,,, | Performed by: PHYSICIAN ASSISTANT

## 2021-08-16 PROCEDURE — 1125F PR PAIN SEVERITY QUANTIFIED, PAIN PRESENT: ICD-10-PCS | Mod: CPTII,S$GLB,, | Performed by: PHYSICIAN ASSISTANT

## 2021-08-16 PROCEDURE — 1159F PR MEDICATION LIST DOCUMENTED IN MEDICAL RECORD: ICD-10-PCS | Mod: CPTII,S$GLB,, | Performed by: PHYSICIAN ASSISTANT

## 2021-08-16 PROCEDURE — 3075F PR MOST RECENT SYSTOLIC BLOOD PRESS GE 130-139MM HG: ICD-10-PCS | Mod: CPTII,S$GLB,, | Performed by: PHYSICIAN ASSISTANT

## 2021-08-16 PROCEDURE — 86800 THYROGLOBULIN ANTIBODY: CPT | Performed by: PHYSICIAN ASSISTANT

## 2021-08-16 PROCEDURE — 84439 ASSAY OF FREE THYROXINE: CPT | Performed by: PHYSICIAN ASSISTANT

## 2021-08-16 PROCEDURE — 84443 ASSAY THYROID STIM HORMONE: CPT | Performed by: PHYSICIAN ASSISTANT

## 2021-08-16 PROCEDURE — 83970 ASSAY OF PARATHORMONE: CPT | Performed by: PHYSICIAN ASSISTANT

## 2021-08-16 PROCEDURE — 1160F PR REVIEW ALL MEDS BY PRESCRIBER/CLIN PHARMACIST DOCUMENTED: ICD-10-PCS | Mod: CPTII,S$GLB,, | Performed by: PHYSICIAN ASSISTANT

## 2021-08-16 PROCEDURE — 86140 C-REACTIVE PROTEIN: CPT | Performed by: PHYSICIAN ASSISTANT

## 2021-08-16 PROCEDURE — 96372 THER/PROPH/DIAG INJ SC/IM: CPT | Mod: S$GLB,,, | Performed by: PHYSICIAN ASSISTANT

## 2021-08-16 PROCEDURE — 99214 PR OFFICE/OUTPT VISIT, EST, LEVL IV, 30-39 MIN: ICD-10-PCS | Mod: 25,S$GLB,, | Performed by: PHYSICIAN ASSISTANT

## 2021-08-16 PROCEDURE — 3078F PR MOST RECENT DIASTOLIC BLOOD PRESSURE < 80 MM HG: ICD-10-PCS | Mod: CPTII,S$GLB,, | Performed by: PHYSICIAN ASSISTANT

## 2021-08-16 PROCEDURE — 85651 RBC SED RATE NONAUTOMATED: CPT | Mod: PO | Performed by: PHYSICIAN ASSISTANT

## 2021-08-16 PROCEDURE — 36415 COLL VENOUS BLD VENIPUNCTURE: CPT | Mod: PO | Performed by: PHYSICIAN ASSISTANT

## 2021-08-16 PROCEDURE — 1159F MED LIST DOCD IN RCRD: CPT | Mod: CPTII,S$GLB,, | Performed by: PHYSICIAN ASSISTANT

## 2021-08-16 PROCEDURE — 1125F AMNT PAIN NOTED PAIN PRSNT: CPT | Mod: CPTII,S$GLB,, | Performed by: PHYSICIAN ASSISTANT

## 2021-08-16 PROCEDURE — 1101F PR PT FALLS ASSESS DOC 0-1 FALLS W/OUT INJ PAST YR: ICD-10-PCS | Mod: CPTII,S$GLB,, | Performed by: PHYSICIAN ASSISTANT

## 2021-08-16 PROCEDURE — 99214 OFFICE O/P EST MOD 30 MIN: CPT | Mod: 25,S$GLB,, | Performed by: PHYSICIAN ASSISTANT

## 2021-08-16 PROCEDURE — 3075F SYST BP GE 130 - 139MM HG: CPT | Mod: CPTII,S$GLB,, | Performed by: PHYSICIAN ASSISTANT

## 2021-08-16 PROCEDURE — 84481 FREE ASSAY (FT-3): CPT | Performed by: PHYSICIAN ASSISTANT

## 2021-08-16 PROCEDURE — 3288F PR FALLS RISK ASSESSMENT DOCUMENTED: ICD-10-PCS | Mod: CPTII,S$GLB,, | Performed by: PHYSICIAN ASSISTANT

## 2021-08-16 PROCEDURE — 99999 PR PBB SHADOW E&M-EST. PATIENT-LVL IV: ICD-10-PCS | Mod: PBBFAC,,, | Performed by: PHYSICIAN ASSISTANT

## 2021-08-16 PROCEDURE — 1101F PT FALLS ASSESS-DOCD LE1/YR: CPT | Mod: CPTII,S$GLB,, | Performed by: PHYSICIAN ASSISTANT

## 2021-08-16 RX ORDER — CYANOCOBALAMIN 1000 UG/ML
1000 INJECTION, SOLUTION INTRAMUSCULAR; SUBCUTANEOUS
Status: COMPLETED | OUTPATIENT
Start: 2021-08-16 | End: 2021-08-16

## 2021-08-16 RX ORDER — TOFACITINIB 11 MG/1
11 TABLET, FILM COATED, EXTENDED RELEASE ORAL DAILY
Qty: 30 TABLET | Refills: 6 | OUTPATIENT
Start: 2021-08-16 | End: 2021-10-07

## 2021-08-16 RX ORDER — AMITRIPTYLINE HYDROCHLORIDE 10 MG/1
10 TABLET, FILM COATED ORAL NIGHTLY
Qty: 30 TABLET | Refills: 3 | Status: SHIPPED | OUTPATIENT
Start: 2021-08-16 | End: 2021-10-07

## 2021-08-16 RX ORDER — METHOCARBAMOL 750 MG/1
750 TABLET, FILM COATED ORAL 2 TIMES DAILY PRN
Qty: 180 TABLET | Refills: 1 | Status: SHIPPED | OUTPATIENT
Start: 2021-08-16 | End: 2022-04-18

## 2021-08-16 RX ORDER — KETOROLAC TROMETHAMINE 30 MG/ML
60 INJECTION, SOLUTION INTRAMUSCULAR; INTRAVENOUS
Status: COMPLETED | OUTPATIENT
Start: 2021-08-16 | End: 2021-08-16

## 2021-08-16 RX ADMIN — CYANOCOBALAMIN 1000 MCG: 1000 INJECTION, SOLUTION INTRAMUSCULAR; SUBCUTANEOUS at 10:08

## 2021-08-16 RX ADMIN — KETOROLAC TROMETHAMINE 60 MG: 30 INJECTION, SOLUTION INTRAMUSCULAR; INTRAVENOUS at 10:08

## 2021-08-16 ASSESSMENT — ROUTINE ASSESSMENT OF PATIENT INDEX DATA (RAPID3)
PSYCHOLOGICAL DISTRESS SCORE: 3.3
MDHAQ FUNCTION SCORE: 1.4
PATIENT GLOBAL ASSESSMENT SCORE: 9
PAIN SCORE: 10
FATIGUE SCORE: 2.2
TOTAL RAPID3 SCORE: 7.89

## 2021-08-17 ENCOUNTER — SPECIALTY PHARMACY (OUTPATIENT)
Dept: PHARMACY | Facility: CLINIC | Age: 76
End: 2021-08-17

## 2021-09-08 ENCOUNTER — TELEPHONE (OUTPATIENT)
Dept: RHEUMATOLOGY | Facility: CLINIC | Age: 76
End: 2021-09-08

## 2021-09-08 NOTE — TELEPHONE ENCOUNTER
----- Message from Jennifer Wilson sent at 8/25/2021  1:16 PM CDT -----  Gibran Meza and Staff,    I faxed over the provider portion of the patient's [employer] assistance application to [phone] on 8/20. Do you know if you received this? We have not received it back.    Thank you,  Jennifer Wilson  Patient Care Advocate  [facility]  Ph: [phone]

## 2021-09-14 DIAGNOSIS — M35.1 MCTD (MIXED CONNECTIVE TISSUE DISEASE): ICD-10-CM

## 2021-09-15 RX ORDER — NAPROXEN 375 MG/1
375 TABLET ORAL 2 TIMES DAILY WITH MEALS
Qty: 180 TABLET | Refills: 1 | Status: SHIPPED | OUTPATIENT
Start: 2021-09-15 | End: 2022-02-11 | Stop reason: SDUPTHER

## 2021-10-04 ENCOUNTER — PATIENT MESSAGE (OUTPATIENT)
Dept: RHEUMATOLOGY | Facility: CLINIC | Age: 76
End: 2021-10-04

## 2021-10-19 ENCOUNTER — PATIENT MESSAGE (OUTPATIENT)
Dept: RHEUMATOLOGY | Facility: CLINIC | Age: 76
End: 2021-10-19
Payer: MEDICARE

## 2021-10-28 PROBLEM — S30.0XXA TRAUMATIC HEMATOMA OF BUTTOCK: Chronic | Status: ACTIVE | Noted: 2021-10-28

## 2021-11-01 ENCOUNTER — OFFICE VISIT (OUTPATIENT)
Dept: ORTHOPEDICS | Facility: CLINIC | Age: 76
End: 2021-11-01
Payer: MEDICARE

## 2021-11-01 VITALS — HEIGHT: 62 IN | BODY MASS INDEX: 28.71 KG/M2 | WEIGHT: 156 LBS

## 2021-11-01 DIAGNOSIS — M25.511 RIGHT SHOULDER PAIN, UNSPECIFIED CHRONICITY: ICD-10-CM

## 2021-11-01 DIAGNOSIS — S46.011A TRAUMATIC TEAR OF RIGHT ROTATOR CUFF, UNSPECIFIED TEAR EXTENT, INITIAL ENCOUNTER: Primary | ICD-10-CM

## 2021-11-01 DIAGNOSIS — J45.20 MILD INTERMITTENT ASTHMA WITHOUT COMPLICATION: ICD-10-CM

## 2021-11-01 DIAGNOSIS — F41.8 SITUATIONAL ANXIETY: ICD-10-CM

## 2021-11-01 DIAGNOSIS — F32.9 REACTIVE DEPRESSION: ICD-10-CM

## 2021-11-01 DIAGNOSIS — S16.1XXD CERVICAL STRAIN, ACUTE, SUBSEQUENT ENCOUNTER: Primary | ICD-10-CM

## 2021-11-01 PROCEDURE — 1159F PR MEDICATION LIST DOCUMENTED IN MEDICAL RECORD: ICD-10-PCS | Mod: CPTII,S$GLB,, | Performed by: ORTHOPAEDIC SURGERY

## 2021-11-01 PROCEDURE — 1101F PR PT FALLS ASSESS DOC 0-1 FALLS W/OUT INJ PAST YR: ICD-10-PCS | Mod: CPTII,S$GLB,, | Performed by: ORTHOPAEDIC SURGERY

## 2021-11-01 PROCEDURE — 3288F PR FALLS RISK ASSESSMENT DOCUMENTED: ICD-10-PCS | Mod: CPTII,S$GLB,, | Performed by: ORTHOPAEDIC SURGERY

## 2021-11-01 PROCEDURE — 99204 OFFICE O/P NEW MOD 45 MIN: CPT | Mod: S$GLB,,, | Performed by: ORTHOPAEDIC SURGERY

## 2021-11-01 PROCEDURE — 1125F PR PAIN SEVERITY QUANTIFIED, PAIN PRESENT: ICD-10-PCS | Mod: CPTII,S$GLB,, | Performed by: ORTHOPAEDIC SURGERY

## 2021-11-01 PROCEDURE — 99204 PR OFFICE/OUTPT VISIT, NEW, LEVL IV, 45-59 MIN: ICD-10-PCS | Mod: S$GLB,,, | Performed by: ORTHOPAEDIC SURGERY

## 2021-11-01 PROCEDURE — 1160F PR REVIEW ALL MEDS BY PRESCRIBER/CLIN PHARMACIST DOCUMENTED: ICD-10-PCS | Mod: CPTII,S$GLB,, | Performed by: ORTHOPAEDIC SURGERY

## 2021-11-01 PROCEDURE — 1101F PT FALLS ASSESS-DOCD LE1/YR: CPT | Mod: CPTII,S$GLB,, | Performed by: ORTHOPAEDIC SURGERY

## 2021-11-01 PROCEDURE — 3288F FALL RISK ASSESSMENT DOCD: CPT | Mod: CPTII,S$GLB,, | Performed by: ORTHOPAEDIC SURGERY

## 2021-11-01 PROCEDURE — 99999 PR PBB SHADOW E&M-EST. PATIENT-LVL III: ICD-10-PCS | Mod: PBBFAC,,, | Performed by: ORTHOPAEDIC SURGERY

## 2021-11-01 PROCEDURE — 99999 PR PBB SHADOW E&M-EST. PATIENT-LVL III: CPT | Mod: PBBFAC,,, | Performed by: ORTHOPAEDIC SURGERY

## 2021-11-01 PROCEDURE — 1125F AMNT PAIN NOTED PAIN PRSNT: CPT | Mod: CPTII,S$GLB,, | Performed by: ORTHOPAEDIC SURGERY

## 2021-11-01 PROCEDURE — 1159F MED LIST DOCD IN RCRD: CPT | Mod: CPTII,S$GLB,, | Performed by: ORTHOPAEDIC SURGERY

## 2021-11-01 PROCEDURE — 1160F RVW MEDS BY RX/DR IN RCRD: CPT | Mod: CPTII,S$GLB,, | Performed by: ORTHOPAEDIC SURGERY

## 2021-12-27 ENCOUNTER — DOCUMENTATION ONLY (OUTPATIENT)
Dept: RHEUMATOLOGY | Facility: CLINIC | Age: 76
End: 2021-12-27
Payer: MEDICARE

## 2021-12-27 ENCOUNTER — OFFICE VISIT (OUTPATIENT)
Dept: RHEUMATOLOGY | Facility: CLINIC | Age: 76
End: 2021-12-27
Payer: MEDICARE

## 2021-12-27 VITALS
DIASTOLIC BLOOD PRESSURE: 81 MMHG | WEIGHT: 149 LBS | HEIGHT: 62 IN | BODY MASS INDEX: 27.42 KG/M2 | HEART RATE: 106 BPM | SYSTOLIC BLOOD PRESSURE: 156 MMHG

## 2021-12-27 DIAGNOSIS — M06.00 SERONEGATIVE RHEUMATOID ARTHRITIS: ICD-10-CM

## 2021-12-27 DIAGNOSIS — M05.711 RHEUMATOID ARTHRITIS INVOLVING RIGHT SHOULDER WITH POSITIVE RHEUMATOID FACTOR: ICD-10-CM

## 2021-12-27 DIAGNOSIS — E06.3 HASHIMOTO'S THYROIDITIS: ICD-10-CM

## 2021-12-27 DIAGNOSIS — M75.101 ROTATOR CUFF TEAR ARTHROPATHY, RIGHT: Primary | ICD-10-CM

## 2021-12-27 DIAGNOSIS — M35.1 MCTD (MIXED CONNECTIVE TISSUE DISEASE): ICD-10-CM

## 2021-12-27 DIAGNOSIS — M12.811 ROTATOR CUFF TEAR ARTHROPATHY, RIGHT: Primary | ICD-10-CM

## 2021-12-27 PROCEDURE — 1125F PR PAIN SEVERITY QUANTIFIED, PAIN PRESENT: ICD-10-PCS | Mod: CPTII,S$GLB,, | Performed by: INTERNAL MEDICINE

## 2021-12-27 PROCEDURE — 1159F MED LIST DOCD IN RCRD: CPT | Mod: CPTII,S$GLB,, | Performed by: INTERNAL MEDICINE

## 2021-12-27 PROCEDURE — 3077F SYST BP >= 140 MM HG: CPT | Mod: CPTII,S$GLB,, | Performed by: INTERNAL MEDICINE

## 2021-12-27 PROCEDURE — 3288F FALL RISK ASSESSMENT DOCD: CPT | Mod: CPTII,S$GLB,, | Performed by: INTERNAL MEDICINE

## 2021-12-27 PROCEDURE — 99215 OFFICE O/P EST HI 40 MIN: CPT | Mod: S$GLB,,, | Performed by: INTERNAL MEDICINE

## 2021-12-27 PROCEDURE — 3079F PR MOST RECENT DIASTOLIC BLOOD PRESSURE 80-89 MM HG: ICD-10-PCS | Mod: CPTII,S$GLB,, | Performed by: INTERNAL MEDICINE

## 2021-12-27 PROCEDURE — 1101F PR PT FALLS ASSESS DOC 0-1 FALLS W/OUT INJ PAST YR: ICD-10-PCS | Mod: CPTII,S$GLB,, | Performed by: INTERNAL MEDICINE

## 2021-12-27 PROCEDURE — 3288F PR FALLS RISK ASSESSMENT DOCUMENTED: ICD-10-PCS | Mod: CPTII,S$GLB,, | Performed by: INTERNAL MEDICINE

## 2021-12-27 PROCEDURE — 1101F PT FALLS ASSESS-DOCD LE1/YR: CPT | Mod: CPTII,S$GLB,, | Performed by: INTERNAL MEDICINE

## 2021-12-27 PROCEDURE — 99999 PR PBB SHADOW E&M-EST. PATIENT-LVL III: CPT | Mod: PBBFAC,,, | Performed by: INTERNAL MEDICINE

## 2021-12-27 PROCEDURE — 3077F PR MOST RECENT SYSTOLIC BLOOD PRESSURE >= 140 MM HG: ICD-10-PCS | Mod: CPTII,S$GLB,, | Performed by: INTERNAL MEDICINE

## 2021-12-27 PROCEDURE — 1125F AMNT PAIN NOTED PAIN PRSNT: CPT | Mod: CPTII,S$GLB,, | Performed by: INTERNAL MEDICINE

## 2021-12-27 PROCEDURE — 99215 PR OFFICE/OUTPT VISIT, EST, LEVL V, 40-54 MIN: ICD-10-PCS | Mod: S$GLB,,, | Performed by: INTERNAL MEDICINE

## 2021-12-27 PROCEDURE — 1159F PR MEDICATION LIST DOCUMENTED IN MEDICAL RECORD: ICD-10-PCS | Mod: CPTII,S$GLB,, | Performed by: INTERNAL MEDICINE

## 2021-12-27 PROCEDURE — 3079F DIAST BP 80-89 MM HG: CPT | Mod: CPTII,S$GLB,, | Performed by: INTERNAL MEDICINE

## 2021-12-27 PROCEDURE — 99999 PR PBB SHADOW E&M-EST. PATIENT-LVL III: ICD-10-PCS | Mod: PBBFAC,,, | Performed by: INTERNAL MEDICINE

## 2021-12-27 RX ORDER — EPINEPHRINE 0.3 MG/.3ML
1 INJECTION SUBCUTANEOUS ONCE
Qty: 0.3 ML | Refills: 1 | Status: SHIPPED | OUTPATIENT
Start: 2021-12-27 | End: 2022-01-22

## 2021-12-27 RX ORDER — PREDNISONE 2.5 MG/1
5 TABLET ORAL DAILY
Qty: 180 TABLET | Refills: 3 | Status: SHIPPED | OUTPATIENT
Start: 2021-12-27 | End: 2022-04-06

## 2021-12-27 RX ORDER — SARILUMAB 200 MG/1.14ML
200 INJECTION, SOLUTION SUBCUTANEOUS
Qty: 2 PEN | Refills: 11 | COMMUNITY
Start: 2021-12-27 | End: 2022-07-11

## 2021-12-27 RX ORDER — LIDOCAINE 50 MG/G
1 PATCH TOPICAL DAILY
Qty: 30 PATCH | Refills: 6 | Status: SHIPPED | OUTPATIENT
Start: 2021-12-27 | End: 2022-01-26

## 2021-12-27 RX ORDER — PREDNISONE 2.5 MG/1
5 TABLET ORAL DAILY
COMMUNITY
End: 2021-12-27 | Stop reason: SDUPTHER

## 2021-12-27 RX ORDER — THYROID 60 MG/1
60 TABLET ORAL
Qty: 30 TABLET | Refills: 11 | Status: SHIPPED | OUTPATIENT
Start: 2021-12-27 | End: 2022-03-22 | Stop reason: SDUPTHER

## 2021-12-27 ASSESSMENT — ROUTINE ASSESSMENT OF PATIENT INDEX DATA (RAPID3)
PAIN SCORE: 8
TOTAL RAPID3 SCORE: 6.83
MDHAQ FUNCTION SCORE: 1.5
FATIGUE SCORE: 3.3
PSYCHOLOGICAL DISTRESS SCORE: 4.4
PATIENT GLOBAL ASSESSMENT SCORE: 7.5

## 2022-01-12 ENCOUNTER — SPECIALTY PHARMACY (OUTPATIENT)
Dept: PHARMACY | Facility: CLINIC | Age: 77
End: 2022-01-12
Payer: MEDICARE

## 2022-01-18 NOTE — TELEPHONE ENCOUNTER
BENEFITS INVESTIGATION - KEVZARA [employer] Medicare Plan    [facility] in network  Patient is in the initial benefit stage:  $4135.92  Co-pay:  $1364.85  Rep:  Coreen    Forward to financial assistance for review

## 2022-02-11 DIAGNOSIS — M35.1 MCTD (MIXED CONNECTIVE TISSUE DISEASE): ICD-10-CM

## 2022-02-11 NOTE — TELEPHONE ENCOUNTER
Incoming call from patient who states that she has recovered from her surgery and is ready to discuss Kevzara. Informed pt that Ginger has been approved on her insurance with a high copay. Pt grants permission for [facility] to look into FA options and provides HH size and an estimate of her annual income. Will forward to FA team and cc assigned RPhs.

## 2022-02-14 RX ORDER — NAPROXEN 375 MG/1
375 TABLET ORAL 2 TIMES DAILY WITH MEALS
Qty: 180 TABLET | Refills: 1 | Status: SHIPPED | OUTPATIENT
Start: 2022-02-14 | End: 2022-08-27 | Stop reason: CLARIF

## 2022-02-17 NOTE — TELEPHONE ENCOUNTER
Outgoing call to patient to discuss Ginger STEVENSON. Patient requested it be mailed to her.   Mailed patent portion and faxed provider portion    [facility]

## 2022-02-24 PROBLEM — E21.3 HYPERPARATHYROIDISM: Status: ACTIVE | Noted: 2022-02-24

## 2022-02-28 ENCOUNTER — PATIENT MESSAGE (OUTPATIENT)
Dept: PHARMACY | Facility: CLINIC | Age: 77
End: 2022-02-28
Payer: MEDICARE

## 2022-03-03 NOTE — TELEPHONE ENCOUNTER
Pt portion received.  Application submitted to Elizabeth [phone].  [phone]        [facility]

## 2022-03-10 ENCOUNTER — PATIENT MESSAGE (OUTPATIENT)
Dept: RHEUMATOLOGY | Facility: CLINIC | Age: 77
End: 2022-03-10
Payer: MEDICARE

## 2022-03-10 ENCOUNTER — PATIENT MESSAGE (OUTPATIENT)
Dept: PHARMACY | Facility: CLINIC | Age: 77
End: 2022-03-10
Payer: MEDICARE

## 2022-03-11 NOTE — TELEPHONE ENCOUNTER
Pt approved for PAP with [employer] until 12/31/2022.  Pt has scheduled their first fill with program.      [facility]

## 2022-03-22 DIAGNOSIS — E06.3 HASHIMOTO'S THYROIDITIS: ICD-10-CM

## 2022-03-22 NOTE — TELEPHONE ENCOUNTER
Mohini  Pt requesting [place] Thyroid medication to be filled at a different pharmacy.      Medication pending.    Joyce Duque MA  3/22/2022

## 2022-03-23 RX ORDER — THYROID 60 MG/1
60 TABLET ORAL
Qty: 90 TABLET | Refills: 1 | Status: SHIPPED | OUTPATIENT
Start: 2022-03-23 | End: 2022-04-21 | Stop reason: SDUPTHER

## 2022-03-24 ENCOUNTER — PATIENT MESSAGE (OUTPATIENT)
Dept: RHEUMATOLOGY | Facility: CLINIC | Age: 77
End: 2022-03-24
Payer: MEDICARE

## 2022-04-03 PROBLEM — F32.9 REACTIVE DEPRESSION: Chronic | Status: ACTIVE | Noted: 2019-05-13

## 2022-04-03 PROBLEM — E04.1 NODULAR THYROID DISEASE: Chronic | Status: ACTIVE | Noted: 2020-02-07

## 2022-04-03 PROBLEM — I47.10 PAROXYSMAL SUPRAVENTRICULAR TACHYCARDIA: Chronic | Status: ACTIVE | Noted: 2021-04-05

## 2022-04-03 PROBLEM — D80.4 IGM DEFICIENCY: Chronic | Status: ACTIVE | Noted: 2021-04-05

## 2022-04-03 PROBLEM — K20.0 EOSINOPHILIC ESOPHAGITIS: Chronic | Status: ACTIVE | Noted: 2018-03-20

## 2022-04-03 PROBLEM — E06.3 HASHIMOTO'S DISEASE: Chronic | Status: ACTIVE | Noted: 2020-03-10

## 2022-04-03 PROBLEM — J45.20 MILD INTERMITTENT ASTHMA WITHOUT COMPLICATION: Chronic | Status: ACTIVE | Noted: 2018-03-20

## 2022-04-03 PROBLEM — Z15.89 MTHFR GENE MUTATION: Chronic | Status: ACTIVE | Noted: 2020-03-10

## 2022-04-03 PROBLEM — E78.5 HYPERLIPIDEMIA: Chronic | Status: ACTIVE | Noted: 2018-03-20

## 2022-04-03 PROBLEM — I70.0 ATHEROSCLEROSIS OF ABDOMINAL AORTA: Chronic | Status: ACTIVE | Noted: 2019-02-12

## 2022-04-03 PROBLEM — I10 BENIGN ESSENTIAL HTN: Chronic | Status: ACTIVE | Noted: 2018-03-20

## 2022-04-03 PROBLEM — M35.1 MCTD (MIXED CONNECTIVE TISSUE DISEASE): Chronic | Status: ACTIVE | Noted: 2021-04-05

## 2022-04-03 PROBLEM — F41.8 SITUATIONAL ANXIETY: Chronic | Status: ACTIVE | Noted: 2018-03-20

## 2022-04-03 PROBLEM — E21.3 HYPERPARATHYROIDISM: Status: RESOLVED | Noted: 2022-02-24 | Resolved: 2022-04-03

## 2022-04-03 PROBLEM — M85.89 OSTEOPENIA OF MULTIPLE SITES: Chronic | Status: ACTIVE | Noted: 2018-03-28

## 2022-04-06 ENCOUNTER — TELEPHONE (OUTPATIENT)
Dept: RHEUMATOLOGY | Facility: CLINIC | Age: 77
End: 2022-04-06
Payer: MEDICARE

## 2022-04-06 DIAGNOSIS — E06.3 HASHIMOTO'S THYROIDITIS: Primary | ICD-10-CM

## 2022-04-06 NOTE — TELEPHONE ENCOUNTER
----- Message from Rosa Guillaume MA sent at 4/6/2022  9:54 AM CDT -----  Regarding: FW: advice  Contact: Patient/[phone] (home)    ----- Message -----  From: Feli Salazar  Sent: 4/6/2022   9:50 AM CDT  To: Britt Mendoza Staff  Subject: advice                                           Type: Needs Medical Advice  Who Called:  Patient/[phone] (home)       Additional Information: Patient is having labs on 4/11/22 and is asking the doctor to add the panel of labs to check her thyroid since she changed her medication recently.     Also, she did labs on 3/29/22 for Dr. Roberto Carlos Ellison and she thought that the doctor could look to see if any of those labs can be used instead of redoing the same thing. Please call patient to advise.

## 2022-04-11 ENCOUNTER — LAB VISIT (OUTPATIENT)
Dept: LAB | Facility: HOSPITAL | Age: 77
End: 2022-04-11
Attending: INTERNAL MEDICINE
Payer: MEDICARE

## 2022-04-11 DIAGNOSIS — M35.1 MCTD (MIXED CONNECTIVE TISSUE DISEASE): ICD-10-CM

## 2022-04-11 DIAGNOSIS — M05.711 RHEUMATOID ARTHRITIS INVOLVING RIGHT SHOULDER WITH POSITIVE RHEUMATOID FACTOR: ICD-10-CM

## 2022-04-11 DIAGNOSIS — E06.3 HASHIMOTO'S THYROIDITIS: ICD-10-CM

## 2022-04-11 DIAGNOSIS — M75.101 ROTATOR CUFF TEAR ARTHROPATHY, RIGHT: ICD-10-CM

## 2022-04-11 DIAGNOSIS — M12.811 ROTATOR CUFF TEAR ARTHROPATHY, RIGHT: ICD-10-CM

## 2022-04-11 DIAGNOSIS — M06.00 SERONEGATIVE RHEUMATOID ARTHRITIS: ICD-10-CM

## 2022-04-11 LAB
CRP SERPL-MCNC: 0.6 MG/L (ref 0–8.2)
T4 FREE SERPL-MCNC: 0.74 NG/DL (ref 0.71–1.51)
TSH SERPL DL<=0.005 MIU/L-ACNC: 1.79 UIU/ML (ref 0.4–4)

## 2022-04-11 PROCEDURE — 36415 COLL VENOUS BLD VENIPUNCTURE: CPT | Mod: PO | Performed by: INTERNAL MEDICINE

## 2022-04-11 PROCEDURE — 84443 ASSAY THYROID STIM HORMONE: CPT | Performed by: PHYSICIAN ASSISTANT

## 2022-04-11 PROCEDURE — 84439 ASSAY OF FREE THYROXINE: CPT | Performed by: PHYSICIAN ASSISTANT

## 2022-04-11 PROCEDURE — 86140 C-REACTIVE PROTEIN: CPT | Performed by: INTERNAL MEDICINE

## 2022-04-11 PROCEDURE — 85652 RBC SED RATE AUTOMATED: CPT | Performed by: INTERNAL MEDICINE

## 2022-04-12 LAB — ERYTHROCYTE [SEDIMENTATION RATE] IN BLOOD BY WESTERGREN METHOD: <2 MM/HR (ref 0–36)

## 2022-04-18 ENCOUNTER — OFFICE VISIT (OUTPATIENT)
Dept: RHEUMATOLOGY | Facility: CLINIC | Age: 77
End: 2022-04-18
Payer: MEDICARE

## 2022-04-18 VITALS
DIASTOLIC BLOOD PRESSURE: 80 MMHG | HEIGHT: 62 IN | HEART RATE: 92 BPM | SYSTOLIC BLOOD PRESSURE: 146 MMHG | BODY MASS INDEX: 26.31 KG/M2 | WEIGHT: 143 LBS

## 2022-04-18 DIAGNOSIS — M54.12 CERVICAL RADICULOPATHY: ICD-10-CM

## 2022-04-18 DIAGNOSIS — G89.29 CHRONIC BILATERAL LOW BACK PAIN WITH BILATERAL SCIATICA: ICD-10-CM

## 2022-04-18 DIAGNOSIS — M06.00 SERONEGATIVE RHEUMATOID ARTHRITIS: ICD-10-CM

## 2022-04-18 DIAGNOSIS — R74.8 ELEVATED LIVER ENZYMES: ICD-10-CM

## 2022-04-18 DIAGNOSIS — M54.41 CHRONIC BILATERAL LOW BACK PAIN WITH BILATERAL SCIATICA: ICD-10-CM

## 2022-04-18 DIAGNOSIS — M54.42 CHRONIC BILATERAL LOW BACK PAIN WITH BILATERAL SCIATICA: ICD-10-CM

## 2022-04-18 DIAGNOSIS — K90.41 NON-CELIAC GLUTEN SENSITIVITY: ICD-10-CM

## 2022-04-18 DIAGNOSIS — E06.3 HASHIMOTO'S THYROIDITIS: ICD-10-CM

## 2022-04-18 DIAGNOSIS — M35.1 MCTD (MIXED CONNECTIVE TISSUE DISEASE): Primary | ICD-10-CM

## 2022-04-18 PROCEDURE — 99999 PR PBB SHADOW E&M-EST. PATIENT-LVL V: ICD-10-PCS | Mod: PBBFAC,,, | Performed by: PHYSICIAN ASSISTANT

## 2022-04-18 PROCEDURE — 99215 OFFICE O/P EST HI 40 MIN: CPT | Mod: 25,S$GLB,, | Performed by: PHYSICIAN ASSISTANT

## 2022-04-18 PROCEDURE — 3079F PR MOST RECENT DIASTOLIC BLOOD PRESSURE 80-89 MM HG: ICD-10-PCS | Mod: CPTII,S$GLB,, | Performed by: PHYSICIAN ASSISTANT

## 2022-04-18 PROCEDURE — 1159F MED LIST DOCD IN RCRD: CPT | Mod: CPTII,S$GLB,, | Performed by: PHYSICIAN ASSISTANT

## 2022-04-18 PROCEDURE — 3077F SYST BP >= 140 MM HG: CPT | Mod: CPTII,S$GLB,, | Performed by: PHYSICIAN ASSISTANT

## 2022-04-18 PROCEDURE — 3079F DIAST BP 80-89 MM HG: CPT | Mod: CPTII,S$GLB,, | Performed by: PHYSICIAN ASSISTANT

## 2022-04-18 PROCEDURE — 1159F PR MEDICATION LIST DOCUMENTED IN MEDICAL RECORD: ICD-10-PCS | Mod: CPTII,S$GLB,, | Performed by: PHYSICIAN ASSISTANT

## 2022-04-18 PROCEDURE — 96372 PR INJECTION,THERAP/PROPH/DIAG2ST, IM OR SUBCUT: ICD-10-PCS | Mod: S$GLB,,, | Performed by: PHYSICIAN ASSISTANT

## 2022-04-18 PROCEDURE — 1125F PR PAIN SEVERITY QUANTIFIED, PAIN PRESENT: ICD-10-PCS | Mod: CPTII,S$GLB,, | Performed by: PHYSICIAN ASSISTANT

## 2022-04-18 PROCEDURE — 1125F AMNT PAIN NOTED PAIN PRSNT: CPT | Mod: CPTII,S$GLB,, | Performed by: PHYSICIAN ASSISTANT

## 2022-04-18 PROCEDURE — 96372 THER/PROPH/DIAG INJ SC/IM: CPT | Mod: S$GLB,,, | Performed by: PHYSICIAN ASSISTANT

## 2022-04-18 PROCEDURE — 1160F PR REVIEW ALL MEDS BY PRESCRIBER/CLIN PHARMACIST DOCUMENTED: ICD-10-PCS | Mod: CPTII,S$GLB,, | Performed by: PHYSICIAN ASSISTANT

## 2022-04-18 PROCEDURE — 99215 PR OFFICE/OUTPT VISIT, EST, LEVL V, 40-54 MIN: ICD-10-PCS | Mod: 25,S$GLB,, | Performed by: PHYSICIAN ASSISTANT

## 2022-04-18 PROCEDURE — 1160F RVW MEDS BY RX/DR IN RCRD: CPT | Mod: CPTII,S$GLB,, | Performed by: PHYSICIAN ASSISTANT

## 2022-04-18 PROCEDURE — 3077F PR MOST RECENT SYSTOLIC BLOOD PRESSURE >= 140 MM HG: ICD-10-PCS | Mod: CPTII,S$GLB,, | Performed by: PHYSICIAN ASSISTANT

## 2022-04-18 PROCEDURE — 99999 PR PBB SHADOW E&M-EST. PATIENT-LVL V: CPT | Mod: PBBFAC,,, | Performed by: PHYSICIAN ASSISTANT

## 2022-04-18 RX ORDER — CYANOCOBALAMIN 1000 UG/ML
1000 INJECTION, SOLUTION INTRAMUSCULAR; SUBCUTANEOUS
Status: COMPLETED | OUTPATIENT
Start: 2022-04-18 | End: 2022-04-18

## 2022-04-18 RX ORDER — KETOROLAC TROMETHAMINE 30 MG/ML
60 INJECTION, SOLUTION INTRAMUSCULAR; INTRAVENOUS
Status: COMPLETED | OUTPATIENT
Start: 2022-04-18 | End: 2022-04-18

## 2022-04-18 RX ADMIN — CYANOCOBALAMIN 1000 MCG: 1000 INJECTION, SOLUTION INTRAMUSCULAR; SUBCUTANEOUS at 12:04

## 2022-04-18 RX ADMIN — KETOROLAC TROMETHAMINE 60 MG: 30 INJECTION, SOLUTION INTRAMUSCULAR; INTRAVENOUS at 12:04

## 2022-04-18 ASSESSMENT — ROUTINE ASSESSMENT OF PATIENT INDEX DATA (RAPID3)
PATIENT GLOBAL ASSESSMENT SCORE: 5
PAIN SCORE: 6
TOTAL RAPID3 SCORE: 5.11
FATIGUE SCORE: 1.1
PSYCHOLOGICAL DISTRESS SCORE: 2.2
MDHAQ FUNCTION SCORE: 1.3

## 2022-04-18 NOTE — PROGRESS NOTES
Subjective:       Patient ID: Elham Arteaga is a 76 y.o. female.    Chief Complaint: Disease Management    Mrs. Arteaga is a 76 year old female who presents to clinic for follow up on MCTD and seronegative RA. Treatment was changed to Kevzara at her last visit and she completed 3 injections so far. No injection site reactions or side effects noted. She is not sure if she has noticed any improvement in her peripheral joint pain. She has pain and swelling in her hands specifically PIPs, wrists, and shoulders. She underwent R rotator cuff surgery Jan 17th Dr. Batista and is recovering fairly well; currently in PT. She had a fall on the R side in June 2021 that is when initial injury occurred and she completed PT for months without improvement.     She is having neck pain with radiating/shooting pain down the R arm into the hand. She has weakness in her hand as well. Neck pain has been ongoing for several months and has gradually progressed. She has low back/SI joint pain, which is constant and aching. She has worse muscle pain in her legs, which she has attributed to PT.     She is tolerating change from levothyroxine to armor thyroid. Recent TSH/T4 is normal. She is due for thyroid US to monitor nodule. She has history of elevated PTH and bordeline calcium.    She was taking norco and tylenol arthritis PRN for pain following surgery, but she is no longer taking this.    She had covid infection 1/26/22 and still is suffering with brain fog and poor appetite since that infection. Not vaccinated.     PCP recommended she consider nexletol for HLP.    Current tx:  1. kevzara  2. naproxen    Prior tx:  1. Plaquenil  2. mobic   3. Rinvoq        Review of Systems   Constitutional: Positive for activity change and fatigue. Negative for appetite change, chills and fever.   Eyes: Negative for visual disturbance.   Respiratory: Negative for cough and shortness of breath.    Cardiovascular: Negative for chest pain, palpitations and  leg swelling.   Gastrointestinal: Negative for abdominal pain, constipation, diarrhea, nausea and vomiting.   Musculoskeletal: Positive for arthralgias, back pain, joint swelling and myalgias.   Allergic/Immunologic: Positive for immunocompromised state.   Neurological: Positive for weakness. Negative for dizziness, light-headedness and headaches.   Psychiatric/Behavioral: Negative for dysphoric mood. The patient is not nervous/anxious.          Objective:     Vitals:    22 1109   BP: (!) 146/80   Pulse: 92       Past Medical History:   Diagnosis Date    Acute esophageal obstruction     Asthma     seasonal    Celiac disease 2020    Complex tear of lateral meniscus of right knee as current injury 3/28/2017    Depression     Duodenal ulcer     without hemorrhage or perforation     Dysphagia, unspecified(787.20)     Eosinophilic esophagitis     Fibrocystic breast     Generalized osteoarthrosis, involving multiple sites     GERD (gastroesophageal reflux disease)     Hyperlipidemia     Hyperparathyroidism 2022    Hypothyroidism     Lipoma of unspecified site     Other specified cardiac dysrhythmias(427.89)     PONV (postoperative nausea and vomiting)     Rheumatoid arthritis     Screening for other and unspecified cardiovascular conditions     Sinus problem     Stricture and stenosis of esophagus     Trigger ring finger of right hand     Unspecified asthma(493.90)      Past Surgical History:   Procedure Laterality Date    214.9      ADENOIDECTOMY      ANKLE SURGERY  2009    cyst, left     SECTION, LOW TRANSVERSE      times 1    CHOLECYSTECTOMY      ESOPHAGOGASTRODUODENOSCOPY N/A 2019    Procedure: EGD (ESOPHAGOGASTRODUODENOSCOPY);  Surgeon: Brandon Negron MD;  Location: [facility];  Service: Endoscopy;  Laterality: N/A;    HAND SURGERY      HERNIA REPAIR      left inguinal    HYSTERECTOMY      OOPHORECTOMY      SHOULDER SURGERY  2022    dr ch     TONSILLECTOMY      UPPER GASTROINTESTINAL ENDOSCOPY      VAGINAL DELIVERY      times 2          Physical Exam   Eyes: Right conjunctiva is not injected. Left conjunctiva is not injected.   Neck: No JVD present. No thyromegaly present.   Cardiovascular: Normal rate and regular rhythm. Exam reveals no decreased pulses.   Pulmonary/Chest: Effort normal.   Musculoskeletal:      Right shoulder: Tenderness present.      Left shoulder: Normal.      Right elbow: Normal.      Left elbow: Normal.      Right wrist: Swelling and tenderness present.      Left wrist: Normal.      Left knee: Normal.   Lymphadenopathy:     She has no cervical adenopathy.   Neurological: She displays weakness (right hand). Gait normal.   Skin: No rash noted.   Psychiatric: Mood and affect normal.       Right Side Rheumatological Exam     Examination finds the elbow, 4th MCP and 5th MCP normal.    The patient is tender to palpation of the shoulder, wrist, 1st PIP, 1st MCP, 2nd PIP, 2nd MCP, 3rd PIP, 3rd MCP, 4th PIP, 5th PIP and 1st CMC    She has swelling of the wrist, 1st PIP, 1st MCP, 2nd PIP, 2nd MCP, 3rd PIP, 3rd MCP, 4th PIP and 5th PIP    Left Side Rheumatological Exam     Examination finds the shoulder, elbow, wrist, knee, 2nd MCP, 3rd MCP, 4th MCP and 5th MCP normal.    The patient is tender to palpation of the 1st PIP, 2nd PIP, 3rd PIP, 4th PIP, 5th PIP and 1st CMC.    She has swelling of the 1st PIP, 2nd PIP, 3rd PIP, 4th PIP and 5th PIP      Back/Neck Exam   Tenderness Right paramedian tenderness of the Lower L-Spine, Upper L-Spine, Lower C-Spine and Upper C-Spine.Left paramedian tenderness of the Lower L-Spine, Upper L-Spine, Upper C-Spine and Lower C-Spine.          Labs reviewed:  Component      Latest Ref Rng & Units 4/11/2022   Sed Rate      0 - 36 mm/Hr <2   CRP      0.0 - 8.2 mg/L 0.6   TSH      0.400 - 4.000 uIU/mL 1.792   Free T4      0.71 - 1.51 ng/dL 0.74     Component      Latest Ref Rng & Units 3/29/2022   WBC      3.90  - 12.70 K/uL 4.92   RBC      4.00 - 5.40 M/uL 4.83   Hemoglobin      12.0 - 16.0 g/dL 13.8   Hematocrit      37.0 - 48.5 % 42.1   MCV      82 - 98 fL 87   MCH      27.0 - 31.0 pg 28.6   MCHC      32.0 - 36.0 g/dL 32.8   RDW      11.5 - 14.5 % 15.1 (H)   Platelets      150 - 450 K/uL 214   MPV      9.2 - 12.9 fL 9.5   Immature Granulocytes      0.0 - 0.5 % 0.0   Gran # (ANC)      1.8 - 7.7 K/uL 2.3   Immature Grans (Abs)      0.00 - 0.04 K/uL 0.00   Lymph #      1.0 - 4.8 K/uL 2.1   Mono #      0.3 - 1.0 K/uL 0.4   Eos #      0.0 - 0.5 K/uL 0.1   Baso #      0.00 - 0.20 K/uL 0.05   nRBC      0 /100 WBC 0   Gran %      38.0 - 73.0 % 47.2   Lymph %      18.0 - 48.0 % 42.1   Mono %      4.0 - 15.0 % 7.3   Eosinophil %      0.0 - 8.0 % 2.4   Basophil %      0.0 - 1.9 % 1.0   Differential Method       Automated   Sodium      136 - 145 mmol/L 140   Potassium      3.5 - 5.1 mmol/L 4.4   Chloride      95 - 110 mmol/L 104   CO2      22 - 31 mmol/L 27   Glucose      70 - 110 mg/dL 107   BUN      7 - 18 mg/dL 15   Creatinine      0.50 - 1.40 mg/dL 0.72   Calcium      8.4 - 10.2 mg/dL 10.1   PROTEIN TOTAL      6.0 - 8.4 g/dL 7.1   Albumin      3.5 - 5.2 g/dL 4.4   BILIRUBIN TOTAL      0.2 - 1.3 mg/dL 1.1   Alkaline Phosphatase      38 - 145 U/L 121   AST      14 - 36 U/L 44 (H)   ALT      0 - 35 U/L 41 (H)   Anion Gap      8 - 16 mmol/L 9   eGFR if African American      >60 mL/min/1.73 m:2 >60   eGFR if non African American      >60 mL/min/1.73 m:2 >60   Vit D, 25-Hydroxy      30 - 96 ng/mL 50     Assessment:       1. MCTD (mixed connective tissue disease)    2. Seronegative rheumatoid arthritis    3. Hashimoto's thyroiditis    4. Non-celiac gluten sensitivity    5. Cervical radiculopathy    6. Chronic bilateral low back pain with bilateral sciatica    7. Elevated liver enzymes            Plan:       MCTD (mixed connective tissue disease)  -     ketorolac injection 60 mg  -     cyanocobalamin injection 1,000 mcg    Seronegative  rheumatoid arthritis  -     ketorolac injection 60 mg  -     cyanocobalamin injection 1,000 mcg    Hashimoto's thyroiditis  -     US Soft Tissue Head Neck Thyroid; Future; Expected date: 04/18/2022    Non-celiac gluten sensitivity    Cervical radiculopathy  -     X-Ray Cervical Spine 2 or 3 Views; Future; Expected date: 04/18/2022    Chronic bilateral low back pain with bilateral sciatica  -     X-Ray Lumbar Spine 5 View; Future; Expected date: 04/18/2022  -     X-Ray Sacroiliac Joints 3 Views; Future; Expected date: 04/18/2022    Elevated liver enzymes  -     AST (SGOT); Future; Expected date: 04/18/2022  -     ALT (SGPT); Future; Expected date: 04/18/2022        Assessment:  76 year old female with  MCTD MATEUSZ 1:320 speckled, seronegative RA, homogenous, elevated CRP  --elevated PTH  --MTHFR  --NCGS  --Osteoarthritis  --sulfa allergy  --hyperlipidemia, unable to tolerate statins, on lovaza    Plan:  1. toradol 60, b12 given today  2. X-ray cervical spine, lumbar, SI joints. May need MRI cervical and pain management referral  3. Repeat LFTs in 1 month. If remain elevated may be s/e kevzara  4. Thyroid US ordered. Cont armour thyroid 60 mg  5. Cont kevzara every 14 days    Follow up:  3-4 mo Dr. De La Torre w/labs prior

## 2022-04-18 NOTE — PROGRESS NOTES
Administered B12, 1000 mcg, 1cc  in Right Upper Gluteal    Administered in Ketorolac 60 mg, 2 cc's in Left Upper Gluteal    2 Patient ID's verified and allergies reviewed

## 2022-04-21 DIAGNOSIS — E06.3 HASHIMOTO'S THYROIDITIS: ICD-10-CM

## 2022-04-21 RX ORDER — THYROID 60 MG/1
60 TABLET ORAL
Qty: 90 TABLET | Refills: 1 | Status: SHIPPED | OUTPATIENT
Start: 2022-04-21 | End: 2022-10-20 | Stop reason: SDUPTHER

## 2022-04-22 ENCOUNTER — HOSPITAL ENCOUNTER (OUTPATIENT)
Dept: RADIOLOGY | Facility: HOSPITAL | Age: 77
Discharge: HOME | End: 2022-04-22
Attending: PHYSICIAN ASSISTANT
Payer: MEDICARE

## 2022-04-22 ENCOUNTER — TELEPHONE (OUTPATIENT)
Dept: RHEUMATOLOGY | Facility: CLINIC | Age: 77
End: 2022-04-22
Payer: MEDICARE

## 2022-04-22 DIAGNOSIS — R74.8 ELEVATED LIVER ENZYMES: Primary | ICD-10-CM

## 2022-04-22 DIAGNOSIS — M54.41 CHRONIC BILATERAL LOW BACK PAIN WITH BILATERAL SCIATICA: ICD-10-CM

## 2022-04-22 DIAGNOSIS — G89.29 CHRONIC BILATERAL LOW BACK PAIN WITH BILATERAL SCIATICA: ICD-10-CM

## 2022-04-22 DIAGNOSIS — M54.42 CHRONIC BILATERAL LOW BACK PAIN WITH BILATERAL SCIATICA: ICD-10-CM

## 2022-04-22 DIAGNOSIS — M54.12 CERVICAL RADICULOPATHY: ICD-10-CM

## 2022-04-22 DIAGNOSIS — E06.3 HASHIMOTO'S THYROIDITIS: ICD-10-CM

## 2022-04-22 PROCEDURE — 72040 X-RAY EXAM NECK SPINE 2-3 VW: CPT | Mod: TC,FY,PO

## 2022-04-22 PROCEDURE — 76536 US EXAM OF HEAD AND NECK: CPT | Mod: TC,PO

## 2022-04-22 PROCEDURE — 72040 X-RAY EXAM NECK SPINE 2-3 VW: CPT | Mod: 26,,, | Performed by: RADIOLOGY

## 2022-04-22 PROCEDURE — 76536 US EXAM OF HEAD AND NECK: CPT | Mod: 26,,, | Performed by: RADIOLOGY

## 2022-04-22 PROCEDURE — 72200 X-RAY EXAM SI JOINTS: CPT | Mod: TC,FY,PO

## 2022-04-22 PROCEDURE — 76536 US SOFT TISSUE HEAD NECK THYROID: ICD-10-PCS | Mod: 26,,, | Performed by: RADIOLOGY

## 2022-04-22 PROCEDURE — 72040 XR CERVICAL SPINE 2 OR 3 VIEWS: ICD-10-PCS | Mod: 26,,, | Performed by: RADIOLOGY

## 2022-04-22 PROCEDURE — 72200 X-RAY EXAM SI JOINTS: CPT | Mod: 26,,, | Performed by: RADIOLOGY

## 2022-04-22 PROCEDURE — 72110 X-RAY EXAM L-2 SPINE 4/>VWS: CPT | Mod: TC,FY,PO

## 2022-04-22 PROCEDURE — 72110 XR LUMBAR SPINE COMPLETE 5 VIEW: ICD-10-PCS | Mod: 26,,, | Performed by: RADIOLOGY

## 2022-04-22 PROCEDURE — 72200 XR SACROILIAC JOINTS 3 VIEWS: ICD-10-PCS | Mod: 26,,, | Performed by: RADIOLOGY

## 2022-04-22 PROCEDURE — 72110 X-RAY EXAM L-2 SPINE 4/>VWS: CPT | Mod: 26,,, | Performed by: RADIOLOGY

## 2022-04-22 NOTE — TELEPHONE ENCOUNTER
Called patient and scheduled labs  ----- Message from Maite Self sent at 4/22/2022 10:02 AM CDT -----  Contact: pt at [phone]  Type: Needs Medical Advice  Who Called: pt  Best Call Back Number: [phone]  Additional Information: pt is calling the office to speak with the nurse in regards to the liver enzyme tests the pt has orders for. There is a question of when the pt needs to have these tests done. Please call back and advise.

## 2022-05-18 ENCOUNTER — LAB VISIT (OUTPATIENT)
Dept: LAB | Facility: HOSPITAL | Age: 77
End: 2022-05-18
Attending: PHYSICIAN ASSISTANT
Payer: MEDICARE

## 2022-05-18 DIAGNOSIS — R74.8 ELEVATED LIVER ENZYMES: ICD-10-CM

## 2022-05-18 LAB
ALT SERPL W/O P-5'-P-CCNC: 39 U/L (ref 10–44)
AST SERPL-CCNC: 29 U/L (ref 10–40)

## 2022-05-18 PROCEDURE — 84450 TRANSFERASE (AST) (SGOT): CPT | Performed by: PHYSICIAN ASSISTANT

## 2022-05-18 PROCEDURE — 36415 COLL VENOUS BLD VENIPUNCTURE: CPT | Mod: PO | Performed by: PHYSICIAN ASSISTANT

## 2022-05-18 PROCEDURE — 84460 ALANINE AMINO (ALT) (SGPT): CPT | Performed by: PHYSICIAN ASSISTANT

## 2022-07-27 ENCOUNTER — OFFICE VISIT (OUTPATIENT)
Dept: NEUROSURGERY | Facility: CLINIC | Age: 77
End: 2022-07-27
Payer: MEDICARE

## 2022-07-27 VITALS
RESPIRATION RATE: 18 BRPM | WEIGHT: 149.25 LBS | BODY MASS INDEX: 27.47 KG/M2 | HEART RATE: 104 BPM | DIASTOLIC BLOOD PRESSURE: 75 MMHG | SYSTOLIC BLOOD PRESSURE: 128 MMHG | HEIGHT: 62 IN

## 2022-07-27 DIAGNOSIS — M54.12 CERVICAL RADICULOPATHY: ICD-10-CM

## 2022-07-27 DIAGNOSIS — M54.16 LUMBAR RADICULOPATHY: Primary | ICD-10-CM

## 2022-07-27 PROCEDURE — 3074F PR MOST RECENT SYSTOLIC BLOOD PRESSURE < 130 MM HG: ICD-10-PCS | Mod: CPTII,S$GLB,, | Performed by: STUDENT IN AN ORGANIZED HEALTH CARE EDUCATION/TRAINING PROGRAM

## 2022-07-27 PROCEDURE — 3078F DIAST BP <80 MM HG: CPT | Mod: CPTII,S$GLB,, | Performed by: STUDENT IN AN ORGANIZED HEALTH CARE EDUCATION/TRAINING PROGRAM

## 2022-07-27 PROCEDURE — 3074F SYST BP LT 130 MM HG: CPT | Mod: CPTII,S$GLB,, | Performed by: STUDENT IN AN ORGANIZED HEALTH CARE EDUCATION/TRAINING PROGRAM

## 2022-07-27 PROCEDURE — 1100F PR PT FALLS ASSESS DOC 2+ FALLS/FALL W/INJURY/YR: ICD-10-PCS | Mod: CPTII,S$GLB,, | Performed by: STUDENT IN AN ORGANIZED HEALTH CARE EDUCATION/TRAINING PROGRAM

## 2022-07-27 PROCEDURE — 1125F PR PAIN SEVERITY QUANTIFIED, PAIN PRESENT: ICD-10-PCS | Mod: CPTII,S$GLB,, | Performed by: STUDENT IN AN ORGANIZED HEALTH CARE EDUCATION/TRAINING PROGRAM

## 2022-07-27 PROCEDURE — 3078F PR MOST RECENT DIASTOLIC BLOOD PRESSURE < 80 MM HG: ICD-10-PCS | Mod: CPTII,S$GLB,, | Performed by: STUDENT IN AN ORGANIZED HEALTH CARE EDUCATION/TRAINING PROGRAM

## 2022-07-27 PROCEDURE — 1100F PTFALLS ASSESS-DOCD GE2>/YR: CPT | Mod: CPTII,S$GLB,, | Performed by: STUDENT IN AN ORGANIZED HEALTH CARE EDUCATION/TRAINING PROGRAM

## 2022-07-27 PROCEDURE — 99204 PR OFFICE/OUTPT VISIT, NEW, LEVL IV, 45-59 MIN: ICD-10-PCS | Mod: S$GLB,,, | Performed by: STUDENT IN AN ORGANIZED HEALTH CARE EDUCATION/TRAINING PROGRAM

## 2022-07-27 PROCEDURE — 1125F AMNT PAIN NOTED PAIN PRSNT: CPT | Mod: CPTII,S$GLB,, | Performed by: STUDENT IN AN ORGANIZED HEALTH CARE EDUCATION/TRAINING PROGRAM

## 2022-07-27 PROCEDURE — 3288F FALL RISK ASSESSMENT DOCD: CPT | Mod: CPTII,S$GLB,, | Performed by: STUDENT IN AN ORGANIZED HEALTH CARE EDUCATION/TRAINING PROGRAM

## 2022-07-27 PROCEDURE — 99204 OFFICE O/P NEW MOD 45 MIN: CPT | Mod: S$GLB,,, | Performed by: STUDENT IN AN ORGANIZED HEALTH CARE EDUCATION/TRAINING PROGRAM

## 2022-07-27 PROCEDURE — 3288F PR FALLS RISK ASSESSMENT DOCUMENTED: ICD-10-PCS | Mod: CPTII,S$GLB,, | Performed by: STUDENT IN AN ORGANIZED HEALTH CARE EDUCATION/TRAINING PROGRAM

## 2022-08-08 ENCOUNTER — CLINICAL SUPPORT (OUTPATIENT)
Dept: REHABILITATION | Facility: HOSPITAL | Age: 77
End: 2022-08-08
Attending: STUDENT IN AN ORGANIZED HEALTH CARE EDUCATION/TRAINING PROGRAM
Payer: MEDICARE

## 2022-08-08 ENCOUNTER — TELEPHONE (OUTPATIENT)
Dept: RHEUMATOLOGY | Facility: CLINIC | Age: 77
End: 2022-08-08
Payer: MEDICARE

## 2022-08-08 DIAGNOSIS — M06.00 SERONEGATIVE RHEUMATOID ARTHRITIS: ICD-10-CM

## 2022-08-08 DIAGNOSIS — M54.16 LUMBAR RADICULOPATHY: ICD-10-CM

## 2022-08-08 DIAGNOSIS — M35.1 MCTD (MIXED CONNECTIVE TISSUE DISEASE): Primary | ICD-10-CM

## 2022-08-08 PROCEDURE — 97110 THERAPEUTIC EXERCISES: CPT | Mod: PO | Performed by: PHYSICAL THERAPIST

## 2022-08-08 PROCEDURE — 97162 PT EVAL MOD COMPLEX 30 MIN: CPT | Mod: PO | Performed by: PHYSICAL THERAPIST

## 2022-08-08 NOTE — TELEPHONE ENCOUNTER
Patient inquiring about if labs are needed before next rheumatology appt. Will call to schedule if BIJAN Campbell advises to do so.

## 2022-08-08 NOTE — PATIENT INSTRUCTIONS
Lay on elbows 3-5 mins 3-5x/day.  Perform repeated lumbar spine extension in standing 10-15 reps during prolonged bending forward.   Limit forward bending.

## 2022-08-08 NOTE — PLAN OF CARE
[facility]  Physical Therapy Initial Evaluation    Name: Elham Arteaga  Clinic Number: [id number]    Therapy Diagnosis:   Encounter Diagnosis   Name Primary?    Lumbar radiculopathy      Physician: Darrell Condon MD    Physician Orders: PT Eval and Treat Post Surgical? No Eval and Treat Yes Type of Therapy Outpatient Therapy Location: Back   Medical Diagnosis from Referral: M54.16 (ICD-10-CM) - Lumbar radiculopathy  Evaluation Date: 8/8/2022  Authorization Period Expiration: 08/03/2023   Plan of Care Expiration: 10/7/2022  Visit # / Visits authorized: 1/ 1    Time In: 1500  Time Out: 1600  Total Billable Time: 45 minutes    Precautions: Standard and Fall    Subjective   Date of onset: chronic >2 yrs  History of current condition - Elham reports: LBP that began about 2 yrs ago with tightness described as a tight band. Leg pain began about the same time. She reports bilateral hip bone spurs. Driving 1.5 miles cripples her by report. She has trouble with prolonged walking and standing. She fell a yr ago in the rain. She fell and hit her right side on a FOOSH and received PT for this for RTC. She has not had any surgeries for her back. Laying over chair decreases pain. (bottom half hanging)     Medical History:   Past Medical History:   Diagnosis Date    Acute esophageal obstruction     Asthma     seasonal    Celiac disease 07/2020    Complex tear of lateral meniscus of right knee as current injury 3/28/2017    Depression     Duodenal ulcer     without hemorrhage or perforation     Dysphagia, unspecified(787.20)     Eosinophilic esophagitis     Fibrocystic breast     Generalized osteoarthrosis, involving multiple sites     GERD (gastroesophageal reflux disease)     Hyperlipidemia     Hyperparathyroidism 2/24/2022    Hypothyroidism     Lipoma of unspecified site     Other specified cardiac dysrhythmias(427.89)     PONV (postoperative nausea and vomiting)     Rheumatoid  "arthritis     Screening for other and unspecified cardiovascular conditions     Sinus problem     Stricture and stenosis of esophagus     Trigger ring finger of right hand     Unspecified asthma(493.90)      Surgical History:   Elham Arteaga  has a past surgical history that includes Cholecystectomy; Hernia repair (); Tonsillectomy; Ankle surgery (); Vaginal delivery;  section, low transverse; Hand surgery; 214.9; Adenoidectomy; Upper gastrointestinal endoscopy; Esophagogastroduodenoscopy (N/A, 2019); Hysterectomy (); Oophorectomy; and Shoulder surgery (2022).    Medications:   Elham has a current medication list which includes the following prescription(s): acetaminophen, albuterol, alprazolam, amino acids, b complex vitamins, ergocalciferol, fluticasone propionate, fluticasone-salmeterol 250-50 mcg/dose, loratadine, naproxen, omega-3 acid ethyl esters, and thyroid (pork).    Allergies:   Review of patient's allergies indicates:   Allergen Reactions    Aciphex [rabeprazole] Other (See Comments)    Amlodipine Other (See Comments)    Ceftin [cefuroxime axetil] Nausea Only    Codeine Anaphylaxis and Nausea And Vomiting    Crestor [rosuvastatin] Other (See Comments)    Lactose Other (See Comments)    Lisinopril Other (See Comments)    Promethazine Other (See Comments)    Protonix [pantoprazole] Other (See Comments)    Prozac [fluoxetine] Other (See Comments)    Wellbutrin [bupropion hcl] Other (See Comments)    Eggs [egg derived] Other (See Comments)     "scarring esophagus"    Gluten protein     Kevzara [sarilumab] Other (See Comments)     Hair fell out and GI upset    Mobic [meloxicam]      Watery eyes, vaginal itching    Morphine Other (See Comments)     Hallucinations, angry, violent    Penicillins Hives    Plaquenil [hydroxychloroquine] Hives     Broke out in hives and whelps all over.    Soy     Sulfa (sulfonamide antibiotics) Other (See Comments)     Told no " Sulfa drugs    Wheat containing prod     Xeljanz [tofacitinib]      Dyspnea, change in thought processes.     Elavil [amitriptyline] Other (See Comments)     nightmares        Imaging,          MRI Lumbar Spine Without Contrast  Order: [id number]   Status: Final result     Visible to patient: Yes (seen)     Next appt: 08/30/2022 at 08:00 AM in Rheumatology (Reji De La Torre MD)     Dx: Abnormal x-ray of lumbar spine     2 Result Notes     1 Patient Communication    Details    Reading Physician Reading Date Result Priority   Luis Alberto Vargas MD  [phone] 7/21/2022 Routine     Narrative & Impression  EXAMINATION:  MRI LUMBAR SPINE WITHOUT CONTRAST     CLINICAL HISTORY:  Low back and bilateral leg pain, no trauma     TECHNIQUE:  Multiplanar MRI performed through the lumbar spine without contrast.     COMPARISON:  X-rays 04/22/2022     FINDINGS:  Vertebral bodies are normal in height and alignment.  No osseous edema or acute fracture.  There is disc desiccation throughout the lumbar spine with disc space narrowing at L4-5 and L5-S1.     Conus medullaris terminates at the L1-2 level.     L1-2: No significant findings.     L2-3: No significant findings.     L3-4: No significant findings.     L4-5: Small broad-based disc bulge with superimposed central disc protrusion along with mild facet hypertrophy results in subarticular recess stenosis bilaterally.  Spinal canal is patent.     L5-S1: Mild facet hypertrophy and a small broad-based disc bulge results in moderate left and mild-to-moderate right neural foraminal and lateral recess stenosis.  The disc bulge contacts the bilateral traversing S1 nerve roots.     Paravertebral soft tissues are normal.     Impression:     1. Moderate left and mild-to-moderate right neural foraminal and lateral recess stenosis at L5-S1 due to a broad-based disc bulge which also contacts the traversing S1 nerve roots bilaterally.  2. Small broad-based disc bulge with superimposed central disc  "protrusion results in bilateral subarticular recess stenosis at L4-5.        Electronically signed by: Luis Alberto Vargas MD  Date:                                            07/21/2022  Time:                                           12:46             Exam Ended: 07/21/22 12:25 Last Resulted: 07/21/22 12:46           Prior Therapy: none  Social History:  lives alone  Occupation: retired  Prior Level of Function: (I) with ADLs without back pain.  Current Level of Function: Driving 1.5 miles cripples her by report. She has trouble with prolonged walking and standing.    Pain:  Current 3/10, worst 10/10, best 3/10   Location: bilateral back  and buttocks, ant thighs mostly  Description: Aching, Dull, Sharp and Shooting  Aggravating Factors: Sitting, Standing and Walking for too long  Easing Factors: rest and moving    Pts goals: "to feel better, to keep my legs going, dec back pain."    Objective     Observation: FHP, rounded shoulders, increasing t-spine kyphosis.    Posture:  poor    Lumbar Range of Motion:    % Pain   Flexion 75   No complaints        Extension 50   +        Left Side Bending 100 neg        Right Side Bending 100 neg          Lower Extremity Strength  Right LE  Left LE    Knee extension: 4+/5 Knee extension: 4/5   Knee flexion: 5/5 Knee flexion: 5/5   Hip flexion: 4/5 Hip flexion: 4/5   Hip extension:  4/5 Hip extension: 4-/5   Hip abduction: 4-/5 Hip abduction: 4-/5   Hip adduction: 4-/5 Hip adduction 4-/5   Ankle dorsiflexion: 5/5 Ankle dorsiflexion: 5/5   Ankle plantarflexion: 5/5 Ankle plantarflexion: 5/5     Special Tests:  -Repeated Ext: prone on elbows in lumbar spine extension x 3 mins-better dec to back only    DTR:   Right Left Comment   Patellar (L3-4) 2+ 2+    Achilles (S1) 2+ 2+        Neuro Dynamic Testing:    Sciatic nerve:      SLR: R = neg     L = neg    Joint Mobility: NT    Palpation: NT    Sensation: dec right L2, lat thigh    CMS Impairment/Limitation/Restriction for FOTO LUMBAR " Survey    Therapist reviewed FOTO scores for Elham Arteaga on 8/8/2022.   FOTO documents entered into [employer] - see Media section.    Limitation Score: 49%  Goal: 40%       TREATMENT   Treatment Time In: 1530  Treatment Time Out: 1540  Total Treatment time separate from Evaluation: 08 minutes    Elham received therapeutic exercises to develop strength, endurance, ROM, flexibility, posture and core stabilization for 08 minutes including:  Education to avoid forward flexion lumbar spine as often as possible.  Repeated standing lumbar extension 10-15 x every 15-30 mins during repetitive flexion or HH chores.  Lay prone on elbows 3-5 mins 3-5x/day    Home Exercises and Patient Education Provided    Education provided:   - HEP  - Pt/family was provided educational information, including: role of PT, role of pt/caregiver, goals for PT, POC, scheduling, and attendance policy.- pt verbalized understanding.    Written Home Exercises Provided: yes.  Exercises were reviewed and Elham was able to demonstrate them prior to the end of the session.  Elham demonstrated good  understanding of the education provided.     See EMR under Patient Instructions for exercises provided 8/8/2022.    Assessment   Elham is a 76 y.o. female referred to outpatient Physical Therapy with a medical diagnosis of Lumbar radiculopathy. Pt presents with low back pain consistent with cause from herniated lumbar disc. Low back pain, LE weakness, dec lat thigh sensation, and impaired ADLs is present. She is responsive to static and dynamic lumbar extension based ex.    Pt prognosis is Good.   Pt will benefit from skilled outpatient Physical Therapy to address the deficits stated above and in the chart below, provide pt/family education, and to maximize pt's level of independence.     Plan of care discussed with patient: Yes  Pt's spiritual, cultural and educational needs considered and patient is agreeable to the plan of care and goals as stated below:      Anticipated Barriers for therapy: chronic    Medical Necessity is demonstrated by the following  History  Co-morbidities and personal factors that may impact the plan of care Co-morbidities:   see below    Past Medical History:   Diagnosis Date    Acute esophageal obstruction     Asthma     seasonal    Celiac disease 07/2020    Complex tear of lateral meniscus of right knee as current injury 3/28/2017    Depression     Duodenal ulcer     without hemorrhage or perforation     Dysphagia, unspecified(787.20)     Eosinophilic esophagitis     Fibrocystic breast     Generalized osteoarthrosis, involving multiple sites     GERD (gastroesophageal reflux disease)     Hyperlipidemia     Hyperparathyroidism 2/24/2022    Hypothyroidism     Lipoma of unspecified site     Other specified cardiac dysrhythmias(427.89)     PONV (postoperative nausea and vomiting)     Rheumatoid arthritis     Screening for other and unspecified cardiovascular conditions     Sinus problem     Stricture and stenosis of esophagus     Trigger ring finger of right hand     Unspecified asthma(493.90)      Personal Factors:   age  lifestyle     high   Examination  Body Structures and Functions, activity limitations and participation restrictions that may impact the plan of care Body Regions:   back  lower extremities    Body Systems:    ROM  strength  motor control  motor learning    Participation Restrictions:   -impaired mobility  -impaired standing tolerance    Activity limitations:   Learning and applying knowledge  no deficits    General Tasks and Commands  no deficits    Communication  no deficits    Mobility  walking  driving (bike, car, motorcycle)    Self care  no deficits    Domestic Life  doing house work (cleaning house, washing dishes, laundry)  assisting others    Interactions/Relationships  no deficits    Life Areas  no deficits    Community and Social Life  community life         moderate   Clinical Presentation  evolving clinical presentation with changing clinical characteristics moderate   Decision Making/ Complexity Score: moderate     Goals:  Short Term Goals: 4 weeks   -Pt will demo lumbar spine AROM extension to 75% for improving mobility.  -Pt will demo bilateral LE strength to 4/5 grossly to demo dec nerve effected by disc herniation.    Long Term Goals: 8 weeks   -FOTO impairment score for lumbar spine to 40% for improved QOL.  -Pt able to interact with grandchildren with little difficulty.    Plan   Plan of care Certification: 8/8/2022 to 10/7/2022.    Outpatient Physical Therapy 2 times weekly for 8 weeks to include the following interventions: Aquatic Therapy, Cervical/Lumbar Traction, Electrical Stimulation IFC, Manual Therapy, Moist Heat/ Ice, Neuromuscular Re-ed, Patient Education, Self Care, Therapeutic Activities, Therapeutic Exercise, Ultrasound and dry needling.     Myriam Llyod, PT

## 2022-08-22 ENCOUNTER — DOCUMENTATION ONLY (OUTPATIENT)
Dept: REHABILITATION | Facility: HOSPITAL | Age: 77
End: 2022-08-22
Payer: MEDICARE

## 2022-08-22 NOTE — PROGRESS NOTES
"[facility]  PT Discharge Note    Name: Elham Arteaga  [facility] Number: [id number]    Therapy Diagnosis:        Encounter Diagnosis   Name Primary?    Lumbar radiculopathy        Physician: Darrell Condon MD    Physician Orders: PT Eval and Treat Post Surgical? No Eval and Treat Yes Type of Therapy Outpatient Therapy Location: Back   Medical Diagnosis from Referral: M54.16 (ICD-10-CM) - Lumbar radiculopathy  Evaluation Date: 8/8/2022    Date of Last visit: 8/8/2022  Total Visits Received: 1    No shows: 0  Call/cancels: 4    ASSESSMENT      Pt requests d/c after attending eval only.    Discharge reason: Other:  Received message requesting cancel to all appts. Pt reports that she "freezes" every time she is here. (Pt has been seen once in this clinic.)    Discharge FOTO Score: 49%    Goals:   Pt unable to reach goals after attending eval only.    PLAN   This patient is discharged from Physical Therapy.      Myriam Lloyd, PT       "

## 2022-08-27 PROBLEM — R07.9 CHEST PAIN: Status: ACTIVE | Noted: 2022-08-27

## 2022-08-30 ENCOUNTER — OFFICE VISIT (OUTPATIENT)
Dept: RHEUMATOLOGY | Facility: CLINIC | Age: 77
End: 2022-08-30
Payer: MEDICARE

## 2022-08-30 VITALS
DIASTOLIC BLOOD PRESSURE: 79 MMHG | BODY MASS INDEX: 27.2 KG/M2 | SYSTOLIC BLOOD PRESSURE: 149 MMHG | WEIGHT: 147.81 LBS | HEART RATE: 2 BPM | HEIGHT: 62 IN

## 2022-08-30 DIAGNOSIS — D80.4 IGM DEFICIENCY: ICD-10-CM

## 2022-08-30 DIAGNOSIS — E06.3 HASHIMOTO'S THYROIDITIS: ICD-10-CM

## 2022-08-30 DIAGNOSIS — R74.8 ELEVATED LIVER ENZYMES: ICD-10-CM

## 2022-08-30 DIAGNOSIS — M06.00 SERONEGATIVE RHEUMATOID ARTHRITIS: ICD-10-CM

## 2022-08-30 DIAGNOSIS — K90.41 NON-CELIAC GLUTEN SENSITIVITY: ICD-10-CM

## 2022-08-30 DIAGNOSIS — M54.12 CERVICAL RADICULOPATHY: ICD-10-CM

## 2022-08-30 DIAGNOSIS — M15.9 PRIMARY OSTEOARTHRITIS INVOLVING MULTIPLE JOINTS: ICD-10-CM

## 2022-08-30 DIAGNOSIS — M05.711 RHEUMATOID ARTHRITIS INVOLVING RIGHT SHOULDER WITH POSITIVE RHEUMATOID FACTOR: ICD-10-CM

## 2022-08-30 DIAGNOSIS — G89.4 CHRONIC PAIN SYNDROME: ICD-10-CM

## 2022-08-30 DIAGNOSIS — K21.9 GASTROESOPHAGEAL REFLUX DISEASE WITHOUT ESOPHAGITIS: Primary | ICD-10-CM

## 2022-08-30 PROCEDURE — 1101F PT FALLS ASSESS-DOCD LE1/YR: CPT | Mod: CPTII,S$GLB,, | Performed by: INTERNAL MEDICINE

## 2022-08-30 PROCEDURE — 3288F FALL RISK ASSESSMENT DOCD: CPT | Mod: CPTII,S$GLB,, | Performed by: INTERNAL MEDICINE

## 2022-08-30 PROCEDURE — 1101F PR PT FALLS ASSESS DOC 0-1 FALLS W/OUT INJ PAST YR: ICD-10-PCS | Mod: CPTII,S$GLB,, | Performed by: INTERNAL MEDICINE

## 2022-08-30 PROCEDURE — 99215 PR OFFICE/OUTPT VISIT, EST, LEVL V, 40-54 MIN: ICD-10-PCS | Mod: 25,S$GLB,, | Performed by: INTERNAL MEDICINE

## 2022-08-30 PROCEDURE — 1125F AMNT PAIN NOTED PAIN PRSNT: CPT | Mod: CPTII,S$GLB,, | Performed by: INTERNAL MEDICINE

## 2022-08-30 PROCEDURE — 99215 OFFICE O/P EST HI 40 MIN: CPT | Mod: 25,S$GLB,, | Performed by: INTERNAL MEDICINE

## 2022-08-30 PROCEDURE — 1125F PR PAIN SEVERITY QUANTIFIED, PAIN PRESENT: ICD-10-PCS | Mod: CPTII,S$GLB,, | Performed by: INTERNAL MEDICINE

## 2022-08-30 PROCEDURE — 3288F PR FALLS RISK ASSESSMENT DOCUMENTED: ICD-10-PCS | Mod: CPTII,S$GLB,, | Performed by: INTERNAL MEDICINE

## 2022-08-30 PROCEDURE — 99999 PR PBB SHADOW E&M-EST. PATIENT-LVL III: CPT | Mod: PBBFAC,,, | Performed by: INTERNAL MEDICINE

## 2022-08-30 PROCEDURE — 99999 PR PBB SHADOW E&M-EST. PATIENT-LVL III: ICD-10-PCS | Mod: PBBFAC,,, | Performed by: INTERNAL MEDICINE

## 2022-08-30 PROCEDURE — 96372 THER/PROPH/DIAG INJ SC/IM: CPT | Mod: S$GLB,,, | Performed by: INTERNAL MEDICINE

## 2022-08-30 PROCEDURE — 96372 PR INJECTION,THERAP/PROPH/DIAG2ST, IM OR SUBCUT: ICD-10-PCS | Mod: S$GLB,,, | Performed by: INTERNAL MEDICINE

## 2022-08-30 RX ORDER — SODIUM CHLORIDE 0.9 % (FLUSH) 0.9 %
10 SYRINGE (ML) INJECTION
Status: CANCELLED | OUTPATIENT
Start: 2022-08-30

## 2022-08-30 RX ORDER — KETOROLAC TROMETHAMINE 30 MG/ML
60 INJECTION, SOLUTION INTRAMUSCULAR; INTRAVENOUS
Status: COMPLETED | OUTPATIENT
Start: 2022-08-30 | End: 2022-08-30

## 2022-08-30 RX ORDER — DIPHENHYDRAMINE HYDROCHLORIDE 50 MG/ML
50 INJECTION INTRAMUSCULAR; INTRAVENOUS ONCE AS NEEDED
Status: CANCELLED | OUTPATIENT
Start: 2022-08-30

## 2022-08-30 RX ORDER — METHYLPREDNISOLONE SOD SUCC 125 MG
100 VIAL (EA) INJECTION
Status: CANCELLED | OUTPATIENT
Start: 2022-08-30

## 2022-08-30 RX ORDER — EPINEPHRINE 0.3 MG/.3ML
0.3 INJECTION SUBCUTANEOUS ONCE AS NEEDED
Status: CANCELLED | OUTPATIENT
Start: 2022-08-30

## 2022-08-30 RX ORDER — SUCRALFATE 1 G/1
1 TABLET ORAL 3 TIMES DAILY
Qty: 90 TABLET | Refills: 3 | Status: SHIPPED | OUTPATIENT
Start: 2022-08-30 | End: 2022-10-07

## 2022-08-30 RX ORDER — HEPARIN 100 UNIT/ML
500 SYRINGE INTRAVENOUS
Status: CANCELLED | OUTPATIENT
Start: 2022-08-30

## 2022-08-30 RX ORDER — TRAMADOL HYDROCHLORIDE 50 MG/1
50 TABLET ORAL EVERY 12 HOURS PRN
Qty: 60 TABLET | Refills: 2 | Status: SHIPPED | OUTPATIENT
Start: 2022-08-30 | End: 2023-01-06 | Stop reason: SDUPTHER

## 2022-08-30 RX ORDER — DIPHENHYDRAMINE HYDROCHLORIDE 50 MG/ML
25 INJECTION INTRAMUSCULAR; INTRAVENOUS
Status: CANCELLED | OUTPATIENT
Start: 2022-08-30

## 2022-08-30 RX ORDER — METHOCARBAMOL 750 MG/1
750 TABLET, FILM COATED ORAL 3 TIMES DAILY
Qty: 90 TABLET | Refills: 5 | Status: SHIPPED | OUTPATIENT
Start: 2022-08-30 | End: 2023-02-26

## 2022-08-30 RX ORDER — CYANOCOBALAMIN 1000 UG/ML
1000 INJECTION, SOLUTION INTRAMUSCULAR; SUBCUTANEOUS
Status: COMPLETED | OUTPATIENT
Start: 2022-08-30 | End: 2022-08-30

## 2022-08-30 RX ORDER — ACETAMINOPHEN 325 MG/1
650 TABLET ORAL
Status: CANCELLED | OUTPATIENT
Start: 2022-08-30

## 2022-08-30 RX ADMIN — CYANOCOBALAMIN 1000 MCG: 1000 INJECTION, SOLUTION INTRAMUSCULAR; SUBCUTANEOUS at 09:08

## 2022-08-30 RX ADMIN — KETOROLAC TROMETHAMINE 60 MG: 30 INJECTION, SOLUTION INTRAMUSCULAR; INTRAVENOUS at 09:08

## 2022-08-30 NOTE — PROGRESS NOTES
Subjective:       Patient ID: Elham Arteaga is a 76 y.o. female.    Chief Complaint: Disease Management    Follow up:76 year old female who presents to clinic for follow up on MCTD and seronegative RA. She was on Kevzara  and noticed hair loss and  it caused cramping so she stopped. No injection site reactions or side effects noted. She is not sure if she has noticed any improvement in her peripheral joint pain. She had Covid in Jan 26 2022 and recently admitted  r/o caridac ended up with gerd. She has pain and swelling in her hands specifically PIPs, wrists, and shoulders. She underwent R rotator cuff surgery Jan 17th Dr. Batista and is recovering fairly well; currently in PT. She had a fall on the R side in June 2021 that is when initial injury occurred and she completed PT for months without improvement.     She is tolerating change from levothyroxine to armor thyroid. Recent TSH/T4 is normal. She is due for thyroid US to monitor nodule. She has history of elevated PTH and bordeline calcium.    She was taking norco and tylenol arthritis PRN for pain following surgery, but she is no longer taking this.      Current tx:  1. Kevzara- stopped  2. naproxen    Prior tx:  1. Plaquenil  2. mobic   3. Rinvoq  4. Xeljanz  5.kevzara        Review of Systems   Constitutional:  Positive for activity change and fatigue. Negative for appetite change, chills and fever.   Eyes:  Negative for visual disturbance.   Respiratory:  Negative for cough and shortness of breath.    Cardiovascular:  Negative for chest pain, palpitations and leg swelling.   Gastrointestinal:  Negative for abdominal pain, constipation, diarrhea, nausea and vomiting.   Musculoskeletal:  Positive for arthralgias, back pain, joint swelling and myalgias.   Allergic/Immunologic: Positive for immunocompromised state.   Neurological:  Positive for weakness. Negative for dizziness, light-headedness and headaches.   Psychiatric/Behavioral:  Negative for dysphoric  mood. The patient is not nervous/anxious.        Objective:     Vitals:    22 0824   BP: (!) 149/79   Pulse: (!) 2       Past Medical History:   Diagnosis Date    Acute esophageal obstruction     Asthma     seasonal    Celiac disease 2020    Complex tear of lateral meniscus of right knee as current injury 3/28/2017    Depression     Duodenal ulcer     without hemorrhage or perforation     Dysphagia, unspecified(787.20)     Eosinophilic esophagitis     Fibrocystic breast     Generalized osteoarthrosis, involving multiple sites     GERD (gastroesophageal reflux disease)     Hyperlipidemia     Hyperparathyroidism 2022    Hypothyroidism     Lipoma of unspecified site     Other specified cardiac dysrhythmias(427.89)     PONV (postoperative nausea and vomiting)     Rheumatoid arthritis     Screening for other and unspecified cardiovascular conditions     Sinus problem     Stricture and stenosis of esophagus     Trigger ring finger of right hand     Unspecified asthma(493.90)      Past Surgical History:   Procedure Laterality Date    214.9      ADENOIDECTOMY      ANKLE SURGERY  2009    cyst, left     SECTION, LOW TRANSVERSE      times 1    CHOLECYSTECTOMY      ESOPHAGOGASTRODUODENOSCOPY N/A 2019    Procedure: EGD (ESOPHAGOGASTRODUODENOSCOPY);  Surgeon: Brandon Negron MD;  Location: [facility];  Service: Endoscopy;  Laterality: N/A;    HAND SURGERY      HERNIA REPAIR      left inguinal    HYSTERECTOMY      OOPHORECTOMY      SHOULDER SURGERY  2022    dr ch    TONSILLECTOMY      UPPER GASTROINTESTINAL ENDOSCOPY      VAGINAL DELIVERY      times 2          Physical Exam   Eyes: Right conjunctiva is not injected. Left conjunctiva is not injected.   Neck: No JVD present. No thyromegaly present.   Cardiovascular: Normal rate and regular rhythm. Exam reveals no decreased pulses.   Pulmonary/Chest: Effort normal.   Musculoskeletal:      Right shoulder:  Tenderness present.      Left shoulder: Normal.      Right elbow: Normal.      Left elbow: Normal.      Right wrist: Swelling and tenderness present.      Left wrist: Normal.      Left knee: Normal.   Lymphadenopathy:     She has no cervical adenopathy.   Neurological: She displays weakness (right hand). Gait normal.   Skin: No rash noted.   Psychiatric: Mood and affect normal.       Right Side Rheumatological Exam     Examination finds the elbow, 4th MCP and 5th MCP normal.    The patient is tender to palpation of the shoulder, wrist, 1st PIP, 1st MCP, 2nd PIP, 2nd MCP, 3rd PIP, 3rd MCP, 4th PIP, 5th PIP and 1st CMC    She has swelling of the wrist, 1st PIP, 1st MCP, 2nd PIP, 2nd MCP, 3rd PIP, 3rd MCP, 4th PIP and 5th PIP    Left Side Rheumatological Exam     Examination finds the shoulder, elbow, wrist, knee, 2nd MCP, 3rd MCP, 4th MCP and 5th MCP normal.    The patient is tender to palpation of the 1st PIP, 2nd PIP, 3rd PIP, 4th PIP, 5th PIP and 1st CMC.    She has swelling of the 1st PIP, 2nd PIP, 3rd PIP, 4th PIP and 5th PIP      Back/Neck Exam   Tenderness Right paramedian tenderness of the Lower L-Spine, Upper L-Spine, Lower C-Spine and Upper C-Spine.Left paramedian tenderness of the Lower L-Spine, Upper L-Spine, Upper C-Spine and Lower C-Spine.        Results for orders placed or performed during the hospital encounter of 08/27/22   CBC auto differential   Result Value Ref Range    WBC 7.19 3.90 - 12.70 K/uL    RBC 4.47 4.00 - 5.40 M/uL    Hemoglobin 12.8 12.0 - 16.0 g/dL    Hematocrit 39.5 37.0 - 48.5 %    MCV 88 82 - 98 fL    MCH 28.6 27.0 - 31.0 pg    MCHC 32.4 32.0 - 36.0 g/dL    RDW 12.8 11.5 - 14.5 %    Platelets 264 150 - 450 K/uL    MPV 9.0 (L) 9.2 - 12.9 fL    Immature Granulocytes 0.4 0.0 - 0.5 %    Gran # (ANC) 4.2 1.8 - 7.7 K/uL    Immature Grans (Abs) 0.03 0.00 - 0.04 K/uL    Lymph # 2.1 1.0 - 4.8 K/uL    Mono # 0.5 0.3 - 1.0 K/uL    Eos # 0.3 0.0 - 0.5 K/uL    Baso # 0.05 0.00 - 0.20 K/uL     nRBC 0 0 /100 WBC    Gran % 58.9 38.0 - 73.0 %    Lymph % 28.9 18.0 - 48.0 %    Mono % 7.1 4.0 - 15.0 %    Eosinophil % 4.0 0.0 - 8.0 %    Basophil % 0.7 0.0 - 1.9 %    Differential Method Automated    Comprehensive metabolic panel   Result Value Ref Range    Sodium 138 136 - 145 mmol/L    Potassium 4.1 3.5 - 5.1 mmol/L    Chloride 103 95 - 110 mmol/L    CO2 24 22 - 31 mmol/L    Glucose 113 (H) 70 - 110 mg/dL    BUN 19 (H) 7 - 18 mg/dL    Creatinine 0.75 0.50 - 1.40 mg/dL    Calcium 9.8 8.4 - 10.2 mg/dL    Total Protein 7.0 6.0 - 8.4 g/dL    Albumin 4.1 3.5 - 5.2 g/dL    Total Bilirubin 0.6 0.2 - 1.3 mg/dL    Alkaline Phosphatase 131 38 - 145 U/L    AST 32 14 - 36 U/L    ALT 27 0 - 35 U/L    Anion Gap 11 8 - 16 mmol/L    eGFR >60 >60 mL/min/1.73 m^2   NT-Pro Natriuretic Peptide   Result Value Ref Range    NT-proBNP 52 5 - 1800 pg/mL   Troponin   Result Value Ref Range    Troponin I <0.012 0.012 - 0.034 ng/mL   Troponin in 2 Hours   Result Value Ref Range    Troponin I <0.012 0.012 - 0.034 ng/mL   Urinalysis, Reflex to Urine Culture Urine, Clean Catch    Specimen: Urine   Result Value Ref Range    Specimen UA Urine, Clean Catch     Color, UA Yellow Yellow, Straw, Claudia    Appearance, UA Clear Clear    pH, UA 6.5 5.0 - 8.0    Specific Gravity, UA 1.010 1.005 - 1.030    Protein, UA Negative Negative    Glucose, UA Negative Negative    Ketones, UA Negative Negative    Bilirubin (UA) Negative Negative    Occult Blood UA Negative Negative    Nitrite, UA Negative Negative    Urobilinogen, UA 0.2 <2.0 EU/dL    Leukocytes, UA Negative Negative   D dimer, quantitative   Result Value Ref Range    D-Dimer 0.46 <0.50 ug/mL FEU   COVID-19 Rapid Screening   Result Value Ref Range    SARS-CoV-2 RNA, Amplification, Qual Negative Negative   Nuclear Stress - Cardiology Interpreted   Result Value Ref Range    85% Max Predicted      Max Predicted      OHS CV CPX PATIENT IS MALE 0.0     [facility] CV CPX PATIENT IS FEMALE 1.0     HR  at rest 68 bpm    Systolic blood pressure 148 mmHg    Diastolic blood pressure 80 mmHg    RPP 10,064     Peak  bpm    Peak Systolic  mmHg    Peak Diatolic BP 80 mmHg    Peak RPP 22,644     % Max HR Achieved 111    Echo   Result Value Ref Range    BSA 1.71 m2    TDI SEPTAL 0.08 m/s    LV LATERAL E/E' RATIO 11.75 m/s    LV SEPTAL E/E' RATIO 11.75 m/s    LA WIDTH 4.78 cm    Left Ventricular Outflow Tract Mean Velocity 0.74 cm/s    Left Ventricular Outflow Tract Mean Gradient 3.00 mmHg    TDI LATERAL 0.08 m/s    PV PEAK VELOCITY 0.87 cm/s    LVIDd 3.62 3.5 - 6.0 cm    IVS 1.15 (A) 0.6 - 1.1 cm    Posterior Wall 1.05 0.6 - 1.1 cm    LVIDs 2.28 2.1 - 4.0 cm    FS 37 28 - 44 %    LA volume 63.62 cm3    STJ 2.47 cm    LV mass 125.15 g    LA size 3.70 cm    RVDD 2.72 cm    TAPSE 1.78 cm    RV S' 14 cm/s    Left Ventricle Relative Wall Thickness 0.58 cm    AV mean gradient 4 mmHg    AV valve area 2.36 cm2    AV Velocity Ratio 0.74     AV index (prosthetic) 0.75     MV mean gradient 2 mmHg    MV valve area p 1/2 method 2.59 cm2    MV valve area by continuity eq 2.57 cm2    E/A ratio 1.21     Mean e' 0.08 m/s    E wave deceleration time 195.00 msec    LVOT diameter 2.00 cm    LVOT area 3.1 cm2    LVOT peak rory 1.08 m/s    LVOT peak VTI 25.00 cm    Ao peak rory 1.45 m/s    Ao VTI 33.2 cm    Mr max rory 4.23 m/s    LVOT stroke volume 78.50 cm3    AV peak gradient 8 mmHg    MV peak gradient 4 mmHg    E/E' ratio 11.75 m/s    MV Peak E Rory 0.94 m/s    TR Max Rory 1.52 m/s    MV VTI 30.5 cm    MV stenosis pressure 1/2 time 85.00 ms    MV Peak A Rory 0.78 m/s    LV Systolic Volume 17.70 mL    LV Systolic Volume Index 10.5 mL/m2    LV Diastolic Volume 55.20 mL    LV Diastolic Volume Index 32.86 mL/m2    LA Volume Index 37.9 mL/m2    LV Mass Index 74 g/m2    RA Major Axis 4.13 cm    Left Atrium Minor Axis 4.84 cm    Left Atrium Major Axis 3.76 cm    Triscuspid Valve Regurgitation Peak Gradient 9 mmHg    RA Width 3.24 cm    Right  Atrial Pressure (from IVC) 3 mmHg    EF 60 %    TV rest pulmonary artery pressure 12 mmHg     reviewed labs with patient during this visit       Assessment:       1. Gastroesophageal reflux disease without esophagitis    2. Seronegative rheumatoid arthritis    3. Elevated liver enzymes    4. Hashimoto's thyroiditis    5. Non-celiac gluten sensitivity    6. Cervical radiculopathy    7. Rheumatoid arthritis involving right shoulder with positive rheumatoid factor    8. Primary osteoarthritis involving multiple joints    9. IgM deficiency    10. Chronic pain syndrome              Plan:       Gastroesophageal reflux disease without esophagitis  -     sucralfate (CARAFATE) 1 gram tablet; Take 1 tablet (1 g total) by mouth 3 (three) times daily.  Dispense: 90 tablet; Refill: 3    Seronegative rheumatoid arthritis  -     ketorolac injection 60 mg  -     cyanocobalamin injection 1,000 mcg    Elevated liver enzymes  -     CBC Auto Differential; Future; Expected date: 08/30/2022  -     Comprehensive Metabolic Panel; Future; Expected date: 08/30/2022  -     Sedimentation rate; Future; Expected date: 08/30/2022  -     C-Reactive Protein; Future; Expected date: 08/30/2022  -     Rheumatoid Factor; Future; Expected date: 08/30/2022  -     Cyclic Citrullinated Peptide Antibody, IgG; Future; Expected date: 08/30/2022  -     ketorolac injection 60 mg  -     cyanocobalamin injection 1,000 mcg    Hashimoto's thyroiditis  -     CBC Auto Differential; Future; Expected date: 08/30/2022  -     Comprehensive Metabolic Panel; Future; Expected date: 08/30/2022  -     Sedimentation rate; Future; Expected date: 08/30/2022  -     C-Reactive Protein; Future; Expected date: 08/30/2022  -     Rheumatoid Factor; Future; Expected date: 08/30/2022  -     Cyclic Citrullinated Peptide Antibody, IgG; Future; Expected date: 08/30/2022  -     ketorolac injection 60 mg  -     cyanocobalamin injection 1,000 mcg    Non-celiac gluten sensitivity  -     CBC  Auto Differential; Future; Expected date: 08/30/2022  -     Comprehensive Metabolic Panel; Future; Expected date: 08/30/2022  -     Sedimentation rate; Future; Expected date: 08/30/2022  -     C-Reactive Protein; Future; Expected date: 08/30/2022  -     Rheumatoid Factor; Future; Expected date: 08/30/2022  -     Cyclic Citrullinated Peptide Antibody, IgG; Future; Expected date: 08/30/2022  -     ketorolac injection 60 mg  -     cyanocobalamin injection 1,000 mcg    Cervical radiculopathy  -     CBC Auto Differential; Future; Expected date: 08/30/2022  -     Comprehensive Metabolic Panel; Future; Expected date: 08/30/2022  -     Sedimentation rate; Future; Expected date: 08/30/2022  -     C-Reactive Protein; Future; Expected date: 08/30/2022  -     Rheumatoid Factor; Future; Expected date: 08/30/2022  -     Cyclic Citrullinated Peptide Antibody, IgG; Future; Expected date: 08/30/2022  -     methocarbamoL (ROBAXIN) 750 MG Tab; Take 1 tablet (750 mg total) by mouth 3 (three) times daily.  Dispense: 90 tablet; Refill: 5    Rheumatoid arthritis involving right shoulder with positive rheumatoid factor  -     CBC Auto Differential; Future; Expected date: 08/30/2022  -     Comprehensive Metabolic Panel; Future; Expected date: 08/30/2022  -     Sedimentation rate; Future; Expected date: 08/30/2022  -     C-Reactive Protein; Future; Expected date: 08/30/2022  -     Rheumatoid Factor; Future; Expected date: 08/30/2022  -     Cyclic Citrullinated Peptide Antibody, IgG; Future; Expected date: 08/30/2022  -     ketorolac injection 60 mg  -     cyanocobalamin injection 1,000 mcg  -     methocarbamoL (ROBAXIN) 750 MG Tab; Take 1 tablet (750 mg total) by mouth 3 (three) times daily.  Dispense: 90 tablet; Refill: 5    Primary osteoarthritis involving multiple joints  -     CBC Auto Differential; Future; Expected date: 08/30/2022  -     Comprehensive Metabolic Panel; Future; Expected date: 08/30/2022  -     Sedimentation rate; Future;  Expected date: 08/30/2022  -     C-Reactive Protein; Future; Expected date: 08/30/2022  -     Rheumatoid Factor; Future; Expected date: 08/30/2022  -     Cyclic Citrullinated Peptide Antibody, IgG; Future; Expected date: 08/30/2022  -     ketorolac injection 60 mg  -     cyanocobalamin injection 1,000 mcg    IgM deficiency  -     CBC Auto Differential; Future; Expected date: 08/30/2022  -     Comprehensive Metabolic Panel; Future; Expected date: 08/30/2022  -     Sedimentation rate; Future; Expected date: 08/30/2022  -     C-Reactive Protein; Future; Expected date: 08/30/2022  -     Rheumatoid Factor; Future; Expected date: 08/30/2022  -     Cyclic Citrullinated Peptide Antibody, IgG; Future; Expected date: 08/30/2022  -     ketorolac injection 60 mg  -     cyanocobalamin injection 1,000 mcg    Chronic pain syndrome  -     traMADoL (ULTRAM) 50 mg tablet; Take 1 tablet (50 mg total) by mouth every 12 (twelve) hours as needed for Pain.  Dispense: 60 tablet; Refill: 2    Other orders  -     abatacept (with maltose) (ORENCIA) 750 mg in sodium chloride 0.9% 100 mL IVPB  -     abatacept (with maltose) (ORENCIA) 750 mg in sodium chloride 0.9% 100 mL IVPB  -     acetaminophen tablet 650 mg  -     diphenhydrAMINE injection 25 mg  -     methylPREDNISolone sodium succinate injection 100 mg  -     heparin, porcine (PF) 100 unit/mL injection flush 500 Units  -     sodium chloride 0.9% flush 10 mL  -     sodium chloride 0.9% 100 mL flush bag  -     EPINEPHrine (EPIPEN) 0.3 mg/0.3 mL pen injection 0.3 mg  -     diphenhydrAMINE injection 50 mg  -     hydrocortisone sodium succinate injection 100 mg        Assessment:  76 year old female with  MCTD MATEUSZ 1:320 speckled, seronegative RA, homogenous, elevated CRP  --elevated PTH  --MTHFR  --NCGS  --Osteoarthritis  --sulfa allergy  --hyperlipidemia, unable to tolerate statins, on lovaza    Plan:  1. toradol 60, b12 given today  2. X-ray cervical spine, lumbar, SI joints. May need MRI  cervical and pain management referral  3. Add tramadol,   4. Thyroid US ordered. Cont armour thyroid 60 mg  5. Start iv orencia

## 2022-08-30 NOTE — PROGRESS NOTES
2 Patient ID's verified and allergies reviewed     Administered B12, 1000mcg, 1cc in Right Upper Gluteal    Administered Ketorolac 60mg/ml, 2cc's in Left Upper Gluteal    Patient tolerated injections well and left the facility in stable condition

## 2022-10-06 ENCOUNTER — TELEPHONE (OUTPATIENT)
Dept: RHEUMATOLOGY | Facility: CLINIC | Age: 77
End: 2022-10-06
Payer: MEDICARE

## 2022-10-06 DIAGNOSIS — M35.1 MCTD (MIXED CONNECTIVE TISSUE DISEASE): ICD-10-CM

## 2022-10-06 DIAGNOSIS — M15.9 PRIMARY OSTEOARTHRITIS INVOLVING MULTIPLE JOINTS: ICD-10-CM

## 2022-10-06 DIAGNOSIS — M05.711 RHEUMATOID ARTHRITIS INVOLVING RIGHT SHOULDER WITH POSITIVE RHEUMATOID FACTOR: ICD-10-CM

## 2022-10-06 DIAGNOSIS — M06.00 SERONEGATIVE RHEUMATOID ARTHRITIS: Primary | ICD-10-CM

## 2022-10-06 NOTE — TELEPHONE ENCOUNTER
Received message from Prisca Myles RN, with the pre-service department about Orencia infusion. She received an approval letter from [employer]    Appeal #: [id number]. Approved from 9/6/22 to 12/31/22. Uploaded appeal letter under media- miscellaneous documents    Routing to SAILAJA Welsh, at the infusion center for set-up.

## 2022-10-06 NOTE — TELEPHONE ENCOUNTER
Received message from Prisca Myles RN, with the pre-service department about orencia infusion denial. See note below.    Completed appeal letter and faxed to insurance at [phone] as an urgent request. Will check status of appeal on 10/11 if no response    Pt also due for labs- TB, Hep C, and Hep B. Last completed 4/5/21. Will order.    Called pt. Updated her with the information above and notified her that the above labs are needed. Pt has an upcoming appt in office with Mohini and pending labs. Advised pt to complete labs closer to appt date

## 2022-10-20 DIAGNOSIS — E06.3 HASHIMOTO'S THYROIDITIS: ICD-10-CM

## 2022-10-20 RX ORDER — THYROID 60 MG/1
60 TABLET ORAL
Qty: 90 TABLET | Refills: 1 | Status: SHIPPED | OUTPATIENT
Start: 2022-10-20 | End: 2023-05-02 | Stop reason: SDUPTHER

## 2022-10-20 NOTE — PROGRESS NOTES
"  [facility]  Clinic Consult     Consult Requested By: CHET Ellison Jr, MD, Mariola Mathews NP        SUBJECTIVE:     Chief Complaint:   Chief Complaint   Patient presents with    Cervical Spine Pain (C-spine)     Patient presents to clinic with complaint of R sided neck pain that radiates into the forearm.  Patient states she has numbness in tingling in the R hand/fingers. Patient states she had a fall on her R shoulder 07/2021, saw 3 different Ortho docs, and eventually had shoulder surgery.  Patient denies headaches or blurry vision.  Patient also complaining of LBP.        History of Present Illness:  Elham Arteaga is a 76 y.o. female           Review of patient's allergies indicates:   Allergen Reactions    Aciphex [rabeprazole] Other (See Comments)    Amlodipine Other (See Comments)    Ceftin [cefuroxime axetil] Nausea Only    Codeine Anaphylaxis and Nausea And Vomiting    Crestor [rosuvastatin] Other (See Comments)    Lactose Other (See Comments)    Lisinopril Other (See Comments)    Promethazine Other (See Comments)    Protonix [pantoprazole] Other (See Comments)    Prozac [fluoxetine] Other (See Comments)    Wellbutrin [bupropion hcl] Other (See Comments)    Eggs [egg derived] Other (See Comments)     "scarring esophagus"    Gluten protein     Kevzara [sarilumab] Other (See Comments)     Hair fell out and GI upset    Mobic [meloxicam]      Watery eyes, vaginal itching    Morphine Other (See Comments)     Hallucinations, angry, violent    Penicillins Hives    Plaquenil [hydroxychloroquine] Hives     Broke out in hives and whelps all over.    Soy     Sulfa (sulfonamide antibiotics) Other (See Comments)     Told no Sulfa drugs    Wheat containing prod     Xeljanz [tofacitinib]      Dyspnea, change in thought processes.     Elavil [amitriptyline] Other (See Comments)     nightmares       Past Medical History:   Diagnosis Date    Acute esophageal obstruction     Asthma  "    seasonal    Celiac disease 2020    Complex tear of lateral meniscus of right knee as current injury 3/28/2017    Depression     Duodenal ulcer     without hemorrhage or perforation     Dysphagia, unspecified(787.20)     Eosinophilic esophagitis     Fibrocystic breast     Generalized osteoarthrosis, involving multiple sites     GERD (gastroesophageal reflux disease)     Hyperlipidemia     Hyperparathyroidism 2022    Hypothyroidism     Lipoma of unspecified site     Other specified cardiac dysrhythmias(427.89)     PONV (postoperative nausea and vomiting)     Rheumatoid arthritis     Screening for other and unspecified cardiovascular conditions     Sinus problem     Stricture and stenosis of esophagus     Trigger ring finger of right hand     Unspecified asthma(493.90)      Past Surgical History:   Procedure Laterality Date    214.9      ADENOIDECTOMY      ANKLE SURGERY  2009    cyst, left     SECTION, LOW TRANSVERSE      times 1    CHOLECYSTECTOMY      ESOPHAGOGASTRODUODENOSCOPY N/A 2019    Procedure: EGD (ESOPHAGOGASTRODUODENOSCOPY);  Surgeon: Brandon Negron MD;  Location: [facility];  Service: Endoscopy;  Laterality: N/A;    HAND SURGERY      HERNIA REPAIR      left inguinal    HYSTERECTOMY      OOPHORECTOMY      SHOULDER SURGERY  2022    dr ch    TONSILLECTOMY      UPPER GASTROINTESTINAL ENDOSCOPY      VAGINAL DELIVERY      times 2     Family History   Problem Relation Age of Onset    Cancer Mother     Alcohol abuse Father      Social History     Tobacco Use    Smoking status: Never    Smokeless tobacco: Never   Substance Use Topics    Alcohol use: Yes     Comment: occasionally    Drug use: Never              OBJECTIVE:     Vital Signs (Most Recent):  Pulse: 104 (22 1509)  Resp: 18 (22 1509)  BP: 128/75 (22 1509)    Physical Exam:      General: well developed, well nourished, no distress. .  Mental Status: Awake, Alert, Oriented x 4  Language: No  aphasia  Speech: No dysarthria  Head: normocephalic, atraumatic.  Neck: trachea midline, no JVD   Cardiovascular: no LE edema  Pulmonary: normal respirations, no signs of respiratory distress  Abdomen: soft, non-distended  Skin: Skin is warm, dry and intact.    Motor Strength:  No abnormal movements seen.     Strength  Deltoids Triceps Biceps Wrist Extension Wrist Flexion Hand  Interossei     Upper: R 5/5 5/5 5/5 5/5 5/5 5/5 5/5      L 5/5 5/5 5/5 5/5 5/5 5/5 5/5       Iliopsoas Quadriceps Knee  Flexion Tibialis  anterior Gastro- cnemius EHL  Foot Eversion Foot inversion   Lower: R 5/5 5/5 5/5 5/5 5/5 5/5 5/5 5/5 5/5    L 5/5 5/5 5/5 5/5 5/5 5/5 5/5 5/5 5/5     SILT,PP      DTR's: 2 + and symmetric in UE and LE  [facility]: absent  Clonus: absent  Babinski: absent    Gait: normal    Tandem Gait: No difficulty           Able to walk on heels & toes       Pain on Hip ROM: Negative  Straight leg raise: negative bilaterally     SI Joint tenderness: Negative bilaterally   ENRRIQUE: Negative bilaterally        Diagnostic Results:  I have independently reviewed the following imaging:  MRI: Reviewed  r    Impression:     1. No significant spinal canal stenosis.  2. Mild to moderate left and mild right neural foraminal stenosis at C5-6.  3. Mild right neural foraminal stenosis at C3-4.  4. Other levels as above.        Electronically signed by: Luis Alberto Vargas MD  Date:                                            07/21/2022  Time:                                           12:38      ASSESSMENT/PLAN:     75 yo female with neck pain and radiculopathy  Intact  Cervical MRI with mild/moderate spondylosis  Without cord compression   No myelopathy  Reviewed goals, tx  Conservative tx    We discussed non-surgical treatment options.  We discussed medical management and referral options including anti-inflammatories, muscle relaxants, narcotics and neuropathic pain medications.  We discussed physical therapy for neck strengthening and  flexibility and the possibility of cervical traction.  We talked about injection therapy for both treatment and diagnosis.                   Darrell Condon MD  Neurosurgery

## 2022-10-20 NOTE — TELEPHONE ENCOUNTER
Pharmacy requesting refill on [place] Thyroid 60mg  Pt's LOV 08/30/2022  Pt's NOV 01/06/2023  Medication pending

## 2023-01-06 ENCOUNTER — OFFICE VISIT (OUTPATIENT)
Dept: RHEUMATOLOGY | Facility: CLINIC | Age: 78
End: 2023-01-06
Payer: MEDICARE

## 2023-01-06 VITALS
SYSTOLIC BLOOD PRESSURE: 142 MMHG | HEART RATE: 96 BPM | WEIGHT: 148 LBS | DIASTOLIC BLOOD PRESSURE: 68 MMHG | BODY MASS INDEX: 27.23 KG/M2 | HEIGHT: 62 IN

## 2023-01-06 DIAGNOSIS — M85.80 OSTEOPENIA, UNSPECIFIED LOCATION: ICD-10-CM

## 2023-01-06 DIAGNOSIS — G89.4 CHRONIC PAIN SYNDROME: ICD-10-CM

## 2023-01-06 DIAGNOSIS — E06.3 HASHIMOTO'S THYROIDITIS: ICD-10-CM

## 2023-01-06 DIAGNOSIS — M35.1 MCTD (MIXED CONNECTIVE TISSUE DISEASE): Primary | ICD-10-CM

## 2023-01-06 DIAGNOSIS — M06.00 SERONEGATIVE RHEUMATOID ARTHRITIS: ICD-10-CM

## 2023-01-06 DIAGNOSIS — Z78.0 ASYMPTOMATIC POSTMENOPAUSAL STATE: ICD-10-CM

## 2023-01-06 PROCEDURE — 99999 PR PBB SHADOW E&M-EST. PATIENT-LVL IV: ICD-10-PCS | Mod: PBBFAC,,, | Performed by: PHYSICIAN ASSISTANT

## 2023-01-06 PROCEDURE — 3288F PR FALLS RISK ASSESSMENT DOCUMENTED: ICD-10-PCS | Mod: CPTII,S$GLB,, | Performed by: PHYSICIAN ASSISTANT

## 2023-01-06 PROCEDURE — 96372 PR INJECTION,THERAP/PROPH/DIAG2ST, IM OR SUBCUT: ICD-10-PCS | Mod: S$GLB,,, | Performed by: PHYSICIAN ASSISTANT

## 2023-01-06 PROCEDURE — 99214 OFFICE O/P EST MOD 30 MIN: CPT | Mod: 25,S$GLB,, | Performed by: PHYSICIAN ASSISTANT

## 2023-01-06 PROCEDURE — 99999 PR PBB SHADOW E&M-EST. PATIENT-LVL IV: CPT | Mod: PBBFAC,,, | Performed by: PHYSICIAN ASSISTANT

## 2023-01-06 PROCEDURE — 1160F RVW MEDS BY RX/DR IN RCRD: CPT | Mod: CPTII,S$GLB,, | Performed by: PHYSICIAN ASSISTANT

## 2023-01-06 PROCEDURE — 3288F FALL RISK ASSESSMENT DOCD: CPT | Mod: CPTII,S$GLB,, | Performed by: PHYSICIAN ASSISTANT

## 2023-01-06 PROCEDURE — 3077F PR MOST RECENT SYSTOLIC BLOOD PRESSURE >= 140 MM HG: ICD-10-PCS | Mod: CPTII,S$GLB,, | Performed by: PHYSICIAN ASSISTANT

## 2023-01-06 PROCEDURE — 1125F AMNT PAIN NOTED PAIN PRSNT: CPT | Mod: CPTII,S$GLB,, | Performed by: PHYSICIAN ASSISTANT

## 2023-01-06 PROCEDURE — 1160F PR REVIEW ALL MEDS BY PRESCRIBER/CLIN PHARMACIST DOCUMENTED: ICD-10-PCS | Mod: CPTII,S$GLB,, | Performed by: PHYSICIAN ASSISTANT

## 2023-01-06 PROCEDURE — 99214 PR OFFICE/OUTPT VISIT, EST, LEVL IV, 30-39 MIN: ICD-10-PCS | Mod: 25,S$GLB,, | Performed by: PHYSICIAN ASSISTANT

## 2023-01-06 PROCEDURE — 1159F PR MEDICATION LIST DOCUMENTED IN MEDICAL RECORD: ICD-10-PCS | Mod: CPTII,S$GLB,, | Performed by: PHYSICIAN ASSISTANT

## 2023-01-06 PROCEDURE — 1101F PR PT FALLS ASSESS DOC 0-1 FALLS W/OUT INJ PAST YR: ICD-10-PCS | Mod: CPTII,S$GLB,, | Performed by: PHYSICIAN ASSISTANT

## 2023-01-06 PROCEDURE — 3077F SYST BP >= 140 MM HG: CPT | Mod: CPTII,S$GLB,, | Performed by: PHYSICIAN ASSISTANT

## 2023-01-06 PROCEDURE — 3078F DIAST BP <80 MM HG: CPT | Mod: CPTII,S$GLB,, | Performed by: PHYSICIAN ASSISTANT

## 2023-01-06 PROCEDURE — 1101F PT FALLS ASSESS-DOCD LE1/YR: CPT | Mod: CPTII,S$GLB,, | Performed by: PHYSICIAN ASSISTANT

## 2023-01-06 PROCEDURE — 3078F PR MOST RECENT DIASTOLIC BLOOD PRESSURE < 80 MM HG: ICD-10-PCS | Mod: CPTII,S$GLB,, | Performed by: PHYSICIAN ASSISTANT

## 2023-01-06 PROCEDURE — 1159F MED LIST DOCD IN RCRD: CPT | Mod: CPTII,S$GLB,, | Performed by: PHYSICIAN ASSISTANT

## 2023-01-06 PROCEDURE — 96372 THER/PROPH/DIAG INJ SC/IM: CPT | Mod: S$GLB,,, | Performed by: PHYSICIAN ASSISTANT

## 2023-01-06 PROCEDURE — 1125F PR PAIN SEVERITY QUANTIFIED, PAIN PRESENT: ICD-10-PCS | Mod: CPTII,S$GLB,, | Performed by: PHYSICIAN ASSISTANT

## 2023-01-06 RX ORDER — KETOROLAC TROMETHAMINE 30 MG/ML
60 INJECTION, SOLUTION INTRAMUSCULAR; INTRAVENOUS
Status: COMPLETED | OUTPATIENT
Start: 2023-01-06 | End: 2023-01-06

## 2023-01-06 RX ORDER — CYANOCOBALAMIN 1000 UG/ML
1000 INJECTION, SOLUTION INTRAMUSCULAR; SUBCUTANEOUS
Status: COMPLETED | OUTPATIENT
Start: 2023-01-06 | End: 2023-01-06

## 2023-01-06 RX ORDER — POLYETHYLENE GLYCOL 3350, SODIUM SULFATE ANHYDROUS, SODIUM BICARBONATE, SODIUM CHLORIDE, POTASSIUM CHLORIDE 236; 22.74; 6.74; 5.86; 2.97 G/4L; G/4L; G/4L; G/4L; G/4L
POWDER, FOR SOLUTION ORAL
COMMUNITY
Start: 2022-11-18 | End: 2023-04-17

## 2023-01-06 RX ORDER — SODIUM PICOSULFATE, MAGNESIUM OXIDE, AND ANHYDROUS CITRIC ACID 10; 3.5; 12 MG/160ML; G/160ML; G/160ML
LIQUID ORAL
COMMUNITY
Start: 2022-11-14 | End: 2023-05-02

## 2023-01-06 RX ADMIN — CYANOCOBALAMIN 1000 MCG: 1000 INJECTION, SOLUTION INTRAMUSCULAR; SUBCUTANEOUS at 09:01

## 2023-01-06 RX ADMIN — KETOROLAC TROMETHAMINE 60 MG: 30 INJECTION, SOLUTION INTRAMUSCULAR; INTRAVENOUS at 09:01

## 2023-01-06 ASSESSMENT — ROUTINE ASSESSMENT OF PATIENT INDEX DATA (RAPID3)
TOTAL RAPID3 SCORE: 2.83
PSYCHOLOGICAL DISTRESS SCORE: 2.2
MDHAQ FUNCTION SCORE: 0.9
FATIGUE SCORE: 1.1
PAIN SCORE: 2.5
PATIENT GLOBAL ASSESSMENT SCORE: 3

## 2023-01-06 NOTE — PROGRESS NOTES
Subjective:       Patient ID: Elham Arteaga is a 77 y.o. female.    Chief Complaint: Disease Management    Mrs. Arteaga is a 77 year old female who presents to clinic for follow up on MCTD and seronegative RA.  She is doing fairly well. Orencia IV was discussed at her last visit, but she decided not to pursue infusion due to concerns for s/e. She started PT for lumbar spine arthritis in August and she has done quite well. She reports significant improvement in her mobility, strength, and decrease in her pain level. She has noticed increased stiffness recently in the morning due to change in the weather, but overall she feels she is doing well. She is eating gluten free.     She is taking naproxen PRN for inflammation and tramadol 1/2 tab nightly if needed for severe pain and this helps her sleep. Rarely taking muscle relaxant.    Prior history:  Treatment was changed to Kevzara at her last visit and she completed 3 injections so far. No injection site reactions or side effects noted. She is not sure if she has noticed any improvement in her peripheral joint pain. She has pain and swelling in her hands specifically PIPs, wrists, and shoulders. She underwent R rotator cuff surgery Jan 17th Dr. Batista and is recovering fairly well; currently in PT. She had a fall on the R side in June 2021 that is when initial injury occurred and she completed PT for months without improvement.     She is having neck pain with radiating/shooting pain down the R arm into the hand. She has weakness in her hand as well. Neck pain has been ongoing for several months and has gradually progressed. She has low back/SI joint pain, which is constant and aching. She has worse muscle pain in her legs, which she has attributed to PT.   She is tolerating change from levothyroxine to armor thyroid. Recent TSH/T4 is normal. She is due for thyroid US to monitor nodule. She has history of elevated PTH and bordeline calcium.  She had covid infection  1/26/22 and still is suffering with brain fog and poor appetite since that infection. Not vaccinated.   PCP recommended she consider nexletol for HLP.    Current tx:  1. Naproxen prn    Prior tx:  1. Plaquenil  2. mobic   3. Rinvoq        Review of Systems   Constitutional:  Positive for activity change and fatigue. Negative for appetite change, chills and fever.   Eyes:  Negative for visual disturbance.   Respiratory:  Negative for cough and shortness of breath.    Cardiovascular:  Negative for chest pain, palpitations and leg swelling.   Gastrointestinal:  Negative for abdominal pain, constipation, diarrhea, nausea and vomiting.   Musculoskeletal:  Positive for arthralgias, back pain, joint swelling and myalgias.   Allergic/Immunologic: Positive for immunocompromised state.   Neurological:  Negative for dizziness, weakness, light-headedness and headaches.   Psychiatric/Behavioral:  Negative for dysphoric mood. The patient is not nervous/anxious.        Objective:     Vitals:    01/06/23 0828   BP: (!) 142/68   Pulse: 96       Past Medical History:   Diagnosis Date    Acute esophageal obstruction     Asthma     seasonal    Celiac disease 07/2020    Complex tear of lateral meniscus of right knee as current injury 3/28/2017    Depression     Duodenal ulcer     without hemorrhage or perforation     Dysphagia, unspecified(787.20)     Eosinophilic esophagitis     Fibrocystic breast     Generalized osteoarthrosis, involving multiple sites     GERD (gastroesophageal reflux disease)     Hyperlipidemia     Hyperparathyroidism 2/24/2022    Hypothyroidism     Lipoma of unspecified site     Other specified cardiac dysrhythmias(427.89)     PONV (postoperative nausea and vomiting)     Rheumatoid arthritis     Screening for other and unspecified cardiovascular conditions     Sinus problem     Stricture and stenosis of esophagus     Trigger ring finger of right hand     Unspecified asthma(493.90)      Past Surgical History:    Procedure Laterality Date    214.9      ADENOIDECTOMY      ANKLE SURGERY      cyst, left     SECTION, LOW TRANSVERSE      times 1    CHOLECYSTECTOMY      ESOPHAGOGASTRODUODENOSCOPY N/A 2019    Procedure: EGD (ESOPHAGOGASTRODUODENOSCOPY);  Surgeon: Brandon Negron MD;  Location: [facility];  Service: Endoscopy;  Laterality: N/A;    HAND SURGERY      HERNIA REPAIR  1979    left inguinal    HYSTERECTOMY      OOPHORECTOMY      SHOULDER SURGERY  2022    dr ch    TONSILLECTOMY      UPPER GASTROINTESTINAL ENDOSCOPY      VAGINAL DELIVERY      times 2          Physical Exam   Constitutional: She is oriented to person, place, and time.   Eyes: Right conjunctiva is not injected. Left conjunctiva is not injected.   Neck: No JVD present. No thyromegaly present.   Cardiovascular: Normal rate and regular rhythm. Exam reveals no decreased pulses.   Pulmonary/Chest: Effort normal.   Musculoskeletal:      Right shoulder: Tenderness present.      Left shoulder: Normal.      Right elbow: Normal.      Left elbow: Normal.      Right wrist: Swelling and tenderness present.      Left wrist: Normal.      Left knee: Normal.   Lymphadenopathy:     She has no cervical adenopathy.   Neurological: She is oriented to person, place, and time. Gait normal.   Skin: No rash noted.   Psychiatric: Mood and affect normal.       Right Side Rheumatological Exam     Examination finds the elbow, 4th MCP and 5th MCP normal.    The patient is tender to palpation of the shoulder, wrist, 5th PIP and 1st CMC    She has swelling of the wrist and 5th PIP    The patient has an enlarged 1st PIP, 2nd PIP, 3rd PIP, 4th PIP, 5th PIP, 2nd DIP, 3rd DIP, 4th DIP and 5th DIP    Left Side Rheumatological Exam     Examination finds the shoulder, elbow, wrist, knee, 2nd MCP, 3rd MCP, 4th MCP and 5th MCP normal.    The patient is tender to palpation of the 1st CMC.    The patient has an enlarged 1st PIP, 2nd PIP, 3rd PIP, 4th PIP, 5th PIP, 2nd  DIP, 3rd DIP, 4th DIP and 5th DIP.      Back/Neck Exam   Tenderness Right paramedian tenderness of the Lower L-Spine, Upper L-Spine, Lower C-Spine and Upper C-Spine.Left paramedian tenderness of the Lower L-Spine, Upper L-Spine, Upper C-Spine and Lower C-Spine.      Labs reviewed:  Component      Latest Ref Rng & Units 10/14/2022 8/27/2022   WBC      3.90 - 12.70 K/uL  7.19   RBC      4.00 - 5.40 M/uL  4.47   Hemoglobin      12.0 - 16.0 g/dL  12.8   Hematocrit      37.0 - 48.5 %  39.5   MCV      82 - 98 fL  88   MCH      27.0 - 31.0 pg  28.6   MCHC      32.0 - 36.0 g/dL  32.4   RDW      11.5 - 14.5 %  12.8   Platelets      150 - 450 K/uL  264   MPV      9.2 - 12.9 fL  9.0 (L)   Immature Granulocytes      0.0 - 0.5 %  0.4   Gran # (ANC)      1.8 - 7.7 K/uL  4.2   Immature Grans (Abs)      0.00 - 0.04 K/uL  0.03   Lymph #      1.0 - 4.8 K/uL  2.1   Mono #      0.3 - 1.0 K/uL  0.5   Eos #      0.0 - 0.5 K/uL  0.3   Baso #      0.00 - 0.20 K/uL  0.05   nRBC      0 /100 WBC  0   Gran %      38.0 - 73.0 %  58.9   Lymph %      18.0 - 48.0 %  28.9   Mono %      4.0 - 15.0 %  7.1   Eosinophil %      0.0 - 8.0 %  4.0   Basophil %      0.0 - 1.9 %  0.7   Differential Method        Automated   Sodium      136 - 145 mmol/L  138   Potassium      3.5 - 5.1 mmol/L  4.1   Chloride      95 - 110 mmol/L  103   CO2      22 - 31 mmol/L  24   Glucose      70 - 110 mg/dL  113 (H)   BUN      7 - 18 mg/dL  19 (H)   Creatinine      0.50 - 1.40 mg/dL  0.75   Calcium      8.4 - 10.2 mg/dL  9.8   PROTEIN TOTAL      6.0 - 8.4 g/dL  7.0   Albumin      3.5 - 5.2 g/dL  4.1   BILIRUBIN TOTAL      0.2 - 1.3 mg/dL  0.6   Alkaline Phosphatase      38 - 145 U/L  131   AST      14 - 36 U/L  32   ALT      0 - 35 U/L  27   Anion Gap      8 - 16 mmol/L  11   eGFR      >60 mL/min/1.73 m:2  >60   Cholesterol      120 - 199 mg/dL 241 (H)    Triglycerides      30 - 150 mg/dL 230 (H)    HDL      40 - 75 mg/dL 49    LDL Cholesterol External      63.0 - 159.0  mg/dL 146.0    HDL/Cholesterol Ratio      20.0 - 50.0 % 20.3    Total Cholesterol/HDL Ratio      2.0 - 5.0 4.9    Non-HDL Cholesterol      mg/dL 192    TSH      0.400 - 4.000 uIU/mL 1.640        Assessment:       1. MCTD (mixed connective tissue disease)    2. Hashimoto's thyroiditis    3. Asymptomatic postmenopausal state    4. Seronegative rheumatoid arthritis    5. Osteopenia, unspecified location              Plan:       MCTD (mixed connective tissue disease)  -     ketorolac injection 60 mg  -     cyanocobalamin injection 1,000 mcg    Hashimoto's thyroiditis  -     T4, Free; Future; Expected date: 01/06/2023  -     T3; Future; Expected date: 01/06/2023    Asymptomatic postmenopausal state  -     DXA Bone Density Spine And Hip; Future; Expected date: 01/06/2023    Seronegative rheumatoid arthritis  -     ketorolac injection 60 mg  -     cyanocobalamin injection 1,000 mcg    Osteopenia, unspecified location  -     DXA Bone Density Spine And Hip; Future; Expected date: 01/06/2023          Assessment:  77 year old female with  MCTD MATEUSZ 1:320 speckled, seronegative RA, homogenous, elevated CRP  --elevated PTH  --MTHFR  --NCGS  --Osteoarthritis  --sulfa allergy  --hyperlipidemia, unable to tolerate statins, on lovaza  --osteopenia    Plan:  1. toradol 60, b12 given today  2. Cont armour thyroid 60 mg  3. DEXA scan   4. Check labs soon  5. Hold off on orencia IV for now  6. Cont tramadol PRN.  I have checked [place] prescription monitoring program site and no unusual or abnormal behavior has occurred pt understand the risk and benefits of taking opioid medications and has decided to continue the medication.      Follow up:  3-4 mo Dr. De La Torre w/labs prior

## 2023-01-06 NOTE — PROGRESS NOTES
2pt ID used, allergies reviewed, see med,doses, and injection sites on MAR. Pt tolerated well. TC LPN

## 2023-01-08 RX ORDER — TRAMADOL HYDROCHLORIDE 50 MG/1
50 TABLET ORAL EVERY 12 HOURS PRN
Qty: 60 TABLET | Refills: 2 | Status: SHIPPED | OUTPATIENT
Start: 2023-01-08 | End: 2023-07-10

## 2023-01-18 ENCOUNTER — HOSPITAL ENCOUNTER (OUTPATIENT)
Dept: RADIOLOGY | Facility: HOSPITAL | Age: 78
Discharge: HOME | End: 2023-01-18
Attending: PHYSICIAN ASSISTANT
Payer: MEDICARE

## 2023-01-18 DIAGNOSIS — M85.80 OSTEOPENIA, UNSPECIFIED LOCATION: ICD-10-CM

## 2023-01-18 DIAGNOSIS — Z78.0 ASYMPTOMATIC POSTMENOPAUSAL STATE: ICD-10-CM

## 2023-01-18 PROCEDURE — 77080 DEXA BONE DENSITY SPINE HIP: ICD-10-PCS | Mod: 26,,, | Performed by: RADIOLOGY

## 2023-01-18 PROCEDURE — 77080 DXA BONE DENSITY AXIAL: CPT | Mod: 26,,, | Performed by: RADIOLOGY

## 2023-01-18 PROCEDURE — 77080 DXA BONE DENSITY AXIAL: CPT | Mod: TC,PO

## 2023-01-23 ENCOUNTER — TELEPHONE (OUTPATIENT)
Dept: RHEUMATOLOGY | Facility: CLINIC | Age: 78
End: 2023-01-23
Payer: MEDICARE

## 2023-01-23 NOTE — TELEPHONE ENCOUNTER
----- Message from Mohini Meza PA-C sent at 1/20/2023  5:40 PM CST -----  DEXA scan is consistent with osteoporosis. I recommend we start you on a fosamax 70 mg once weekly. Please let me know if she has questions or concerns about starting this.

## 2023-04-17 PROBLEM — E78.00 PURE HYPERCHOLESTEROLEMIA: Status: ACTIVE | Noted: 2023-04-17

## 2023-04-17 PROBLEM — M81.0 OSTEOPOROSIS: Status: ACTIVE | Noted: 2023-04-17

## 2023-05-02 ENCOUNTER — OFFICE VISIT (OUTPATIENT)
Dept: RHEUMATOLOGY | Facility: CLINIC | Age: 78
End: 2023-05-02
Payer: MEDICARE

## 2023-05-02 VITALS
HEART RATE: 93 BPM | WEIGHT: 149.94 LBS | HEIGHT: 62 IN | BODY MASS INDEX: 27.59 KG/M2 | DIASTOLIC BLOOD PRESSURE: 84 MMHG | SYSTOLIC BLOOD PRESSURE: 148 MMHG

## 2023-05-02 DIAGNOSIS — D80.4 IGM DEFICIENCY: ICD-10-CM

## 2023-05-02 DIAGNOSIS — E03.8 HYPOTHYROIDISM DUE TO HASHIMOTO'S THYROIDITIS: Chronic | ICD-10-CM

## 2023-05-02 DIAGNOSIS — M16.12 PRIMARY OSTEOARTHRITIS OF LEFT HIP: Primary | Chronic | ICD-10-CM

## 2023-05-02 DIAGNOSIS — E06.3 HASHIMOTO'S THYROIDITIS: ICD-10-CM

## 2023-05-02 DIAGNOSIS — E21.3 HYPERPARATHYROIDISM: ICD-10-CM

## 2023-05-02 DIAGNOSIS — E06.3 HYPOTHYROIDISM DUE TO HASHIMOTO'S THYROIDITIS: Chronic | ICD-10-CM

## 2023-05-02 DIAGNOSIS — J30.1 SEASONAL ALLERGIC RHINITIS DUE TO POLLEN: ICD-10-CM

## 2023-05-02 DIAGNOSIS — B02.8 HERPES ZOSTER WITH COMPLICATION: ICD-10-CM

## 2023-05-02 DIAGNOSIS — M35.1 MCTD (MIXED CONNECTIVE TISSUE DISEASE): ICD-10-CM

## 2023-05-02 DIAGNOSIS — K90.41 NON-CELIAC GLUTEN SENSITIVITY: ICD-10-CM

## 2023-05-02 DIAGNOSIS — F41.8 SITUATIONAL ANXIETY: Chronic | ICD-10-CM

## 2023-05-02 DIAGNOSIS — K20.0 EOSINOPHILIC ESOPHAGITIS: Chronic | ICD-10-CM

## 2023-05-02 PROCEDURE — 96372 THER/PROPH/DIAG INJ SC/IM: CPT | Mod: S$GLB,,, | Performed by: INTERNAL MEDICINE

## 2023-05-02 PROCEDURE — 3079F PR MOST RECENT DIASTOLIC BLOOD PRESSURE 80-89 MM HG: ICD-10-PCS | Mod: CPTII,S$GLB,, | Performed by: INTERNAL MEDICINE

## 2023-05-02 PROCEDURE — 3077F PR MOST RECENT SYSTOLIC BLOOD PRESSURE >= 140 MM HG: ICD-10-PCS | Mod: CPTII,S$GLB,, | Performed by: INTERNAL MEDICINE

## 2023-05-02 PROCEDURE — 3288F PR FALLS RISK ASSESSMENT DOCUMENTED: ICD-10-PCS | Mod: CPTII,S$GLB,, | Performed by: INTERNAL MEDICINE

## 2023-05-02 PROCEDURE — 1125F AMNT PAIN NOTED PAIN PRSNT: CPT | Mod: CPTII,S$GLB,, | Performed by: INTERNAL MEDICINE

## 2023-05-02 PROCEDURE — 1160F PR REVIEW ALL MEDS BY PRESCRIBER/CLIN PHARMACIST DOCUMENTED: ICD-10-PCS | Mod: CPTII,S$GLB,, | Performed by: INTERNAL MEDICINE

## 2023-05-02 PROCEDURE — 3077F SYST BP >= 140 MM HG: CPT | Mod: CPTII,S$GLB,, | Performed by: INTERNAL MEDICINE

## 2023-05-02 PROCEDURE — 99999 PR PBB SHADOW E&M-EST. PATIENT-LVL IV: CPT | Mod: PBBFAC,,, | Performed by: INTERNAL MEDICINE

## 2023-05-02 PROCEDURE — 1159F PR MEDICATION LIST DOCUMENTED IN MEDICAL RECORD: ICD-10-PCS | Mod: CPTII,S$GLB,, | Performed by: INTERNAL MEDICINE

## 2023-05-02 PROCEDURE — 1101F PR PT FALLS ASSESS DOC 0-1 FALLS W/OUT INJ PAST YR: ICD-10-PCS | Mod: CPTII,S$GLB,, | Performed by: INTERNAL MEDICINE

## 2023-05-02 PROCEDURE — 99999 PR PBB SHADOW E&M-EST. PATIENT-LVL IV: ICD-10-PCS | Mod: PBBFAC,,, | Performed by: INTERNAL MEDICINE

## 2023-05-02 PROCEDURE — 96372 PR INJECTION,THERAP/PROPH/DIAG2ST, IM OR SUBCUT: ICD-10-PCS | Mod: S$GLB,,, | Performed by: INTERNAL MEDICINE

## 2023-05-02 PROCEDURE — 99215 PR OFFICE/OUTPT VISIT, EST, LEVL V, 40-54 MIN: ICD-10-PCS | Mod: 25,S$GLB,, | Performed by: INTERNAL MEDICINE

## 2023-05-02 PROCEDURE — 99215 OFFICE O/P EST HI 40 MIN: CPT | Mod: 25,S$GLB,, | Performed by: INTERNAL MEDICINE

## 2023-05-02 PROCEDURE — 3288F FALL RISK ASSESSMENT DOCD: CPT | Mod: CPTII,S$GLB,, | Performed by: INTERNAL MEDICINE

## 2023-05-02 PROCEDURE — 3079F DIAST BP 80-89 MM HG: CPT | Mod: CPTII,S$GLB,, | Performed by: INTERNAL MEDICINE

## 2023-05-02 PROCEDURE — 1159F MED LIST DOCD IN RCRD: CPT | Mod: CPTII,S$GLB,, | Performed by: INTERNAL MEDICINE

## 2023-05-02 PROCEDURE — 1101F PT FALLS ASSESS-DOCD LE1/YR: CPT | Mod: CPTII,S$GLB,, | Performed by: INTERNAL MEDICINE

## 2023-05-02 PROCEDURE — 1125F PR PAIN SEVERITY QUANTIFIED, PAIN PRESENT: ICD-10-PCS | Mod: CPTII,S$GLB,, | Performed by: INTERNAL MEDICINE

## 2023-05-02 PROCEDURE — 1160F RVW MEDS BY RX/DR IN RCRD: CPT | Mod: CPTII,S$GLB,, | Performed by: INTERNAL MEDICINE

## 2023-05-02 RX ORDER — THYROID 60 MG/1
60 TABLET ORAL
Qty: 90 TABLET | Refills: 1 | Status: SHIPPED | OUTPATIENT
Start: 2023-05-02 | End: 2024-01-09

## 2023-05-02 RX ORDER — KETOROLAC TROMETHAMINE 30 MG/ML
60 INJECTION, SOLUTION INTRAMUSCULAR; INTRAVENOUS
Status: COMPLETED | OUTPATIENT
Start: 2023-05-02 | End: 2023-05-02

## 2023-05-02 RX ORDER — CYANOCOBALAMIN 1000 UG/ML
1000 INJECTION, SOLUTION INTRAMUSCULAR; SUBCUTANEOUS
Status: COMPLETED | OUTPATIENT
Start: 2023-05-02 | End: 2023-05-02

## 2023-05-02 RX ORDER — VALACYCLOVIR HYDROCHLORIDE 1 G/1
1000 TABLET, FILM COATED ORAL 3 TIMES DAILY
Qty: 42 TABLET | Refills: 0 | Status: SHIPPED | OUTPATIENT
Start: 2023-05-02 | End: 2024-01-26

## 2023-05-02 RX ORDER — KETOCONAZOLE 20 MG/ML
SHAMPOO, SUSPENSION TOPICAL
Qty: 240 ML | Refills: 1 | Status: SHIPPED | OUTPATIENT
Start: 2023-05-04

## 2023-05-02 RX ADMIN — CYANOCOBALAMIN 1000 MCG: 1000 INJECTION, SOLUTION INTRAMUSCULAR; SUBCUTANEOUS at 12:05

## 2023-05-02 RX ADMIN — KETOROLAC TROMETHAMINE 60 MG: 30 INJECTION, SOLUTION INTRAMUSCULAR; INTRAVENOUS at 12:05

## 2023-05-02 ASSESSMENT — ROUTINE ASSESSMENT OF PATIENT INDEX DATA (RAPID3)
PSYCHOLOGICAL DISTRESS SCORE: 2.2
FATIGUE SCORE: 1.1
PATIENT GLOBAL ASSESSMENT SCORE: 5.5
PAIN SCORE: 6
TOTAL RAPID3 SCORE: 4.83
MDHAQ FUNCTION SCORE: 0.9

## 2023-05-02 NOTE — PROGRESS NOTES
Subjective:       Patient ID: Elham Arteaga is a 77 y.o. female.    Chief Complaint: Disease Management    Follow up: 77 year old female who presents to clinic for follow up on MCTD and seronegative RA. She started PT, it has helped but her left sciatic area is very tight. She is doing fairly well. She had an EDG and her esophagus was stretched. She started PT for lumbar spine arthritis in August and she has done quite well. She reports significant improvement in her mobility, strength, and decrease in her pain level. She has noticed increased stiffness recently in the morning due to change in the weather, but overall she feels she is doing well. She has a rash on her arm because she has eaten some gluten.    She is taking naproxen PRN for inflammation and tramadol 1/2 tab nightly if needed for severe pain and this helps her sleep. Rarely taking muscle relaxant.        Current tx:  1. Naproxen prn    Prior tx:  1. Plaquenil  2. mobic   3. Rinvoq  4. orencia        Review of Systems   Constitutional:  Positive for activity change and fatigue. Negative for appetite change and chills.   Eyes:  Negative for visual disturbance.   Respiratory:  Negative for cough and shortness of breath.    Cardiovascular:  Negative for chest pain, palpitations and leg swelling.   Gastrointestinal:  Negative for abdominal pain, constipation, diarrhea, nausea and vomiting.   Musculoskeletal:  Positive for arthralgias, back pain and myalgias.   Allergic/Immunologic: Positive for immunocompromised state.   Neurological:  Negative for dizziness, weakness and light-headedness.   Psychiatric/Behavioral:  Negative for dysphoric mood. The patient is not nervous/anxious.        Objective:     Vitals:    05/02/23 1106   BP: (!) 148/84   Pulse: 93       Past Medical History:   Diagnosis Date    Acute esophageal obstruction     Asthma     seasonal    Celiac disease 07/2020    Complex tear of lateral meniscus of right knee as current injury 3/28/2017     Depression     Duodenal ulcer     without hemorrhage or perforation     Dysphagia, unspecified(787.20)     Eosinophilic esophagitis     Fibrocystic breast     Generalized osteoarthrosis, involving multiple sites     GERD (gastroesophageal reflux disease)     Hyperlipidemia     Hyperparathyroidism 2022    Hypothyroidism     Lipoma of unspecified site     Other specified cardiac dysrhythmias(427.89)     PONV (postoperative nausea and vomiting)     Rheumatoid arthritis     Screening for other and unspecified cardiovascular conditions     Sinus problem     Stricture and stenosis of esophagus     Trigger ring finger of right hand     Unspecified asthma(493.90)      Past Surgical History:   Procedure Laterality Date    214.9      ADENOIDECTOMY      ANKLE SURGERY  2009    cyst, left     SECTION, LOW TRANSVERSE      times 1    CHOLECYSTECTOMY      ESOPHAGOGASTRODUODENOSCOPY N/A 2019    Procedure: EGD (ESOPHAGOGASTRODUODENOSCOPY);  Surgeon: Brandon Negron MD;  Location: [facility];  Service: Endoscopy;  Laterality: N/A;    HAND SURGERY      HERNIA REPAIR      left inguinal    HYSTERECTOMY      OOPHORECTOMY      SHOULDER SURGERY  2022    dr ch    TONSILLECTOMY      UPPER GASTROINTESTINAL ENDOSCOPY      VAGINAL DELIVERY      times 2          Physical Exam   Constitutional: She is oriented to person, place, and time.   Eyes: Right conjunctiva is not injected. Left conjunctiva is not injected.   Neck: No JVD present. No thyromegaly present.   Cardiovascular: Normal rate and regular rhythm. Exam reveals no decreased pulses.   Pulmonary/Chest: Effort normal.   Musculoskeletal:         General: Tenderness and deformity present.      Right shoulder: Tenderness present.      Left shoulder: Normal.      Right elbow: Normal.      Left elbow: Normal.      Right wrist: Swelling and tenderness present.      Left wrist: Normal.      Left knee: Normal.   Lymphadenopathy:     She has no cervical  adenopathy.   Neurological: She is oriented to person, place, and time. Gait normal.   Skin: No rash noted.   Psychiatric: Mood and affect normal.       Right Side Rheumatological Exam     Examination finds the elbow, 4th MCP and 5th MCP normal.    The patient is tender to palpation of the shoulder, wrist, 5th PIP and 1st CMC    She has swelling of the wrist and 5th PIP    The patient has an enlarged 1st PIP, 2nd PIP, 3rd PIP, 4th PIP, 5th PIP, 2nd DIP, 3rd DIP, 4th DIP and 5th DIP    Left Side Rheumatological Exam     Examination finds the shoulder, elbow, wrist, knee, 2nd MCP, 3rd MCP, 4th MCP and 5th MCP normal.    The patient is tender to palpation of the 1st CMC.    The patient has an enlarged 1st PIP, 2nd PIP, 3rd PIP, 4th PIP, 5th PIP, 2nd DIP, 3rd DIP, 4th DIP and 5th DIP.      Back/Neck Exam   Tenderness Right paramedian tenderness of the Lower L-Spine, Upper L-Spine, Lower C-Spine and Upper C-Spine.Left paramedian tenderness of the Lower L-Spine, Upper L-Spine, Upper C-Spine and Lower C-Spine.        Results for orders placed or performed in visit on 04/26/23   Lipid Panel   Result Value Ref Range    Cholesterol 219 (H) 120 - 199 mg/dL    Triglycerides 133 30 - 150 mg/dL    HDL 49 40 - 75 mg/dL    LDL Cholesterol 143.4 63.0 - 159.0 mg/dL    HDL/Cholesterol Ratio 22.4 20.0 - 50.0 %    Total Cholesterol/HDL Ratio 4.5 2.0 - 5.0    Non-HDL Cholesterol 170 mg/dL   TSH   Result Value Ref Range    TSH 3.130 0.400 - 4.000 uIU/mL       reviewed labs with patient during this visit       Assessment:       1. Primary osteoarthritis of left hip    2. Seasonal allergic rhinitis due to pollen    3. Hypothyroidism due to Hashimoto's thyroiditis    4. Eosinophilic esophagitis    5. Situational anxiety    6. MCTD (mixed connective tissue disease)    7. Non-celiac gluten sensitivity    8. IgM deficiency    9. Herpes zoster with complication    10. Hashimoto's thyroiditis    11. Hyperparathyroidism                Plan:        Primary osteoarthritis of left hip  -     ketoconazole (NIZORAL) 2 % shampoo; Apply topically twice a week.  Dispense: 240 mL; Refill: 1  -     Sedimentation rate; Future; Expected date: 05/02/2023  -     C-Reactive Protein; Future; Expected date: 05/02/2023  -     Comprehensive Metabolic Panel; Future; Expected date: 05/02/2023  -     CBC Auto Differential; Future; Expected date: 05/02/2023  -     MATEUSZ Screen w/Reflex; Future; Expected date: 05/02/2023  -     TSH; Future; Expected date: 05/02/2023  -     T4, Free; Future; Expected date: 05/02/2023  -     T3, Free; Future; Expected date: 05/02/2023  -     ketorolac injection 60 mg  -     cyanocobalamin injection 1,000 mcg    Seasonal allergic rhinitis due to pollen  -     ketoconazole (NIZORAL) 2 % shampoo; Apply topically twice a week.  Dispense: 240 mL; Refill: 1  -     Sedimentation rate; Future; Expected date: 05/02/2023  -     C-Reactive Protein; Future; Expected date: 05/02/2023  -     Comprehensive Metabolic Panel; Future; Expected date: 05/02/2023  -     CBC Auto Differential; Future; Expected date: 05/02/2023  -     MATEUSZ Screen w/Reflex; Future; Expected date: 05/02/2023  -     TSH; Future; Expected date: 05/02/2023  -     T4, Free; Future; Expected date: 05/02/2023  -     T3, Free; Future; Expected date: 05/02/2023    Hypothyroidism due to Hashimoto's thyroiditis  -     ketoconazole (NIZORAL) 2 % shampoo; Apply topically twice a week.  Dispense: 240 mL; Refill: 1  -     Sedimentation rate; Future; Expected date: 05/02/2023  -     C-Reactive Protein; Future; Expected date: 05/02/2023  -     Comprehensive Metabolic Panel; Future; Expected date: 05/02/2023  -     CBC Auto Differential; Future; Expected date: 05/02/2023  -     MATEUSZ Screen w/Reflex; Future; Expected date: 05/02/2023  -     TSH; Future; Expected date: 05/02/2023  -     T4, Free; Future; Expected date: 05/02/2023  -     T3, Free; Future; Expected date: 05/02/2023    Eosinophilic esophagitis  -      ketoconazole (NIZORAL) 2 % shampoo; Apply topically twice a week.  Dispense: 240 mL; Refill: 1  -     Sedimentation rate; Future; Expected date: 05/02/2023  -     C-Reactive Protein; Future; Expected date: 05/02/2023  -     Comprehensive Metabolic Panel; Future; Expected date: 05/02/2023  -     CBC Auto Differential; Future; Expected date: 05/02/2023  -     MATEUSZ Screen w/Reflex; Future; Expected date: 05/02/2023  -     TSH; Future; Expected date: 05/02/2023  -     T4, Free; Future; Expected date: 05/02/2023  -     T3, Free; Future; Expected date: 05/02/2023    Situational anxiety  -     ketoconazole (NIZORAL) 2 % shampoo; Apply topically twice a week.  Dispense: 240 mL; Refill: 1  -     Sedimentation rate; Future; Expected date: 05/02/2023  -     C-Reactive Protein; Future; Expected date: 05/02/2023  -     Comprehensive Metabolic Panel; Future; Expected date: 05/02/2023  -     CBC Auto Differential; Future; Expected date: 05/02/2023  -     MATEUSZ Screen w/Reflex; Future; Expected date: 05/02/2023  -     TSH; Future; Expected date: 05/02/2023  -     T4, Free; Future; Expected date: 05/02/2023  -     T3, Free; Future; Expected date: 05/02/2023    MCTD (mixed connective tissue disease)  -     ketoconazole (NIZORAL) 2 % shampoo; Apply topically twice a week.  Dispense: 240 mL; Refill: 1  -     Sedimentation rate; Future; Expected date: 05/02/2023  -     C-Reactive Protein; Future; Expected date: 05/02/2023  -     Comprehensive Metabolic Panel; Future; Expected date: 05/02/2023  -     CBC Auto Differential; Future; Expected date: 05/02/2023  -     MATEUSZ Screen w/Reflex; Future; Expected date: 05/02/2023  -     TSH; Future; Expected date: 05/02/2023  -     T4, Free; Future; Expected date: 05/02/2023  -     T3, Free; Future; Expected date: 05/02/2023  -     ketorolac injection 60 mg  -     cyanocobalamin injection 1,000 mcg    Non-celiac gluten sensitivity  -     ketoconazole (NIZORAL) 2 % shampoo; Apply topically twice a week.   Dispense: 240 mL; Refill: 1  -     Sedimentation rate; Future; Expected date: 05/02/2023  -     C-Reactive Protein; Future; Expected date: 05/02/2023  -     Comprehensive Metabolic Panel; Future; Expected date: 05/02/2023  -     CBC Auto Differential; Future; Expected date: 05/02/2023  -     MATEUSZ Screen w/Reflex; Future; Expected date: 05/02/2023  -     TSH; Future; Expected date: 05/02/2023  -     T4, Free; Future; Expected date: 05/02/2023  -     T3, Free; Future; Expected date: 05/02/2023  -     ketorolac injection 60 mg  -     cyanocobalamin injection 1,000 mcg    IgM deficiency  -     ketoconazole (NIZORAL) 2 % shampoo; Apply topically twice a week.  Dispense: 240 mL; Refill: 1  -     Sedimentation rate; Future; Expected date: 05/02/2023  -     C-Reactive Protein; Future; Expected date: 05/02/2023  -     Comprehensive Metabolic Panel; Future; Expected date: 05/02/2023  -     CBC Auto Differential; Future; Expected date: 05/02/2023  -     MATEUSZ Screen w/Reflex; Future; Expected date: 05/02/2023  -     TSH; Future; Expected date: 05/02/2023  -     T4, Free; Future; Expected date: 05/02/2023  -     T3, Free; Future; Expected date: 05/02/2023  -     ketorolac injection 60 mg  -     cyanocobalamin injection 1,000 mcg    Herpes zoster with complication  -     valACYclovir (VALTREX) 1000 MG tablet; Take 1 tablet (1,000 mg total) by mouth 3 (three) times daily. for 14 days  Dispense: 42 tablet; Refill: 0    Hashimoto's thyroiditis  -     thyroid, pork, (ARMOUR THYROID) 60 mg Tab; Take 1 tablet (60 mg total) by mouth before breakfast.  Dispense: 90 tablet; Refill: 1    Hyperparathyroidism  -     PTH, Intact; Future; Expected date: 05/02/2023            Assessment:  77 year old female with  MCTD MATEUSZ 1:320 speckled, seronegative RA, homogenous, elevated CRP  --elevated PTH  --MTHFR  --NCGS  --Osteoarthritis  --sulfa allergy  --hyperlipidemia, unable to tolerate statins, on lovaza  --osteopenia    Plan:  1. toradol 60, b12  given today  2. Cont armour thyroid 60 mg  3. Osteoporisis consider prolia  4. Check labs soon  5. Hold off on orencia IV for now  6. Cont tramadol PRN.  I have checked [place] prescription monitoring program site and no unusual or abnormal behavior has occurred pt understand the risk and benefits of taking opioid medications and has decided to continue the medication.    Addendum:  Collected Updated Procedure    05/03/2023 1159 05/03/2023 2252 PTH, Intact [[id number]]   (Abnormal)   Blood    Component Value Units   PTH, Intact 116.0 High  pg/mL          05/03/2023 1159 05/03/2023 2227 T3, Free [[id number]]   Blood    Component Value Units   T3, Free 3.5 pg/mL          05/03/2023 1159 05/03/2023 2227 T4, Free          Blood    Component Value Units   Free T4 0.75 ng/dL          05/03/2023 1159 05/03/2023 2227 TSH [[id number]]   Blood    Component Value Units   TSH 2.559 uIU/mL          05/03/2023 1159 05/04/2023 1738 MATEUSZ Screen w/Reflex [[id number]]   (Abnormal)   Blood    Component Value   MATEUSZ Screen Positive Abnormal            05/03/2023 1159 05/03/2023 1843 CBC Auto Differential [[id number]]   (Abnormal)   Blood    Component Value Units   WBC 7.21 K/uL   RBC 4.83 M/uL   Hemoglobin 13.3 g/dL   Hematocrit 42.6 %   MCV 88 fL   MCH 27.5 pg   MCHC 31.2 Low  g/dL   RDW 13.6 %   Platelets 254 K/uL   MPV 9.1 Low  fL   Immature Granulocytes 0.1 %   Gran # (ANC) 4.0 K/uL   Immature Grans (Abs) 0.01  K/uL   Lymph # 2.3 K/uL   Mono # 0.5 K/uL   Eos # 0.4 K/uL   Baso # 0.04 K/uL   nRBC 0 /100 WBC   Gran % 55.1 %   Lymph % 31.2 %   Mono % 7.5 %   Eosinophil % 5.5 %   Basophil % 0.6 %   Differential Method Automated           05/03/2023 1159 05/03/2023 2129 Comprehensive Metabolic Panel [[id number]]   (Abnormal)   Blood    Component Value Units   Sodium 139 mmol/L   Potassium 5.0 mmol/L   Chloride 106 mmol/L   CO2 25 mmol/L   Glucose 117 High  mg/dL   BUN 23 mg/dL   Creatinine 0.9 mg/dL   Calcium 9.7 mg/dL   Total Protein 6.9 g/dL    Albumin 3.7 g/dL   Total Bilirubin 0.5  mg/dL   Alkaline Phosphatase 107 U/L   AST 21 U/L   ALT 24 U/L   Anion Gap 8 mmol/L   eGFR >60.0 mL/min/1.73 m^2          05/03/2023 1159 05/03/2023 2128 C-Reactive Protein [[id number]]   (Abnormal)   Blood    Component Value Units   CRP 18.4 High  mg/L          05/03/2023 1159 05/03/2023 1932 Sedimentation rate [[id number]]   Blood    Component Value Units   Sed Rate 28 mm/Hr          05/03/2023 1159 05/04/2023 1738 MATEUSZ Titer 1 [[id number]] Component Value   MATEUSZ Titer 1 1:160          05/03/2023 1159 05/04/2023 1738 MATEUSZ Profile [[id number]] Component Value   No component results          05/03/2023 1159 05/04/2023 1738 MATEUSZ Pattern 1 [[id number]] Component Value   MATEUSZ PATTERN 1 Homogeneous          I will order a CT for DA of her parathyroid glands to evaluate the parathyroid        More than 50% of the  60 minute encounter was spent face to face counseling the patient regarding current status and future plan of care as well as side effects  of the medications. All questions were answered to patient's satisfaction also includes  non-face to face time preparing to see the patient (eg, review of tests), Obtaining and/or reviewing separately obtained history, Documenting clinical information in the electronic or other health record, Independently interpreting results

## 2023-05-03 ENCOUNTER — LAB VISIT (OUTPATIENT)
Dept: LAB | Facility: HOSPITAL | Age: 78
End: 2023-05-03
Attending: INTERNAL MEDICINE
Payer: MEDICARE

## 2023-05-03 DIAGNOSIS — M35.1 MCTD (MIXED CONNECTIVE TISSUE DISEASE): ICD-10-CM

## 2023-05-03 DIAGNOSIS — E03.8 HYPOTHYROIDISM DUE TO HASHIMOTO'S THYROIDITIS: Chronic | ICD-10-CM

## 2023-05-03 DIAGNOSIS — J30.1 SEASONAL ALLERGIC RHINITIS DUE TO POLLEN: ICD-10-CM

## 2023-05-03 DIAGNOSIS — D80.4 IGM DEFICIENCY: ICD-10-CM

## 2023-05-03 DIAGNOSIS — E06.3 HYPOTHYROIDISM DUE TO HASHIMOTO'S THYROIDITIS: Chronic | ICD-10-CM

## 2023-05-03 DIAGNOSIS — F41.8 SITUATIONAL ANXIETY: Chronic | ICD-10-CM

## 2023-05-03 DIAGNOSIS — K90.41 NON-CELIAC GLUTEN SENSITIVITY: ICD-10-CM

## 2023-05-03 DIAGNOSIS — K20.0 EOSINOPHILIC ESOPHAGITIS: Chronic | ICD-10-CM

## 2023-05-03 DIAGNOSIS — E21.3 HYPERPARATHYROIDISM: ICD-10-CM

## 2023-05-03 DIAGNOSIS — M16.12 PRIMARY OSTEOARTHRITIS OF LEFT HIP: Chronic | ICD-10-CM

## 2023-05-03 LAB
ALBUMIN SERPL BCP-MCNC: 3.7 G/DL (ref 3.5–5.2)
ALP SERPL-CCNC: 107 U/L (ref 55–135)
ALT SERPL W/O P-5'-P-CCNC: 24 U/L (ref 10–44)
ANION GAP SERPL CALC-SCNC: 8 MMOL/L (ref 8–16)
AST SERPL-CCNC: 21 U/L (ref 10–40)
BASOPHILS # BLD AUTO: 0.04 K/UL (ref 0–0.2)
BASOPHILS NFR BLD: 0.6 % (ref 0–1.9)
BILIRUB SERPL-MCNC: 0.5 MG/DL (ref 0.1–1)
BUN SERPL-MCNC: 23 MG/DL (ref 8–23)
CALCIUM SERPL-MCNC: 9.7 MG/DL (ref 8.7–10.5)
CHLORIDE SERPL-SCNC: 106 MMOL/L (ref 95–110)
CO2 SERPL-SCNC: 25 MMOL/L (ref 23–29)
CREAT SERPL-MCNC: 0.9 MG/DL (ref 0.5–1.4)
CRP SERPL-MCNC: 18.4 MG/L (ref 0–8.2)
DIFFERENTIAL METHOD: ABNORMAL
EOSINOPHIL # BLD AUTO: 0.4 K/UL (ref 0–0.5)
EOSINOPHIL NFR BLD: 5.5 % (ref 0–8)
ERYTHROCYTE [DISTWIDTH] IN BLOOD BY AUTOMATED COUNT: 13.6 % (ref 11.5–14.5)
ERYTHROCYTE [SEDIMENTATION RATE] IN BLOOD BY PHOTOMETRIC METHOD: 28 MM/HR (ref 0–36)
EST. GFR  (NO RACE VARIABLE): >60 ML/MIN/1.73 M^2
GLUCOSE SERPL-MCNC: 117 MG/DL (ref 70–110)
HCT VFR BLD AUTO: 42.6 % (ref 37–48.5)
HGB BLD-MCNC: 13.3 G/DL (ref 12–16)
IMM GRANULOCYTES # BLD AUTO: 0.01 K/UL (ref 0–0.04)
IMM GRANULOCYTES NFR BLD AUTO: 0.1 % (ref 0–0.5)
LYMPHOCYTES # BLD AUTO: 2.3 K/UL (ref 1–4.8)
LYMPHOCYTES NFR BLD: 31.2 % (ref 18–48)
MCH RBC QN AUTO: 27.5 PG (ref 27–31)
MCHC RBC AUTO-ENTMCNC: 31.2 G/DL (ref 32–36)
MCV RBC AUTO: 88 FL (ref 82–98)
MONOCYTES # BLD AUTO: 0.5 K/UL (ref 0.3–1)
MONOCYTES NFR BLD: 7.5 % (ref 4–15)
NEUTROPHILS # BLD AUTO: 4 K/UL (ref 1.8–7.7)
NEUTROPHILS NFR BLD: 55.1 % (ref 38–73)
NRBC BLD-RTO: 0 /100 WBC
PLATELET # BLD AUTO: 254 K/UL (ref 150–450)
PMV BLD AUTO: 9.1 FL (ref 9.2–12.9)
POTASSIUM SERPL-SCNC: 5 MMOL/L (ref 3.5–5.1)
PROT SERPL-MCNC: 6.9 G/DL (ref 6–8.4)
PTH-INTACT SERPL-MCNC: 116 PG/ML (ref 9–77)
RBC # BLD AUTO: 4.83 M/UL (ref 4–5.4)
SODIUM SERPL-SCNC: 139 MMOL/L (ref 136–145)
T3FREE SERPL-MCNC: 3.5 PG/ML (ref 2.3–4.2)
T4 FREE SERPL-MCNC: 0.75 NG/DL (ref 0.71–1.51)
TSH SERPL DL<=0.005 MIU/L-ACNC: 2.56 UIU/ML (ref 0.4–4)
WBC # BLD AUTO: 7.21 K/UL (ref 3.9–12.7)

## 2023-05-03 PROCEDURE — 36415 COLL VENOUS BLD VENIPUNCTURE: CPT | Mod: PO | Performed by: INTERNAL MEDICINE

## 2023-05-03 PROCEDURE — 85651 RBC SED RATE NONAUTOMATED: CPT | Mod: PO | Performed by: INTERNAL MEDICINE

## 2023-05-03 PROCEDURE — 86038 ANTINUCLEAR ANTIBODIES: CPT | Performed by: INTERNAL MEDICINE

## 2023-05-03 PROCEDURE — 84439 ASSAY OF FREE THYROXINE: CPT | Performed by: INTERNAL MEDICINE

## 2023-05-03 PROCEDURE — 83970 ASSAY OF PARATHORMONE: CPT | Performed by: INTERNAL MEDICINE

## 2023-05-03 PROCEDURE — 86225 DNA ANTIBODY NATIVE: CPT | Performed by: INTERNAL MEDICINE

## 2023-05-03 PROCEDURE — 84481 FREE ASSAY (FT-3): CPT | Performed by: INTERNAL MEDICINE

## 2023-05-03 PROCEDURE — 86235 NUCLEAR ANTIGEN ANTIBODY: CPT | Mod: 59 | Performed by: INTERNAL MEDICINE

## 2023-05-03 PROCEDURE — 86039 ANTINUCLEAR ANTIBODIES (ANA): CPT | Performed by: INTERNAL MEDICINE

## 2023-05-03 PROCEDURE — 85025 COMPLETE CBC W/AUTO DIFF WBC: CPT | Performed by: INTERNAL MEDICINE

## 2023-05-03 PROCEDURE — 86140 C-REACTIVE PROTEIN: CPT | Performed by: INTERNAL MEDICINE

## 2023-05-03 PROCEDURE — 80053 COMPREHEN METABOLIC PANEL: CPT | Performed by: INTERNAL MEDICINE

## 2023-05-03 PROCEDURE — 84443 ASSAY THYROID STIM HORMONE: CPT | Performed by: INTERNAL MEDICINE

## 2023-05-04 ENCOUNTER — PATIENT MESSAGE (OUTPATIENT)
Dept: RHEUMATOLOGY | Facility: CLINIC | Age: 78
End: 2023-05-04
Payer: MEDICARE

## 2023-05-04 LAB
ANA PATTERN 1: NORMAL
ANA SER QL IF: POSITIVE
ANA TITR SER IF: NORMAL {TITER}

## 2023-05-05 LAB
ANTI SM ANTIBODY: 0.1 RATIO (ref 0–0.99)
ANTI SM/RNP ANTIBODY: 0.17 RATIO (ref 0–0.99)
ANTI-SM INTERPRETATION: NEGATIVE
ANTI-SM/RNP INTERPRETATION: NEGATIVE
ANTI-SSA ANTIBODY: 0.09 RATIO (ref 0–0.99)
ANTI-SSA INTERPRETATION: NEGATIVE
ANTI-SSB ANTIBODY: 0.11 RATIO (ref 0–0.99)
ANTI-SSB INTERPRETATION: NEGATIVE
DSDNA AB SER-ACNC: NORMAL [IU]/ML

## 2023-05-09 ENCOUNTER — PATIENT MESSAGE (OUTPATIENT)
Dept: RHEUMATOLOGY | Facility: CLINIC | Age: 78
End: 2023-05-09
Payer: MEDICARE

## 2023-05-12 ENCOUNTER — HOSPITAL ENCOUNTER (OUTPATIENT)
Dept: RADIOLOGY | Facility: HOSPITAL | Age: 78
Discharge: HOME | End: 2023-05-12
Attending: INTERNAL MEDICINE
Payer: MEDICARE

## 2023-05-12 DIAGNOSIS — E21.3 HYPERPARATHYROIDISM: ICD-10-CM

## 2023-05-12 PROCEDURE — 70492 CT SFT TSUE NCK W/O & W/DYE: CPT | Mod: TC,PO

## 2023-05-12 PROCEDURE — 25500020 PHARM REV CODE 255: Mod: PO | Performed by: INTERNAL MEDICINE

## 2023-05-12 PROCEDURE — 70492 CT SFT TSUE NCK W/O & W/DYE: CPT | Mod: 26,,, | Performed by: RADIOLOGY

## 2023-05-12 PROCEDURE — 70492 CT NECK PARATHYROID (4D): ICD-10-PCS | Mod: 26,,, | Performed by: RADIOLOGY

## 2023-05-12 RX ADMIN — IOHEXOL 100 ML: 350 INJECTION, SOLUTION INTRAVENOUS at 09:05

## 2023-05-16 ENCOUNTER — TELEPHONE (OUTPATIENT)
Dept: ENDOCRINOLOGY | Facility: CLINIC | Age: 78
End: 2023-05-16
Payer: MEDICARE

## 2023-05-16 ENCOUNTER — PATIENT MESSAGE (OUTPATIENT)
Dept: RHEUMATOLOGY | Facility: CLINIC | Age: 78
End: 2023-05-16
Payer: MEDICARE

## 2023-05-16 NOTE — TELEPHONE ENCOUNTER
----- Message from Tita Lara sent at 5/16/2023  2:00 PM CDT -----  Contact: pt  Type: Needs Medical Advice         Who Called: PT  Best Call Back Number:[phone]  Additional Information: Requesting a call back regarding  Pt has a referral to office and asking for office to call her before an appt is set to ensure that her test results are reviewed  and she has a few questions.   Please Advise- Thank you

## 2023-05-16 NOTE — TELEPHONE ENCOUNTER
Spoke to patient, advised her that Dr. Lu was leaving the practice so we could not book he ra appointment. Going to see about going to the parathyroid center in [facility].

## 2023-05-17 ENCOUNTER — PATIENT MESSAGE (OUTPATIENT)
Dept: FAMILY MEDICINE | Facility: CLINIC | Age: 78
End: 2023-05-17
Payer: MEDICARE

## 2023-05-19 ENCOUNTER — PATIENT MESSAGE (OUTPATIENT)
Dept: RHEUMATOLOGY | Facility: CLINIC | Age: 78
End: 2023-05-19
Payer: MEDICARE

## 2023-05-29 ENCOUNTER — TELEPHONE (OUTPATIENT)
Dept: RHEUMATOLOGY | Facility: CLINIC | Age: 78
End: 2023-05-29
Payer: MEDICARE

## 2023-05-29 NOTE — TELEPHONE ENCOUNTER
----- Message from Abran Shaffer sent at 5/29/2023  8:55 AM CDT -----  Type: Needs Medical Advice  Who Called:  pt  Symptoms (please be specific):  pt said she need to speak to the nurse--said it's about her CT scan on 5/12--said she need to know what to do next--please call and advise  Best Call Back Number: [phone] (home)     Additional Information: thank you

## 2023-05-30 ENCOUNTER — PATIENT MESSAGE (OUTPATIENT)
Dept: RHEUMATOLOGY | Facility: CLINIC | Age: 78
End: 2023-05-30
Payer: MEDICARE

## 2023-05-30 DIAGNOSIS — J32.9 SINUSITIS, UNSPECIFIED CHRONICITY, UNSPECIFIED LOCATION: ICD-10-CM

## 2023-05-30 DIAGNOSIS — E21.3 HYPERPARATHYROIDISM: Primary | ICD-10-CM

## 2023-06-01 ENCOUNTER — TELEPHONE (OUTPATIENT)
Dept: ENDOCRINOLOGY | Facility: CLINIC | Age: 78
End: 2023-06-01
Payer: MEDICARE

## 2023-06-01 RX ORDER — AZITHROMYCIN 250 MG/1
TABLET, FILM COATED ORAL
Qty: 6 TABLET | Refills: 0 | Status: SHIPPED | OUTPATIENT
Start: 2023-06-01 | End: 2023-08-22

## 2023-06-01 NOTE — TELEPHONE ENCOUNTER
Offered an appt with ANA ROSA Pham PA-C in Sept 2023 but she declined stating she need to see an Endocrinology Surgeon.

## 2023-06-06 ENCOUNTER — TELEPHONE (OUTPATIENT)
Dept: RHEUMATOLOGY | Facility: CLINIC | Age: 78
End: 2023-06-06
Payer: MEDICARE

## 2023-06-06 NOTE — TELEPHONE ENCOUNTER
----- Message from Cathy Rodriguez sent at 6/6/2023 10:00 AM CDT -----  Contact: self  Type: Needs Medical Advice  Who Called: Patient   Best Call Back Number:[phone]  office fax number: [phone]  Additional Information: Pt states she needs a call back so she can verify if the Dr. Haile Dixon MD (ENT doctor )  office never got a referral from Dr. De La Torre. Plz call pt to confirm. Pt has  an appt with the  June 20th but his office still needs the referral.Thanks

## 2023-07-10 DIAGNOSIS — G89.4 CHRONIC PAIN SYNDROME: ICD-10-CM

## 2023-07-10 DIAGNOSIS — K20.0 EOSINOPHILIC ESOPHAGITIS: Primary | ICD-10-CM

## 2023-07-10 RX ORDER — TRAMADOL HYDROCHLORIDE 50 MG/1
TABLET ORAL
Qty: 60 TABLET | Refills: 3 | Status: SHIPPED | OUTPATIENT
Start: 2023-07-10 | End: 2023-07-11 | Stop reason: SDUPTHER

## 2023-07-11 RX ORDER — TRAMADOL HYDROCHLORIDE 50 MG/1
TABLET ORAL
Qty: 60 TABLET | Refills: 3 | Status: SHIPPED | OUTPATIENT
Start: 2023-07-11 | End: 2023-10-18 | Stop reason: ALTCHOICE

## 2023-08-10 ENCOUNTER — PATIENT MESSAGE (OUTPATIENT)
Dept: RHEUMATOLOGY | Facility: CLINIC | Age: 78
End: 2023-08-10
Payer: MEDICARE

## 2023-08-23 PROBLEM — D35.1 BENIGN NEOPLASM OF PARATHYROID GLAND: Status: ACTIVE | Noted: 2023-08-23

## 2023-09-15 ENCOUNTER — TELEPHONE (OUTPATIENT)
Dept: RHEUMATOLOGY | Facility: CLINIC | Age: 78
End: 2023-09-15
Payer: MEDICARE

## 2023-09-15 ENCOUNTER — OFFICE VISIT (OUTPATIENT)
Dept: RHEUMATOLOGY | Facility: CLINIC | Age: 78
End: 2023-09-15
Payer: MEDICARE

## 2023-09-15 VITALS
WEIGHT: 152 LBS | SYSTOLIC BLOOD PRESSURE: 158 MMHG | HEART RATE: 103 BPM | HEIGHT: 62 IN | DIASTOLIC BLOOD PRESSURE: 76 MMHG | BODY MASS INDEX: 27.97 KG/M2

## 2023-09-15 DIAGNOSIS — M85.80 OSTEOPENIA, UNSPECIFIED LOCATION: ICD-10-CM

## 2023-09-15 DIAGNOSIS — E21.3 HYPERPARATHYROIDISM: ICD-10-CM

## 2023-09-15 DIAGNOSIS — E03.8 HYPOTHYROIDISM DUE TO HASHIMOTO'S THYROIDITIS: ICD-10-CM

## 2023-09-15 DIAGNOSIS — M06.00 SERONEGATIVE RHEUMATOID ARTHRITIS: Primary | ICD-10-CM

## 2023-09-15 DIAGNOSIS — E06.3 HYPOTHYROIDISM DUE TO HASHIMOTO'S THYROIDITIS: ICD-10-CM

## 2023-09-15 DIAGNOSIS — R73.9 HYPERGLYCEMIA: ICD-10-CM

## 2023-09-15 PROCEDURE — 99214 OFFICE O/P EST MOD 30 MIN: CPT | Mod: S$GLB,,, | Performed by: PHYSICIAN ASSISTANT

## 2023-09-15 PROCEDURE — 1159F PR MEDICATION LIST DOCUMENTED IN MEDICAL RECORD: ICD-10-PCS | Mod: CPTII,S$GLB,, | Performed by: PHYSICIAN ASSISTANT

## 2023-09-15 PROCEDURE — 99999 PR PBB SHADOW E&M-EST. PATIENT-LVL V: CPT | Mod: PBBFAC,,, | Performed by: PHYSICIAN ASSISTANT

## 2023-09-15 PROCEDURE — 99999 PR PBB SHADOW E&M-EST. PATIENT-LVL V: ICD-10-PCS | Mod: PBBFAC,,, | Performed by: PHYSICIAN ASSISTANT

## 2023-09-15 PROCEDURE — 3078F PR MOST RECENT DIASTOLIC BLOOD PRESSURE < 80 MM HG: ICD-10-PCS | Mod: CPTII,S$GLB,, | Performed by: PHYSICIAN ASSISTANT

## 2023-09-15 PROCEDURE — 3077F PR MOST RECENT SYSTOLIC BLOOD PRESSURE >= 140 MM HG: ICD-10-PCS | Mod: CPTII,S$GLB,, | Performed by: PHYSICIAN ASSISTANT

## 2023-09-15 PROCEDURE — 99214 PR OFFICE/OUTPT VISIT, EST, LEVL IV, 30-39 MIN: ICD-10-PCS | Mod: S$GLB,,, | Performed by: PHYSICIAN ASSISTANT

## 2023-09-15 PROCEDURE — 3077F SYST BP >= 140 MM HG: CPT | Mod: CPTII,S$GLB,, | Performed by: PHYSICIAN ASSISTANT

## 2023-09-15 PROCEDURE — 1125F AMNT PAIN NOTED PAIN PRSNT: CPT | Mod: CPTII,S$GLB,, | Performed by: PHYSICIAN ASSISTANT

## 2023-09-15 PROCEDURE — 3078F DIAST BP <80 MM HG: CPT | Mod: CPTII,S$GLB,, | Performed by: PHYSICIAN ASSISTANT

## 2023-09-15 PROCEDURE — 1159F MED LIST DOCD IN RCRD: CPT | Mod: CPTII,S$GLB,, | Performed by: PHYSICIAN ASSISTANT

## 2023-09-15 PROCEDURE — 1125F PR PAIN SEVERITY QUANTIFIED, PAIN PRESENT: ICD-10-PCS | Mod: CPTII,S$GLB,, | Performed by: PHYSICIAN ASSISTANT

## 2023-09-15 ASSESSMENT — ROUTINE ASSESSMENT OF PATIENT INDEX DATA (RAPID3)
MDHAQ FUNCTION SCORE: 0.6
PATIENT GLOBAL ASSESSMENT SCORE: 2.5
TOTAL RAPID3 SCORE: 2.83
FATIGUE SCORE: 1.1
PAIN SCORE: 4
PSYCHOLOGICAL DISTRESS SCORE: 2.2

## 2023-09-15 NOTE — PROGRESS NOTES
Subjective:       Patient ID: Elham Arteaga is a 77 y.o. female.    Chief Complaint: Disease Management    Mrs. Arteaga is a 77 year old female who presents to clinic for follow up on MCTD and seronegative RA.  She is doing fairly well. She is s/p parathryoidectomy L on 8/23 with Dr. Dixon. She has nearly recovered from surgery and feels her joint pain and brain fog has improved. She does reports some R shoulder pain since her surgery, which she is attributing to positioning during the procedure. She plans to see chiropractor. She has pain in her hips and low back. She may resume PT in the future once she has fully recovered from surgery. She has new burning/sharp pain in her feet.    She is interested in trying LDN for pain and inflammation. She is taking tramadol only 1/2 on rare occasion.     Current tx:  1. Naproxen prn  2. Robaxin prn    Prior tx:  1. Plaquenil  2. mobic   3. Rinvoq          Review of Systems   Constitutional:  Positive for activity change and fatigue. Negative for appetite change, chills and fever.   Eyes:  Negative for visual disturbance.   Respiratory:  Negative for cough and shortness of breath.    Cardiovascular:  Negative for chest pain, palpitations and leg swelling.   Gastrointestinal:  Negative for abdominal pain, constipation, diarrhea, nausea and vomiting.   Musculoskeletal:  Positive for arthralgias, back pain, joint swelling and myalgias.   Allergic/Immunologic: Positive for immunocompromised state.   Neurological:  Negative for dizziness, weakness, light-headedness and headaches.   Psychiatric/Behavioral:  Negative for dysphoric mood. The patient is not nervous/anxious.          Objective:     Vitals:    09/15/23 1108   BP: (!) 158/76   Pulse: 103         Past Medical History:   Diagnosis Date    Acute esophageal obstruction     Asthma     seasonal    Celiac disease 07/2020    Complex tear of lateral meniscus of right knee as current injury 03/28/2017    Depression     Duodenal  ulcer     without hemorrhage or perforation     Dysphagia, unspecified(787.20)     Eosinophilic esophagitis     Fibrocystic breast     Generalized osteoarthrosis, involving multiple sites     GERD (gastroesophageal reflux disease)     Hyperlipidemia     Hyperparathyroidism 2022    Hypothyroidism     Other specified cardiac dysrhythmias(427.89)     PONV (postoperative nausea and vomiting)     Rheumatoid arthritis     Screening for other and unspecified cardiovascular conditions     Sinus problem     Stricture and stenosis of esophagus     Trigger ring finger of right hand     Unspecified asthma(493.90)      Past Surgical History:   Procedure Laterality Date    214.9      ADENOIDECTOMY      ANKLE SURGERY  2009    cyst, left     SECTION, LOW TRANSVERSE      times 1    CHOLECYSTECTOMY      ESOPHAGOGASTRODUODENOSCOPY N/A 2019    Procedure: EGD (ESOPHAGOGASTRODUODENOSCOPY);  Surgeon: Brandon Negron MD;  Location: [facility] ENDO;  Service: Endoscopy;  Laterality: N/A;    HAND SURGERY Left     trigger finger    HERNIA REPAIR      left inguinal    HYSTERECTOMY      OOPHORECTOMY      PARATHYROIDECTOMY Left 2023    Procedure: PARATHYROIDECTOMY;  Surgeon: Haile Dixon MD;  Location: [facility] OR;  Service: ENT;  Laterality: Left;    SHOULDER SURGERY Right 2022    dr ch rotator cuff    TONSILLECTOMY      UPPER GASTROINTESTINAL ENDOSCOPY      VAGINAL DELIVERY      times 2          Physical Exam   Constitutional: She is oriented to person, place, and time.   Eyes: Right conjunctiva is not injected. Left conjunctiva is not injected.   Neck: No JVD present. No thyromegaly present.   Cardiovascular: Normal rate and regular rhythm. Exam reveals no decreased pulses.   Pulmonary/Chest: Effort normal.   Musculoskeletal:      Right shoulder: Tenderness present.   Lymphadenopathy:     She has no cervical adenopathy.   Neurological: She is oriented to person, place, and time. Gait normal.   Skin: No  rash noted.   Psychiatric: Mood and affect normal.       Right Side Rheumatological Exam     The patient is tender to palpation of the shoulder and 1st CMC    The patient has an enlarged 1st PIP, 2nd PIP, 3rd PIP, 4th PIP, 5th PIP, 2nd DIP, 3rd DIP, 4th DIP and 5th DIP    Left Side Rheumatological Exam     Examination finds the 2nd MCP, 3rd MCP, 4th MCP and 5th MCP normal.    The patient is tender to palpation of the 1st CMC.    The patient has an enlarged 1st PIP, 2nd PIP, 3rd PIP, 4th PIP, 5th PIP, 2nd DIP, 3rd DIP, 4th DIP and 5th DIP.          Labs reviewed:  Component      Latest Ref Rng 5/3/2023 8/23/2023   WBC      3.90 - 12.70 K/uL 7.21     RBC      4.00 - 5.40 M/uL 4.83     Hemoglobin      12.0 - 16.0 g/dL 13.3     Hematocrit      37.0 - 48.5 % 42.6     MCV      82 - 98 fL 88     MCH      27.0 - 31.0 pg 27.5     MCHC      32.0 - 36.0 g/dL 31.2 (L)     RDW      11.5 - 14.5 % 13.6     Platelets      150 - 450 K/uL 254     MPV      9.2 - 12.9 fL 9.1 (L)     Immature Granulocytes      0.0 - 0.5 % 0.1     Gran # (ANC)      1.8 - 7.7 K/uL 4.0     Immature Grans (Abs)      0.00 - 0.04 K/uL 0.01     Lymph #      1.0 - 4.8 K/uL 2.3     Mono #      0.3 - 1.0 K/uL 0.5     Eos #      0.0 - 0.5 K/uL 0.4     Baso #      0.00 - 0.20 K/uL 0.04     nRBC      0 /100 WBC 0     Gran %      38.0 - 73.0 % 55.1     Lymph %      18.0 - 48.0 % 31.2     Mono %      4.0 - 15.0 % 7.5     Eosinophil %      0.0 - 8.0 % 5.5     Basophil %      0.0 - 1.9 % 0.6     Differential Method Automated     Sodium      136 - 145 mmol/L 139     Potassium      3.5 - 5.1 mmol/L 5.0     Chloride      95 - 110 mmol/L 106     CO2      23 - 29 mmol/L 25     Glucose      70 - 110 mg/dL 117 (H)     BUN      8 - 23 mg/dL 23     Creatinine      0.5 - 1.4 mg/dL 0.9     Calcium      8.4 - 10.2 mg/dL 9.7  9.1    PROTEIN TOTAL      6.0 - 8.4 g/dL 6.9     Albumin      3.5 - 5.2 g/dL 3.7     BILIRUBIN TOTAL      0.1 - 1.0 mg/dL 0.5     Alkaline Phosphatase      55  - 135 U/L 107     AST      10 - 40 U/L 21     ALT      10 - 44 U/L 24     Anion Gap      8 - 16 mmol/L 8     eGFR      >60 mL/min/1.73 m^2 >60.0     Anti Sm Antibody      0.00 - 0.99 Ratio 0.10     Anti-Sm Interpretation      Negative  Negative     Anti-SSA Antibody      0.00 - 0.99 Ratio 0.09     Anti-SSA Interpretation      Negative  Negative     Anti-SSB Antibody      0.00 - 0.99 Ratio 0.11     Anti-SSB Interpretation      Negative  Negative     ds DNA Ab      Negative 1:10  Negative 1:10     Anti Sm/RNP Antibody      0.00 - 0.99 Ratio 0.17     Anti-Sm/RNP Interpretation      Negative  Negative     Sed Rate      0 - 36 mm/Hr 28     CRP      0.0 - 8.2 mg/L 18.4 (H)     MATEUSZ Screen      Negative <1:80  Positive !     TSH      0.400 - 4.000 uIU/mL 2.559     Free T4      0.71 - 1.51 ng/dL 0.75     MATEUSZ PATTERN 1 Homogeneous     MATEUSZ Titer 1 1:160     PTH      9.0 - 77.0 pg/mL  40.7       Legend:  (L) Low  (H) High  ! Abnormal      Assessment:       1. Seronegative rheumatoid arthritis    2. Hypothyroidism due to Hashimoto's thyroiditis    3. Osteopenia, unspecified location    4. Hyperglycemia    5. Hyperparathyroidism                Plan:       Seronegative rheumatoid arthritis  -     naltrexone capsule; Take 1 capsule (4.5 mg total) by mouth every evening.  Dispense: 30 capsule; Refill: 5  -     C-Reactive Protein; Future; Expected date: 09/15/2023  -     Sedimentation rate; Future; Expected date: 09/15/2023  -     COMPREHENSIVE METABOLIC PANEL; Future; Expected date: 09/15/2023  -     CBC W/ AUTO DIFFERENTIAL; Future; Expected date: 09/15/2023    Hypothyroidism due to Hashimoto's thyroiditis  -     TSH; Future; Expected date: 09/15/2023  -     T4, Free; Future; Expected date: 09/15/2023    Osteopenia, unspecified location  -     Vitamin D; Future; Expected date: 09/15/2023    Hyperglycemia  -     Hemoglobin A1C; Future; Expected date: 09/15/2023    Hyperparathyroidism  -     PTH, intact; Future; Expected date:  09/15/2023            Assessment:  77 year old female with  MCTD MATEUSZ 1:320 speckled, seronegative RA, homogenous, elevated CRP  --elevated PTH  --MTHFR  --NCGS  --Osteoarthritis  --sulfa allergy  --hyperlipidemia, unable to tolerate statins, on lovaza  --osteopenia    Plan:  1. D/C tramadol. Start LDN 4.5 mg nightly. Faxed to [facility]  2. Cont armour thyroid 60 mg  3. Cont naproxen, robaxin prn  4. Check labs in 2 weeks      Follow up:  3-4 mo Dr. De La Torre

## 2023-09-20 ENCOUNTER — LAB VISIT (OUTPATIENT)
Dept: LAB | Facility: HOSPITAL | Age: 78
End: 2023-09-20
Attending: PHYSICIAN ASSISTANT
Payer: MEDICARE

## 2023-09-20 DIAGNOSIS — M85.80 OSTEOPENIA, UNSPECIFIED LOCATION: ICD-10-CM

## 2023-09-20 DIAGNOSIS — E21.3 HYPERPARATHYROIDISM: ICD-10-CM

## 2023-09-20 DIAGNOSIS — R73.9 HYPERGLYCEMIA: ICD-10-CM

## 2023-09-20 DIAGNOSIS — M06.00 SERONEGATIVE RHEUMATOID ARTHRITIS: ICD-10-CM

## 2023-09-20 DIAGNOSIS — E03.8 HYPOTHYROIDISM DUE TO HASHIMOTO'S THYROIDITIS: ICD-10-CM

## 2023-09-20 DIAGNOSIS — E06.3 HYPOTHYROIDISM DUE TO HASHIMOTO'S THYROIDITIS: ICD-10-CM

## 2023-09-20 LAB
25(OH)D3+25(OH)D2 SERPL-MCNC: 56 NG/ML (ref 30–96)
ALBUMIN SERPL BCP-MCNC: 3.9 G/DL (ref 3.5–5.2)
ALP SERPL-CCNC: 114 U/L (ref 55–135)
ALT SERPL W/O P-5'-P-CCNC: 23 U/L (ref 10–44)
ANION GAP SERPL CALC-SCNC: 8 MMOL/L (ref 8–16)
AST SERPL-CCNC: 22 U/L (ref 10–40)
BASOPHILS # BLD AUTO: 0.04 K/UL (ref 0–0.2)
BASOPHILS NFR BLD: 0.6 % (ref 0–1.9)
BILIRUB SERPL-MCNC: 0.5 MG/DL (ref 0.1–1)
BUN SERPL-MCNC: 12 MG/DL (ref 8–23)
CALCIUM SERPL-MCNC: 9.7 MG/DL (ref 8.7–10.5)
CHLORIDE SERPL-SCNC: 106 MMOL/L (ref 95–110)
CO2 SERPL-SCNC: 28 MMOL/L (ref 23–29)
CREAT SERPL-MCNC: 0.9 MG/DL (ref 0.5–1.4)
CRP SERPL-MCNC: 7.9 MG/L (ref 0–8.2)
DIFFERENTIAL METHOD: NORMAL
EOSINOPHIL # BLD AUTO: 0.2 K/UL (ref 0–0.5)
EOSINOPHIL NFR BLD: 3 % (ref 0–8)
ERYTHROCYTE [DISTWIDTH] IN BLOOD BY AUTOMATED COUNT: 13.8 % (ref 11.5–14.5)
ERYTHROCYTE [SEDIMENTATION RATE] IN BLOOD BY PHOTOMETRIC METHOD: 37 MM/HR (ref 0–36)
EST. GFR  (NO RACE VARIABLE): >60 ML/MIN/1.73 M^2
ESTIMATED AVG GLUCOSE: 126 MG/DL (ref 68–131)
GLUCOSE SERPL-MCNC: 105 MG/DL (ref 70–110)
HBA1C MFR BLD: 6 % (ref 4–5.6)
HCT VFR BLD AUTO: 41.9 % (ref 37–48.5)
HGB BLD-MCNC: 13.5 G/DL (ref 12–16)
IMM GRANULOCYTES # BLD AUTO: 0.01 K/UL (ref 0–0.04)
IMM GRANULOCYTES NFR BLD AUTO: 0.1 % (ref 0–0.5)
LYMPHOCYTES # BLD AUTO: 2.2 K/UL (ref 1–4.8)
LYMPHOCYTES NFR BLD: 31.3 % (ref 18–48)
MCH RBC QN AUTO: 28.4 PG (ref 27–31)
MCHC RBC AUTO-ENTMCNC: 32.2 G/DL (ref 32–36)
MCV RBC AUTO: 88 FL (ref 82–98)
MONOCYTES # BLD AUTO: 0.4 K/UL (ref 0.3–1)
MONOCYTES NFR BLD: 6 % (ref 4–15)
NEUTROPHILS # BLD AUTO: 4.2 K/UL (ref 1.8–7.7)
NEUTROPHILS NFR BLD: 59 % (ref 38–73)
NRBC BLD-RTO: 0 /100 WBC
PLATELET # BLD AUTO: 282 K/UL (ref 150–450)
PMV BLD AUTO: 9.5 FL (ref 9.2–12.9)
POTASSIUM SERPL-SCNC: 4.2 MMOL/L (ref 3.5–5.1)
PROT SERPL-MCNC: 7.2 G/DL (ref 6–8.4)
PTH-INTACT SERPL-MCNC: 65.3 PG/ML (ref 9–77)
RBC # BLD AUTO: 4.75 M/UL (ref 4–5.4)
SODIUM SERPL-SCNC: 142 MMOL/L (ref 136–145)
T4 FREE SERPL-MCNC: 0.61 NG/DL (ref 0.71–1.51)
TSH SERPL DL<=0.005 MIU/L-ACNC: 1.84 UIU/ML (ref 0.4–4)
WBC # BLD AUTO: 7.04 K/UL (ref 3.9–12.7)

## 2023-09-20 PROCEDURE — 83036 HEMOGLOBIN GLYCOSYLATED A1C: CPT | Performed by: PHYSICIAN ASSISTANT

## 2023-09-20 PROCEDURE — 84443 ASSAY THYROID STIM HORMONE: CPT | Performed by: PHYSICIAN ASSISTANT

## 2023-09-20 PROCEDURE — 36415 COLL VENOUS BLD VENIPUNCTURE: CPT | Mod: PO | Performed by: PHYSICIAN ASSISTANT

## 2023-09-20 PROCEDURE — 83970 ASSAY OF PARATHORMONE: CPT | Performed by: PHYSICIAN ASSISTANT

## 2023-09-20 PROCEDURE — 86140 C-REACTIVE PROTEIN: CPT | Performed by: PHYSICIAN ASSISTANT

## 2023-09-20 PROCEDURE — 82306 VITAMIN D 25 HYDROXY: CPT | Performed by: PHYSICIAN ASSISTANT

## 2023-09-20 PROCEDURE — 80053 COMPREHEN METABOLIC PANEL: CPT | Performed by: PHYSICIAN ASSISTANT

## 2023-09-20 PROCEDURE — 84439 ASSAY OF FREE THYROXINE: CPT | Performed by: PHYSICIAN ASSISTANT

## 2023-09-20 PROCEDURE — 85652 RBC SED RATE AUTOMATED: CPT | Performed by: PHYSICIAN ASSISTANT

## 2023-09-20 PROCEDURE — 85025 COMPLETE CBC W/AUTO DIFF WBC: CPT | Performed by: PHYSICIAN ASSISTANT

## 2023-10-16 ENCOUNTER — PATIENT MESSAGE (OUTPATIENT)
Dept: RHEUMATOLOGY | Facility: CLINIC | Age: 78
End: 2023-10-16
Payer: MEDICARE

## 2023-10-18 PROBLEM — I45.10 RBBB: Status: ACTIVE | Noted: 2023-10-18

## 2023-10-18 PROBLEM — Z98.890 HISTORY OF PARATHYROID SURGERY: Status: ACTIVE | Noted: 2023-10-18

## 2023-10-27 PROBLEM — Z78.9 STATIN INTOLERANCE: Status: ACTIVE | Noted: 2023-10-27

## 2023-12-28 ENCOUNTER — TELEPHONE (OUTPATIENT)
Dept: ENDOCRINOLOGY | Facility: CLINIC | Age: 78
End: 2023-12-28
Payer: MEDICARE

## 2023-12-28 NOTE — TELEPHONE ENCOUNTER
----- Message from Yee Marx sent at 12/28/2023  1:01 PM CST -----  Elham Arteaga calling regarding Appointment Access for wanting to be seen for having Hyperparathyroidism, call back to inform the PT if there is a doctor at the [facility] that can treat this [facility], [phone]

## 2024-01-05 ENCOUNTER — OFFICE VISIT (OUTPATIENT)
Dept: RHEUMATOLOGY | Facility: CLINIC | Age: 79
End: 2024-01-05
Payer: MEDICARE

## 2024-01-05 VITALS
HEIGHT: 62 IN | BODY MASS INDEX: 27.63 KG/M2 | WEIGHT: 150.13 LBS | SYSTOLIC BLOOD PRESSURE: 172 MMHG | DIASTOLIC BLOOD PRESSURE: 84 MMHG | HEART RATE: 101 BPM

## 2024-01-05 DIAGNOSIS — M16.12 PRIMARY OSTEOARTHRITIS OF LEFT HIP: ICD-10-CM

## 2024-01-05 DIAGNOSIS — M85.80 OSTEOPENIA, UNSPECIFIED LOCATION: ICD-10-CM

## 2024-01-05 DIAGNOSIS — M06.00 SERONEGATIVE RHEUMATOID ARTHRITIS: Primary | ICD-10-CM

## 2024-01-05 DIAGNOSIS — K20.0 EOSINOPHILIC ESOPHAGITIS: ICD-10-CM

## 2024-01-05 DIAGNOSIS — E03.8 HYPOTHYROIDISM DUE TO HASHIMOTO'S THYROIDITIS: ICD-10-CM

## 2024-01-05 DIAGNOSIS — E06.3 HYPOTHYROIDISM DUE TO HASHIMOTO'S THYROIDITIS: ICD-10-CM

## 2024-01-05 PROCEDURE — 1160F RVW MEDS BY RX/DR IN RCRD: CPT | Mod: CPTII,S$GLB,, | Performed by: INTERNAL MEDICINE

## 2024-01-05 PROCEDURE — 3288F FALL RISK ASSESSMENT DOCD: CPT | Mod: CPTII,S$GLB,, | Performed by: INTERNAL MEDICINE

## 2024-01-05 PROCEDURE — 3077F SYST BP >= 140 MM HG: CPT | Mod: CPTII,S$GLB,, | Performed by: INTERNAL MEDICINE

## 2024-01-05 PROCEDURE — 99214 OFFICE O/P EST MOD 30 MIN: CPT | Mod: 25,S$GLB,, | Performed by: INTERNAL MEDICINE

## 2024-01-05 PROCEDURE — 1101F PT FALLS ASSESS-DOCD LE1/YR: CPT | Mod: CPTII,S$GLB,, | Performed by: INTERNAL MEDICINE

## 2024-01-05 PROCEDURE — 99999 PR PBB SHADOW E&M-EST. PATIENT-LVL IV: CPT | Mod: PBBFAC,,, | Performed by: INTERNAL MEDICINE

## 2024-01-05 PROCEDURE — 1125F AMNT PAIN NOTED PAIN PRSNT: CPT | Mod: CPTII,S$GLB,, | Performed by: INTERNAL MEDICINE

## 2024-01-05 PROCEDURE — 96372 THER/PROPH/DIAG INJ SC/IM: CPT | Mod: S$GLB,,, | Performed by: INTERNAL MEDICINE

## 2024-01-05 PROCEDURE — 1159F MED LIST DOCD IN RCRD: CPT | Mod: CPTII,S$GLB,, | Performed by: INTERNAL MEDICINE

## 2024-01-05 PROCEDURE — 3079F DIAST BP 80-89 MM HG: CPT | Mod: CPTII,S$GLB,, | Performed by: INTERNAL MEDICINE

## 2024-01-05 RX ORDER — CYANOCOBALAMIN 1000 UG/ML
1000 INJECTION, SOLUTION INTRAMUSCULAR; SUBCUTANEOUS
Status: COMPLETED | OUTPATIENT
Start: 2024-01-05 | End: 2024-01-05

## 2024-01-05 RX ORDER — KETOROLAC TROMETHAMINE 30 MG/ML
60 INJECTION, SOLUTION INTRAMUSCULAR; INTRAVENOUS
Status: COMPLETED | OUTPATIENT
Start: 2024-01-05 | End: 2024-01-05

## 2024-01-05 RX ORDER — NAPROXEN 375 MG/1
375 TABLET ORAL 2 TIMES DAILY WITH MEALS
Qty: 60 TABLET | Refills: 5 | Status: SHIPPED | OUTPATIENT
Start: 2024-01-05 | End: 2024-07-03

## 2024-01-05 RX ORDER — METHYLPREDNISOLONE ACETATE 80 MG/ML
80 INJECTION, SUSPENSION INTRA-ARTICULAR; INTRALESIONAL; INTRAMUSCULAR; SOFT TISSUE
Status: COMPLETED | OUTPATIENT
Start: 2024-01-05 | End: 2024-01-05

## 2024-01-05 RX ADMIN — KETOROLAC TROMETHAMINE 60 MG: 30 INJECTION, SOLUTION INTRAMUSCULAR; INTRAVENOUS at 01:01

## 2024-01-05 RX ADMIN — METHYLPREDNISOLONE ACETATE 80 MG: 80 INJECTION, SUSPENSION INTRA-ARTICULAR; INTRALESIONAL; INTRAMUSCULAR; SOFT TISSUE at 02:01

## 2024-01-05 RX ADMIN — CYANOCOBALAMIN 1000 MCG: 1000 INJECTION, SOLUTION INTRAMUSCULAR; SUBCUTANEOUS at 02:01

## 2024-01-05 ASSESSMENT — ROUTINE ASSESSMENT OF PATIENT INDEX DATA (RAPID3)
MDHAQ FUNCTION SCORE: 1.1
PAIN SCORE: 5
PSYCHOLOGICAL DISTRESS SCORE: 2.2
TOTAL RAPID3 SCORE: 3.89
PATIENT GLOBAL ASSESSMENT SCORE: 3
FATIGUE SCORE: 1.1

## 2024-01-05 NOTE — PROGRESS NOTES
Subjective:       Patient ID: Elham Arteaga is a 78 y.o. female.    Chief Complaint: Disease Management    Mrs. Arteaga is a 78 year old female who presents to clinic for follow up on MCTD and seronegative RA.  She is doing fairly well. She is s/p parathryoidectomy L on 8/23 with Dr. Dixon. She has nearly recovered from surgery and feels her joint pain and brain fog has improved. She does reports some R shoulder pain since her surgery, which she is attributing to positioning during the procedure. She plans to see chiropractor. She has pain in her hips and low back. She may resume PT in the future once she has fully recovered from surgery. She has new burning/sharp pain in her feet.    She is interested in trying LDN for pain and inflammation. She is taking tramadol only 1/2 on rare occasion.     Current tx:  1. Naproxen prn  2. Robaxin prn    Prior tx:  1. Plaquenil  2. mobic   3. Rinvoq          Review of Systems   Constitutional:  Positive for activity change. Negative for appetite change and chills.   Eyes:  Negative for visual disturbance.   Respiratory:  Negative for cough and shortness of breath.    Cardiovascular:  Negative for chest pain, palpitations and leg swelling.   Gastrointestinal:  Negative for abdominal pain, constipation, diarrhea, nausea and vomiting.   Musculoskeletal:  Positive for arthralgias and back pain.   Allergic/Immunologic: Positive for immunocompromised state.   Neurological:  Negative for dizziness, weakness and light-headedness.   Psychiatric/Behavioral:  Negative for dysphoric mood. The patient is not nervous/anxious.          Objective:     Vitals:    01/05/24 1304   BP: (!) 172/84   Pulse: 101         Past Medical History:   Diagnosis Date    Acute esophageal obstruction     Asthma     seasonal    Celiac disease 07/2020    Complex tear of lateral meniscus of right knee as current injury 03/28/2017    Depression     Duodenal ulcer     without hemorrhage or perforation      Dysphagia, unspecified(787.20)     Eosinophilic esophagitis     Fibrocystic breast     Generalized osteoarthrosis, involving multiple sites     GERD (gastroesophageal reflux disease)     Hyperlipidemia     Hyperparathyroidism 2022    Hypothyroidism     Other specified cardiac dysrhythmias(427.89)     PONV (postoperative nausea and vomiting)     Rheumatoid arthritis     Screening for other and unspecified cardiovascular conditions     Sinus problem     Stricture and stenosis of esophagus     Trigger ring finger of right hand     Unspecified asthma(493.90)      Past Surgical History:   Procedure Laterality Date    214.9      ADENOIDECTOMY      ANKLE SURGERY  2009    cyst, left     SECTION, LOW TRANSVERSE      times 1    CHOLECYSTECTOMY      ESOPHAGOGASTRODUODENOSCOPY N/A 2019    Procedure: EGD (ESOPHAGOGASTRODUODENOSCOPY);  Surgeon: Brandon Negron MD;  Location: [facility] ENDO;  Service: Endoscopy;  Laterality: N/A;    HAND SURGERY Left     trigger finger    HERNIA REPAIR      left inguinal    HYSTERECTOMY      OOPHORECTOMY      PARATHYROIDECTOMY Left 2023    Procedure: PARATHYROIDECTOMY;  Surgeon: Haile Dixon MD;  Location: [facility] OR;  Service: ENT;  Laterality: Left;    SHOULDER SURGERY Right 2022    dr ch rotator cuff    TONSILLECTOMY      UPPER GASTROINTESTINAL ENDOSCOPY      VAGINAL DELIVERY      times 2          Physical Exam   Constitutional: She is oriented to person, place, and time.   Eyes: Right conjunctiva is not injected. Left conjunctiva is not injected.   Neck: No JVD present. No thyromegaly present.   Cardiovascular: Normal rate and regular rhythm. Exam reveals no decreased pulses.   Pulmonary/Chest: Effort normal.   Musculoskeletal:      Right shoulder: Tenderness present.   Lymphadenopathy:     She has no cervical adenopathy.   Neurological: She is oriented to person, place, and time. Gait normal.   Skin: No rash noted.   Psychiatric: Mood and affect normal.        Right Side Rheumatological Exam     The patient is tender to palpation of the shoulder and 1st CMC    The patient has an enlarged 1st PIP, 2nd PIP, 3rd PIP, 4th PIP, 5th PIP, 2nd DIP, 3rd DIP, 4th DIP and 5th DIP    Left Side Rheumatological Exam     Examination finds the 2nd MCP, 3rd MCP, 4th MCP and 5th MCP normal.    The patient is tender to palpation of the 1st CMC.    The patient has an enlarged 1st PIP, 2nd PIP, 3rd PIP, 4th PIP, 5th PIP, 2nd DIP, 3rd DIP, 4th DIP and 5th DIP.            Results for orders placed or performed in visit on 11/29/23   Lipid Panel   Result Value Ref Range    Cholesterol 245 (H) 120 - 199 mg/dL    Triglycerides 119 30 - 150 mg/dL    HDL 57 40 - 75 mg/dL    LDL Cholesterol 164.2 (H) 63.0 - 159.0 mg/dL    HDL/Cholesterol Ratio 23.3 20.0 - 50.0 %    Total Cholesterol/HDL Ratio 4.3 2.0 - 5.0    Non-HDL Cholesterol 188 mg/dL   Comprehensive Metabolic Panel   Result Value Ref Range    Sodium 143 136 - 145 mmol/L    Potassium 4.5 3.5 - 5.1 mmol/L    Chloride 106 95 - 110 mmol/L    CO2 26 22 - 31 mmol/L    Glucose 117 (H) 70 - 110 mg/dL    BUN 18 7 - 18 mg/dL    Creatinine 0.79 0.50 - 1.40 mg/dL    Calcium 9.2 8.4 - 10.2 mg/dL    Total Protein 7.0 6.0 - 8.4 g/dL    Albumin 4.5 3.5 - 5.2 g/dL    Total Bilirubin 0.8 0.2 - 1.3 mg/dL    Alkaline Phosphatase 128 38 - 145 U/L    AST 32 14 - 36 U/L    ALT 27 0 - 35 U/L    Anion Gap 11 5 - 12 mmol/L    eGFR >60 >60 mL/min/1.73 m^2   CK   Result Value Ref Range    CPK 70 55 - 170 U/L   Homocysteine, Serum   Result Value Ref Range    Homocysteine 7.7 4.0 - 15.5 umol/L         Assessment:       1. Seronegative rheumatoid arthritis    2. Hypothyroidism due to Hashimoto's thyroiditis    3. Osteopenia, unspecified location    4. Primary osteoarthritis of left hip    5. Eosinophilic esophagitis                  Plan:       Seronegative rheumatoid arthritis  -     methylPREDNISolone acetate injection 80 mg  -     ketorolac injection 60 mg  -      cyanocobalamin injection 1,000 mcg  -     T4, Free; Future; Expected date: 01/05/2024  -     TSH; Future; Expected date: 01/05/2024  -     Vitamin D; Future; Expected date: 01/05/2024  -     Cyclic Citrullinated Peptide Antibody, IgG; Future; Expected date: 01/05/2024  -     Rheumatoid Factor; Future; Expected date: 01/05/2024  -     Sedimentation rate; Future; Expected date: 01/05/2024  -     C-Reactive Protein; Future; Expected date: 01/05/2024  -     Comprehensive Metabolic Panel; Future; Expected date: 01/05/2024  -     CBC Auto Differential; Future; Expected date: 01/05/2024  -     T3, FREE; Future; Expected date: 01/05/2024    Hypothyroidism due to Hashimoto's thyroiditis  -     methylPREDNISolone acetate injection 80 mg  -     ketorolac injection 60 mg  -     cyanocobalamin injection 1,000 mcg  -     T4, Free; Future; Expected date: 01/05/2024  -     TSH; Future; Expected date: 01/05/2024  -     Vitamin D; Future; Expected date: 01/05/2024  -     Cyclic Citrullinated Peptide Antibody, IgG; Future; Expected date: 01/05/2024  -     Rheumatoid Factor; Future; Expected date: 01/05/2024  -     Sedimentation rate; Future; Expected date: 01/05/2024  -     C-Reactive Protein; Future; Expected date: 01/05/2024  -     Comprehensive Metabolic Panel; Future; Expected date: 01/05/2024  -     CBC Auto Differential; Future; Expected date: 01/05/2024  -     T3, FREE; Future; Expected date: 01/05/2024    Osteopenia, unspecified location  -     methylPREDNISolone acetate injection 80 mg  -     ketorolac injection 60 mg  -     cyanocobalamin injection 1,000 mcg  -     T4, Free; Future; Expected date: 01/05/2024  -     TSH; Future; Expected date: 01/05/2024  -     Vitamin D; Future; Expected date: 01/05/2024  -     Cyclic Citrullinated Peptide Antibody, IgG; Future; Expected date: 01/05/2024  -     Rheumatoid Factor; Future; Expected date: 01/05/2024  -     Sedimentation rate; Future; Expected date: 01/05/2024  -     C-Reactive  Protein; Future; Expected date: 01/05/2024  -     Comprehensive Metabolic Panel; Future; Expected date: 01/05/2024  -     CBC Auto Differential; Future; Expected date: 01/05/2024  -     T3, FREE; Future; Expected date: 01/05/2024    Primary osteoarthritis of left hip  -     methylPREDNISolone acetate injection 80 mg  -     ketorolac injection 60 mg  -     cyanocobalamin injection 1,000 mcg  -     T4, Free; Future; Expected date: 01/05/2024  -     TSH; Future; Expected date: 01/05/2024  -     Vitamin D; Future; Expected date: 01/05/2024  -     Cyclic Citrullinated Peptide Antibody, IgG; Future; Expected date: 01/05/2024  -     Rheumatoid Factor; Future; Expected date: 01/05/2024  -     Sedimentation rate; Future; Expected date: 01/05/2024  -     C-Reactive Protein; Future; Expected date: 01/05/2024  -     Comprehensive Metabolic Panel; Future; Expected date: 01/05/2024  -     CBC Auto Differential; Future; Expected date: 01/05/2024  -     T3, FREE; Future; Expected date: 01/05/2024    Eosinophilic esophagitis  -     methylPREDNISolone acetate injection 80 mg  -     ketorolac injection 60 mg  -     cyanocobalamin injection 1,000 mcg  -     T4, Free; Future; Expected date: 01/05/2024  -     TSH; Future; Expected date: 01/05/2024  -     Vitamin D; Future; Expected date: 01/05/2024  -     Cyclic Citrullinated Peptide Antibody, IgG; Future; Expected date: 01/05/2024  -     Rheumatoid Factor; Future; Expected date: 01/05/2024  -     Sedimentation rate; Future; Expected date: 01/05/2024  -     C-Reactive Protein; Future; Expected date: 01/05/2024  -     Comprehensive Metabolic Panel; Future; Expected date: 01/05/2024  -     CBC Auto Differential; Future; Expected date: 01/05/2024  -     T3, FREE; Future; Expected date: 01/05/2024              Assessment:  77 year old female with  MCTD MATEUSZ 1:320 speckled, seronegative RA, homogenous, elevated CRP  --elevated PTH  --MTHFR  --NCGS  --Osteoarthritis  --sulfa  allergy  --hyperlipidemia, unable to tolerate statins, on lovaza  --osteopenia    Plan:  1. She is  taking tramadol prn    2. Cont armour thyroid 60 mg  3. Cont naproxen, robaxin prn  4. Labs prior to visit  More than 50% of the  40 minute encounter was spent face to face counseling the patient regarding current status and future plan of care as well as side effects  of the medications. All questions were answered to patient's satisfaction also includes  non-face to face time preparing to see the patient (eg, review of tests), Obtaining and/or reviewing separately obtained history, Documenting clinical information in the electronic or other health record, Independently interpreting results

## 2024-01-08 DIAGNOSIS — E06.3 HASHIMOTO'S THYROIDITIS: ICD-10-CM

## 2024-01-09 RX ORDER — SULFAMETHOXAZOLE AND TRIMETHOPRIM 800; 160 MG/1; MG/1
60 TABLET ORAL
Qty: 90 TABLET | Refills: 1 | Status: SHIPPED | OUTPATIENT
Start: 2024-01-09

## 2024-01-26 ENCOUNTER — LAB VISIT (OUTPATIENT)
Dept: LAB | Facility: HOSPITAL | Age: 79
End: 2024-01-26
Attending: INTERNAL MEDICINE
Payer: MEDICARE

## 2024-01-26 DIAGNOSIS — M06.00 SERONEGATIVE RHEUMATOID ARTHRITIS: ICD-10-CM

## 2024-01-26 DIAGNOSIS — K20.0 EOSINOPHILIC ESOPHAGITIS: ICD-10-CM

## 2024-01-26 DIAGNOSIS — E03.8 HYPOTHYROIDISM DUE TO HASHIMOTO'S THYROIDITIS: ICD-10-CM

## 2024-01-26 DIAGNOSIS — M85.80 OSTEOPENIA, UNSPECIFIED LOCATION: ICD-10-CM

## 2024-01-26 DIAGNOSIS — M16.12 PRIMARY OSTEOARTHRITIS OF LEFT HIP: ICD-10-CM

## 2024-01-26 DIAGNOSIS — E06.3 HYPOTHYROIDISM DUE TO HASHIMOTO'S THYROIDITIS: ICD-10-CM

## 2024-01-26 LAB
25(OH)D3+25(OH)D2 SERPL-MCNC: 44 NG/ML (ref 30–96)
ALBUMIN SERPL BCP-MCNC: 3.8 G/DL (ref 3.5–5.2)
ALP SERPL-CCNC: 119 U/L (ref 55–135)
ALT SERPL W/O P-5'-P-CCNC: 20 U/L (ref 10–44)
ANION GAP SERPL CALC-SCNC: 8 MMOL/L (ref 8–16)
AST SERPL-CCNC: 24 U/L (ref 10–40)
BASOPHILS # BLD AUTO: 0.07 K/UL (ref 0–0.2)
BASOPHILS NFR BLD: 0.7 % (ref 0–1.9)
BILIRUB SERPL-MCNC: 0.8 MG/DL (ref 0.1–1)
BUN SERPL-MCNC: 22 MG/DL (ref 8–23)
CALCIUM SERPL-MCNC: 9.2 MG/DL (ref 8.7–10.5)
CCP AB SER IA-ACNC: 0.5 U/ML
CHLORIDE SERPL-SCNC: 104 MMOL/L (ref 95–110)
CO2 SERPL-SCNC: 27 MMOL/L (ref 23–29)
CREAT SERPL-MCNC: 0.8 MG/DL (ref 0.5–1.4)
CRP SERPL-MCNC: 9.2 MG/L (ref 0–8.2)
DIFFERENTIAL METHOD BLD: ABNORMAL
EOSINOPHIL # BLD AUTO: 0.3 K/UL (ref 0–0.5)
EOSINOPHIL NFR BLD: 2.7 % (ref 0–8)
ERYTHROCYTE [DISTWIDTH] IN BLOOD BY AUTOMATED COUNT: 13.7 % (ref 11.5–14.5)
ERYTHROCYTE [SEDIMENTATION RATE] IN BLOOD BY PHOTOMETRIC METHOD: 37 MM/HR (ref 0–36)
EST. GFR  (NO RACE VARIABLE): >60 ML/MIN/1.73 M^2
GLUCOSE SERPL-MCNC: 106 MG/DL (ref 70–110)
HCT VFR BLD AUTO: 41.7 % (ref 37–48.5)
HGB BLD-MCNC: 12.8 G/DL (ref 12–16)
IMM GRANULOCYTES # BLD AUTO: 0.03 K/UL (ref 0–0.04)
IMM GRANULOCYTES NFR BLD AUTO: 0.3 % (ref 0–0.5)
LYMPHOCYTES # BLD AUTO: 2.4 K/UL (ref 1–4.8)
LYMPHOCYTES NFR BLD: 22.6 % (ref 18–48)
MCH RBC QN AUTO: 27.9 PG (ref 27–31)
MCHC RBC AUTO-ENTMCNC: 30.7 G/DL (ref 32–36)
MCV RBC AUTO: 91 FL (ref 82–98)
MONOCYTES # BLD AUTO: 0.6 K/UL (ref 0.3–1)
MONOCYTES NFR BLD: 5.6 % (ref 4–15)
NEUTROPHILS # BLD AUTO: 7.2 K/UL (ref 1.8–7.7)
NEUTROPHILS NFR BLD: 68.1 % (ref 38–73)
NRBC BLD-RTO: 0 /100 WBC
PLATELET # BLD AUTO: 268 K/UL (ref 150–450)
PMV BLD AUTO: 9.4 FL (ref 9.2–12.9)
POTASSIUM SERPL-SCNC: 4.3 MMOL/L (ref 3.5–5.1)
PROT SERPL-MCNC: 6.9 G/DL (ref 6–8.4)
RBC # BLD AUTO: 4.59 M/UL (ref 4–5.4)
RHEUMATOID FACT SERPL-ACNC: <13 IU/ML (ref 0–15)
SODIUM SERPL-SCNC: 139 MMOL/L (ref 136–145)
T3FREE SERPL-MCNC: 3.9 PG/ML (ref 2.3–4.2)
T4 FREE SERPL-MCNC: 0.87 NG/DL (ref 0.71–1.51)
TSH SERPL DL<=0.005 MIU/L-ACNC: 0.69 UIU/ML (ref 0.4–4)
WBC # BLD AUTO: 10.54 K/UL (ref 3.9–12.7)

## 2024-01-26 PROCEDURE — 36415 COLL VENOUS BLD VENIPUNCTURE: CPT | Mod: PO | Performed by: INTERNAL MEDICINE

## 2024-01-26 PROCEDURE — 86200 CCP ANTIBODY: CPT | Performed by: INTERNAL MEDICINE

## 2024-01-26 PROCEDURE — 85025 COMPLETE CBC W/AUTO DIFF WBC: CPT | Performed by: INTERNAL MEDICINE

## 2024-01-26 PROCEDURE — 82306 VITAMIN D 25 HYDROXY: CPT | Performed by: INTERNAL MEDICINE

## 2024-01-26 PROCEDURE — 85652 RBC SED RATE AUTOMATED: CPT | Performed by: INTERNAL MEDICINE

## 2024-01-26 PROCEDURE — 86431 RHEUMATOID FACTOR QUANT: CPT | Performed by: INTERNAL MEDICINE

## 2024-01-26 PROCEDURE — 84481 FREE ASSAY (FT-3): CPT | Performed by: INTERNAL MEDICINE

## 2024-01-26 PROCEDURE — 84439 ASSAY OF FREE THYROXINE: CPT | Performed by: INTERNAL MEDICINE

## 2024-01-26 PROCEDURE — 86140 C-REACTIVE PROTEIN: CPT | Performed by: INTERNAL MEDICINE

## 2024-01-26 PROCEDURE — 84443 ASSAY THYROID STIM HORMONE: CPT | Performed by: INTERNAL MEDICINE

## 2024-01-26 PROCEDURE — 80053 COMPREHEN METABOLIC PANEL: CPT | Performed by: INTERNAL MEDICINE

## 2024-03-12 ENCOUNTER — TELEPHONE (OUTPATIENT)
Dept: DERMATOLOGY | Facility: CLINIC | Age: 79
End: 2024-03-12
Payer: MEDICARE

## 2024-03-12 NOTE — TELEPHONE ENCOUNTER
----- Message from Sravanthi Hernandez sent at 3/12/2024  2:27 PM CDT -----  Type:  Sooner Appointment Request    Caller is requesting a sooner appointment.  Caller declined first available appointment listed below.  Caller will not accept being placed on the waitlist and is requesting a message be sent to doctor.    Name of Caller:  pt  When is the first available appointment?  N/A  Symptoms:  spots on face and back, skin check for whole body  Would the patient rather a call back or a response via [facility]? Call back  Best Call Back Number:  [phone]  Additional Information:  pt states that first available does not work for them and needs to be seen sooner to est care with a dermatologist. Pt is ok to see whoever has the soonest appt but only wants to be seen by a female provider. Please call back and advise. Thanks!

## 2024-04-28 PROBLEM — E83.52 HYPERCALCEMIA: Chronic | Status: ACTIVE | Noted: 2019-08-23

## 2024-04-28 PROBLEM — M81.0 OSTEOPOROSIS: Chronic | Status: ACTIVE | Noted: 2023-04-17

## 2024-04-28 PROBLEM — E78.00 PURE HYPERCHOLESTEROLEMIA: Chronic | Status: ACTIVE | Noted: 2023-04-17

## 2024-05-01 ENCOUNTER — TELEPHONE (OUTPATIENT)
Dept: NEUROLOGY | Facility: CLINIC | Age: 79
End: 2024-05-01
Payer: MEDICARE

## 2024-05-02 ENCOUNTER — TELEPHONE (OUTPATIENT)
Dept: NEUROLOGY | Facility: CLINIC | Age: 79
End: 2024-05-02
Payer: MEDICARE

## 2024-05-02 NOTE — TELEPHONE ENCOUNTER
Spoke to the pt, appt scheduled on 5/6/24 at 1400 with Dr. Joiner for memory.  Date, time and location discussed.

## 2024-05-02 NOTE — TELEPHONE ENCOUNTER
----- Message from Garcia Lopez sent at 5/2/2024  8:49 AM CDT -----  Regarding: ret call  Contact: DORINA BAZAN [[id number]]  Type:  Patient Returning Call    Who Called:  Dorina    Who Left Message for Patient:  Sarah    Does the patient know what this is regarding?:  Yes-New Memory    Best Call Back Number:  [phone]    Additional Information:  Please call to advise.

## 2024-05-22 ENCOUNTER — TELEPHONE (OUTPATIENT)
Dept: RHEUMATOLOGY | Facility: CLINIC | Age: 79
End: 2024-05-22
Payer: MEDICARE

## 2024-05-22 NOTE — TELEPHONE ENCOUNTER
----- Message from Liza Quintero sent at 5/22/2024  9:50 AM CDT -----  Contact: pt  Type:  Needs Medical Advice    Who Called: pt    Would the patient rather a call back or a response via [facility]? Call back    Best Call Back Number: [phone]    Additional Information: needs to have recent labs and clinical note sent to Dr SMOOTH Hess with [facility]    Fax # [phone] - back line  Or   [facility]# [phone] - front line    Please advise  Thanks

## 2024-06-28 ENCOUNTER — PATIENT MESSAGE (OUTPATIENT)
Dept: RHEUMATOLOGY | Facility: CLINIC | Age: 79
End: 2024-06-28
Payer: MEDICARE

## 2024-06-28 DIAGNOSIS — M35.1 MCTD (MIXED CONNECTIVE TISSUE DISEASE): ICD-10-CM

## 2024-06-28 DIAGNOSIS — M06.00 SERONEGATIVE RHEUMATOID ARTHRITIS: Primary | ICD-10-CM

## 2024-07-03 ENCOUNTER — LAB VISIT (OUTPATIENT)
Dept: LAB | Facility: HOSPITAL | Age: 79
End: 2024-07-03
Attending: PHYSICIAN ASSISTANT
Payer: MEDICARE

## 2024-07-03 DIAGNOSIS — M35.1 MCTD (MIXED CONNECTIVE TISSUE DISEASE): ICD-10-CM

## 2024-07-03 DIAGNOSIS — M06.00 SERONEGATIVE RHEUMATOID ARTHRITIS: ICD-10-CM

## 2024-07-03 LAB
ALBUMIN SERPL BCP-MCNC: 4 G/DL (ref 3.5–5.2)
ALP SERPL-CCNC: 112 U/L (ref 55–135)
ALT SERPL W/O P-5'-P-CCNC: 22 U/L (ref 10–44)
ANION GAP SERPL CALC-SCNC: 10 MMOL/L (ref 8–16)
AST SERPL-CCNC: 22 U/L (ref 10–40)
BASOPHILS # BLD AUTO: 0.08 K/UL (ref 0–0.2)
BASOPHILS NFR BLD: 0.8 % (ref 0–1.9)
BILIRUB SERPL-MCNC: 0.5 MG/DL (ref 0.1–1)
BUN SERPL-MCNC: 23 MG/DL (ref 8–23)
CALCIUM SERPL-MCNC: 10.3 MG/DL (ref 8.7–10.5)
CHLORIDE SERPL-SCNC: 104 MMOL/L (ref 95–110)
CO2 SERPL-SCNC: 25 MMOL/L (ref 23–29)
CREAT SERPL-MCNC: 1 MG/DL (ref 0.5–1.4)
CRP SERPL-MCNC: 10.5 MG/L (ref 0–8.2)
DIFFERENTIAL METHOD BLD: ABNORMAL
EOSINOPHIL # BLD AUTO: 0.4 K/UL (ref 0–0.5)
EOSINOPHIL NFR BLD: 3.6 % (ref 0–8)
ERYTHROCYTE [DISTWIDTH] IN BLOOD BY AUTOMATED COUNT: 14.1 % (ref 11.5–14.5)
ERYTHROCYTE [SEDIMENTATION RATE] IN BLOOD BY PHOTOMETRIC METHOD: 33 MM/HR (ref 0–36)
EST. GFR  (NO RACE VARIABLE): 57 ML/MIN/1.73 M^2
GLUCOSE SERPL-MCNC: 134 MG/DL (ref 70–110)
HCT VFR BLD AUTO: 42.9 % (ref 37–48.5)
HGB BLD-MCNC: 13.7 G/DL (ref 12–16)
IMM GRANULOCYTES # BLD AUTO: 0.03 K/UL (ref 0–0.04)
IMM GRANULOCYTES NFR BLD AUTO: 0.3 % (ref 0–0.5)
LYMPHOCYTES # BLD AUTO: 2.8 K/UL (ref 1–4.8)
LYMPHOCYTES NFR BLD: 27.4 % (ref 18–48)
MCH RBC QN AUTO: 28.5 PG (ref 27–31)
MCHC RBC AUTO-ENTMCNC: 31.9 G/DL (ref 32–36)
MCV RBC AUTO: 89 FL (ref 82–98)
MONOCYTES # BLD AUTO: 0.6 K/UL (ref 0.3–1)
MONOCYTES NFR BLD: 5.9 % (ref 4–15)
NEUTROPHILS # BLD AUTO: 6.3 K/UL (ref 1.8–7.7)
NEUTROPHILS NFR BLD: 62 % (ref 38–73)
NRBC BLD-RTO: 0 /100 WBC
PLATELET # BLD AUTO: 309 K/UL (ref 150–450)
PMV BLD AUTO: 9.6 FL (ref 9.2–12.9)
POTASSIUM SERPL-SCNC: 4.5 MMOL/L (ref 3.5–5.1)
PROT SERPL-MCNC: 7.6 G/DL (ref 6–8.4)
RBC # BLD AUTO: 4.8 M/UL (ref 4–5.4)
SODIUM SERPL-SCNC: 139 MMOL/L (ref 136–145)
WBC # BLD AUTO: 10.1 K/UL (ref 3.9–12.7)

## 2024-07-03 PROCEDURE — 85025 COMPLETE CBC W/AUTO DIFF WBC: CPT | Performed by: PHYSICIAN ASSISTANT

## 2024-07-03 PROCEDURE — 85652 RBC SED RATE AUTOMATED: CPT | Performed by: PHYSICIAN ASSISTANT

## 2024-07-03 PROCEDURE — 80053 COMPREHEN METABOLIC PANEL: CPT | Performed by: PHYSICIAN ASSISTANT

## 2024-07-03 PROCEDURE — 86140 C-REACTIVE PROTEIN: CPT | Performed by: PHYSICIAN ASSISTANT

## 2024-07-03 PROCEDURE — 36415 COLL VENOUS BLD VENIPUNCTURE: CPT | Mod: PO | Performed by: PHYSICIAN ASSISTANT

## 2024-07-05 ENCOUNTER — OFFICE VISIT (OUTPATIENT)
Dept: RHEUMATOLOGY | Facility: CLINIC | Age: 79
End: 2024-07-05
Payer: MEDICARE

## 2024-07-05 ENCOUNTER — TELEPHONE (OUTPATIENT)
Dept: RHEUMATOLOGY | Facility: CLINIC | Age: 79
End: 2024-07-05

## 2024-07-05 DIAGNOSIS — M35.1 MCTD (MIXED CONNECTIVE TISSUE DISEASE): Primary | ICD-10-CM

## 2024-07-05 DIAGNOSIS — E03.8 HYPOTHYROIDISM DUE TO HASHIMOTO'S THYROIDITIS: ICD-10-CM

## 2024-07-05 DIAGNOSIS — M35.1 MCTD (MIXED CONNECTIVE TISSUE DISEASE): ICD-10-CM

## 2024-07-05 DIAGNOSIS — M06.00 SERONEGATIVE RHEUMATOID ARTHRITIS: ICD-10-CM

## 2024-07-05 DIAGNOSIS — E06.3 HYPOTHYROIDISM DUE TO HASHIMOTO'S THYROIDITIS: ICD-10-CM

## 2024-07-05 DIAGNOSIS — G89.4 CHRONIC PAIN SYNDROME: ICD-10-CM

## 2024-07-05 DIAGNOSIS — R79.82 ELEVATED C-REACTIVE PROTEIN (CRP): ICD-10-CM

## 2024-07-05 DIAGNOSIS — M06.00 SERONEGATIVE RHEUMATOID ARTHRITIS: Primary | ICD-10-CM

## 2024-07-05 RX ORDER — ABATACEPT 125 MG/ML
125 INJECTION, SOLUTION SUBCUTANEOUS
Qty: 4 ML | Refills: 11 | Status: SHIPPED | OUTPATIENT
Start: 2024-07-05

## 2024-07-05 RX ORDER — PREDNISONE 2.5 MG/1
5 TABLET ORAL DAILY PRN
Qty: 60 TABLET | Refills: 1 | Status: SHIPPED | OUTPATIENT
Start: 2024-07-05

## 2024-07-05 NOTE — PROGRESS NOTES
The patient location is: home  The chief complaint leading to consultation is: RA, MCTD    Visit type: audiovisual    Face to Face time with patient: 41  54 minutes of total time spent on the encounter, which includes face to face time and non-face to face time preparing to see the patient (eg, review of tests), Obtaining and/or reviewing separately obtained history, Documenting clinical information in the electronic or other health record, Independently interpreting results (not separately reported) and communicating results to the patient/family/caregiver, or Care coordination (not separately reported).   Each patient to whom he or she provides medical services by telemedicine is:  (1) informed of the relationship between the physician and patient and the respective role of any other health care provider with respect to management of the patient; and (2) notified that he or she may decline to receive medical services by telemedicine and may withdraw from such care at any time.    Notes:     Subjective:       Patient ID: Elham Arteaga is a 78 y.o. female.    Chief Complaint: Disease Management    Mrs. Arteaga is a 78 year old female who presents to telemedicine virtual for follow up on MCTD and seronegative RA.  She is doing poorly over the last 3 months. She has increased joint pain in her hands L>R MCPs and PIPs. She is unable to make a fist due to swelling and pain. She reports low energy and hair loss as well. She feels mood is low because she can not be as active as she wishes. She has ongoing pain in her hips and knees as well. Added glucosamine MSM supplements.     She is scheduled for eval with Dr. Camarena in August. She is s/p parathryoidectomy L on 8/23 with Dr. Dixon. She has not started treatment for osteoporosis yet.      She is taking tramadol only 1/2 on rare occasion and needs refill.     Current tx:  1. Naproxen prn  2. Robaxin prn    Prior tx:  1. Plaquenil  2. mobic   3. Rinvoq  4. Xeljanz  5.  actemra        Review of Systems   Constitutional:  Positive for activity change and fatigue. Negative for appetite change, chills and fever.   Eyes:  Negative for visual disturbance.   Respiratory:  Negative for cough and shortness of breath.    Cardiovascular:  Negative for chest pain, palpitations and leg swelling.   Gastrointestinal:  Negative for abdominal pain, constipation, diarrhea, nausea and vomiting.   Musculoskeletal:  Positive for arthralgias, back pain, joint swelling and myalgias.   Allergic/Immunologic: Positive for immunocompromised state.   Neurological:  Negative for dizziness, weakness, light-headedness and headaches.   Psychiatric/Behavioral:  Negative for dysphoric mood. The patient is not nervous/anxious.          Objective:     There were no vitals filed for this visit.        Past Medical History:   Diagnosis Date    Acute esophageal obstruction     Asthma     seasonal    Celiac disease 2020    Complex tear of lateral meniscus of right knee as current injury 2017    Depression     Duodenal ulcer     without hemorrhage or perforation     Dysphagia, unspecified(787.20)     Eosinophilic esophagitis     Fibrocystic breast     Generalized osteoarthrosis, involving multiple sites     GERD (gastroesophageal reflux disease)     Hyperlipidemia     Hyperparathyroidism 2022    Hypothyroidism     Other specified cardiac dysrhythmias(427.89)     PONV (postoperative nausea and vomiting)     Rheumatoid arthritis     Screening for other and unspecified cardiovascular conditions     Sinus problem     Stricture and stenosis of esophagus     Trigger ring finger of right hand     Unspecified asthma(493.90)      Past Surgical History:   Procedure Laterality Date    214.9      ADENOIDECTOMY      ANKLE SURGERY  2009    cyst, left     SECTION, LOW TRANSVERSE      times 1    CHOLECYSTECTOMY      ESOPHAGOGASTRODUODENOSCOPY N/A 2019    Procedure: EGD  (ESOPHAGOGASTRODUODENOSCOPY);  Surgeon: Brandon Negron MD;  Location: [facility] ENDO;  Service: Endoscopy;  Laterality: N/A;    HAND SURGERY Left     trigger finger    HERNIA REPAIR  1979    left inguinal    HYSTERECTOMY  1991    OOPHORECTOMY      PARATHYROIDECTOMY Left 8/23/2023    Procedure: PARATHYROIDECTOMY;  Surgeon: Haile Dixon MD;  Location: [facility] OR;  Service: ENT;  Laterality: Left;    SHOULDER SURGERY Right 01/2022    dr ch rotator cuff    TONSILLECTOMY      UPPER GASTROINTESTINAL ENDOSCOPY      VAGINAL DELIVERY      times 2          Physical Exam   Constitutional: She is oriented to person, place, and time.   Neurological: She is oriented to person, place, and time.       Labs reviewed:    Component      Latest Ref Rng 7/3/2024   WBC      3.90 - 12.70 K/uL 10.10    RBC      4.00 - 5.40 M/uL 4.80    Hemoglobin      12.0 - 16.0 g/dL 13.7    Hematocrit      37.0 - 48.5 % 42.9    MCV      82 - 98 fL 89    MCH      27.0 - 31.0 pg 28.5    MCHC      32.0 - 36.0 g/dL 31.9 (L)    RDW      11.5 - 14.5 % 14.1    Platelet Count      150 - 450 K/uL 309    MPV      9.2 - 12.9 fL 9.6    Immature Granulocytes      0.0 - 0.5 % 0.3    Gran # (ANC)      1.8 - 7.7 K/uL 6.3    Immature Grans (Abs)      0.00 - 0.04 K/uL 0.03    Lymph #      1.0 - 4.8 K/uL 2.8    Mono #      0.3 - 1.0 K/uL 0.6    Eos #      0.0 - 0.5 K/uL 0.4    Baso #      0.00 - 0.20 K/uL 0.08    nRBC      0 /100 WBC 0    Gran %      38.0 - 73.0 % 62.0    Lymph %      18.0 - 48.0 % 27.4    Mono %      4.0 - 15.0 % 5.9    Eos %      0.0 - 8.0 % 3.6    Basophil %      0.0 - 1.9 % 0.8    Differential Method Automated    Sodium      136 - 145 mmol/L 139    Potassium      3.5 - 5.1 mmol/L 4.5    Chloride      95 - 110 mmol/L 104    CO2      23 - 29 mmol/L 25    Glucose      70 - 110 mg/dL 134 (H)    BUN      8 - 23 mg/dL 23    Creatinine      0.5 - 1.4 mg/dL 1.0    Calcium      8.7 - 10.5 mg/dL 10.3    PROTEIN TOTAL      6.0 - 8.4 g/dL 7.6     Albumin      3.5 - 5.2 g/dL 4.0    BILIRUBIN TOTAL      0.1 - 1.0 mg/dL 0.5    ALP      55 - 135 U/L 112    AST      10 - 40 U/L 22    ALT      10 - 44 U/L 22    eGFR      >60 mL/min/1.73 m^2 57.0 !    Anion Gap      8 - 16 mmol/L 10    CRP      0.0 - 8.2 mg/L 10.5 (H)    Sed Rate      0 - 36 mm/Hr 33      Assessment:       1. Seronegative rheumatoid arthritis    2. MCTD (mixed connective tissue disease)    3. Elevated C-reactive protein (CRP)    4. Hypothyroidism due to Hashimoto's thyroiditis          Plan:       Seronegative rheumatoid arthritis  -     predniSONE (DELTASONE) 2.5 MG tablet; Take 2 tablets (5 mg total) by mouth daily as needed (arthritis flare).  Dispense: 60 tablet; Refill: 1  -     abatacept (ORENCIA CLICKJECT) 125 mg/mL AtIn; Inject 125 mg into the skin every 7 days.  Dispense: 4 mL; Refill: 11    MCTD (mixed connective tissue disease)  -     predniSONE (DELTASONE) 2.5 MG tablet; Take 2 tablets (5 mg total) by mouth daily as needed (arthritis flare).  Dispense: 60 tablet; Refill: 1  -     Hepatitis B Surface Antigen; Future; Expected date: 07/05/2024  -     Hepatitis B Core Antibody, Total; Future; Expected date: 07/05/2024  -     Hepatitis C Antibody; Future; Expected date: 07/05/2024  -     Quantiferon Gold TB; Future; Expected date: 07/05/2024    Elevated C-reactive protein (CRP)    Hypothyroidism due to Hashimoto's thyroiditis  -     TSH; Future; Expected date: 07/05/2024  -     T4, Free; Future; Expected date: 07/05/2024  -     T3, Free; Future; Expected date: 07/05/2024              78 year old female with  MCTD MATEUSZ 1:320 speckled, seronegative RA, homogenous, elevated CRP  --elevated PTH  --MTHFR  --NCGS  --Osteoarthritis  --sulfa allergy  --hyperlipidemia, unable to tolerate statins, on lovaza  --osteopenia    Plan:  1. Check labs hep b, c, tb and TFTs next week  2. Nurse injections next Wednesday toradol 60, depo 80, b12  3. Cont armour thyroid 60 mg  4. Cont naproxen, robaxin prn  5.  Prednisone prn for flare  6. Start orencia SC weekly    Follow up:  4 mo Dr. De La Torre w/labs prior

## 2024-07-05 NOTE — TELEPHONE ENCOUNTER
I called [facility] to f/u on approval for mary Bishop. I was advised chart was sent to  Luis Manuel Oneill and that she would follow up with us.    If no update by next week then please can you call on Monday to check in on status of approval of [employer] financial assistance. Phone number [phone]

## 2024-07-05 NOTE — Clinical Note
F/u visit with Dr. De La Torre December if possible Nurse shots next Wednesday at 10 am toradol 60, depo 80, b12 Schedule labs the same day as nurse injections

## 2024-07-09 RX ORDER — TRAMADOL HYDROCHLORIDE 50 MG/1
50 TABLET ORAL EVERY 12 HOURS PRN
Qty: 60 TABLET | Refills: 1 | Status: SHIPPED | OUTPATIENT
Start: 2024-07-09 | End: 2024-09-07

## 2024-07-10 ENCOUNTER — LAB VISIT (OUTPATIENT)
Dept: LAB | Facility: HOSPITAL | Age: 79
End: 2024-07-10
Attending: PHYSICIAN ASSISTANT
Payer: MEDICARE

## 2024-07-10 ENCOUNTER — CLINICAL SUPPORT (OUTPATIENT)
Dept: RHEUMATOLOGY | Facility: CLINIC | Age: 79
End: 2024-07-10
Payer: MEDICARE

## 2024-07-10 VITALS — HEART RATE: 80 BPM | DIASTOLIC BLOOD PRESSURE: 79 MMHG | SYSTOLIC BLOOD PRESSURE: 150 MMHG

## 2024-07-10 VITALS — HEART RATE: 80 BPM | SYSTOLIC BLOOD PRESSURE: 150 MMHG | DIASTOLIC BLOOD PRESSURE: 79 MMHG

## 2024-07-10 DIAGNOSIS — M05.711 RHEUMATOID ARTHRITIS INVOLVING RIGHT SHOULDER WITH POSITIVE RHEUMATOID FACTOR: ICD-10-CM

## 2024-07-10 DIAGNOSIS — M81.0 AGE-RELATED OSTEOPOROSIS WITHOUT CURRENT PATHOLOGICAL FRACTURE: Chronic | ICD-10-CM

## 2024-07-10 DIAGNOSIS — G89.4 CHRONIC PAIN SYNDROME: ICD-10-CM

## 2024-07-10 DIAGNOSIS — E03.8 HYPOTHYROIDISM DUE TO HASHIMOTO'S THYROIDITIS: ICD-10-CM

## 2024-07-10 DIAGNOSIS — E06.3 HYPOTHYROIDISM DUE TO HASHIMOTO'S THYROIDITIS: ICD-10-CM

## 2024-07-10 DIAGNOSIS — M35.1 MCTD (MIXED CONNECTIVE TISSUE DISEASE): ICD-10-CM

## 2024-07-10 DIAGNOSIS — M35.1 MCTD (MIXED CONNECTIVE TISSUE DISEASE): Primary | ICD-10-CM

## 2024-07-10 LAB
HBV CORE AB SERPL QL IA: NORMAL
HBV SURFACE AG SERPL QL IA: NORMAL
HCV AB SERPL QL IA: NORMAL
T3FREE SERPL-MCNC: 5.6 PG/ML (ref 2.3–4.2)
T4 FREE SERPL-MCNC: 0.85 NG/DL (ref 0.71–1.51)
TSH SERPL DL<=0.005 MIU/L-ACNC: 1.16 UIU/ML (ref 0.4–4)

## 2024-07-10 PROCEDURE — 36415 COLL VENOUS BLD VENIPUNCTURE: CPT | Mod: PO | Performed by: PHYSICIAN ASSISTANT

## 2024-07-10 PROCEDURE — 87340 HEPATITIS B SURFACE AG IA: CPT | Performed by: PHYSICIAN ASSISTANT

## 2024-07-10 PROCEDURE — 99999 PR PBB SHADOW E&M-EST. PATIENT-LVL III: CPT | Mod: PBBFAC,,,

## 2024-07-10 PROCEDURE — 86704 HEP B CORE ANTIBODY TOTAL: CPT | Performed by: PHYSICIAN ASSISTANT

## 2024-07-10 PROCEDURE — 84443 ASSAY THYROID STIM HORMONE: CPT | Performed by: PHYSICIAN ASSISTANT

## 2024-07-10 PROCEDURE — 84439 ASSAY OF FREE THYROXINE: CPT | Performed by: PHYSICIAN ASSISTANT

## 2024-07-10 PROCEDURE — 96372 THER/PROPH/DIAG INJ SC/IM: CPT | Mod: S$GLB,,, | Performed by: PHYSICIAN ASSISTANT

## 2024-07-10 PROCEDURE — 86480 TB TEST CELL IMMUN MEASURE: CPT | Performed by: PHYSICIAN ASSISTANT

## 2024-07-10 PROCEDURE — 84481 FREE ASSAY (FT-3): CPT | Performed by: PHYSICIAN ASSISTANT

## 2024-07-10 PROCEDURE — 86803 HEPATITIS C AB TEST: CPT | Performed by: PHYSICIAN ASSISTANT

## 2024-07-10 RX ORDER — CYANOCOBALAMIN 1000 UG/ML
1000 INJECTION, SOLUTION INTRAMUSCULAR; SUBCUTANEOUS
Status: COMPLETED | OUTPATIENT
Start: 2024-07-10 | End: 2024-07-10

## 2024-07-10 RX ORDER — METHYLPREDNISOLONE ACETATE 80 MG/ML
80 INJECTION, SUSPENSION INTRA-ARTICULAR; INTRALESIONAL; INTRAMUSCULAR; SOFT TISSUE
Status: COMPLETED | OUTPATIENT
Start: 2024-07-10 | End: 2024-07-10

## 2024-07-10 RX ORDER — KETOROLAC TROMETHAMINE 30 MG/ML
60 INJECTION, SOLUTION INTRAMUSCULAR; INTRAVENOUS
Status: COMPLETED | OUTPATIENT
Start: 2024-07-10 | End: 2024-07-10

## 2024-07-10 RX ADMIN — CYANOCOBALAMIN 1000 MCG: 1000 INJECTION, SOLUTION INTRAMUSCULAR; SUBCUTANEOUS at 11:07

## 2024-07-10 RX ADMIN — KETOROLAC TROMETHAMINE 60 MG: 30 INJECTION, SOLUTION INTRAMUSCULAR; INTRAVENOUS at 11:07

## 2024-07-10 RX ADMIN — METHYLPREDNISOLONE ACETATE 80 MG: 80 INJECTION, SUSPENSION INTRA-ARTICULAR; INTRALESIONAL; INTRAMUSCULAR; SOFT TISSUE at 11:07

## 2024-07-10 NOTE — PROGRESS NOTES
2 pt ID used, allergies reviewed,see MAR for meds, doses and injection sites. Pt tolerated well. TC LPN

## 2024-07-10 NOTE — PROGRESS NOTES
2 pt ID used, allergies reviewed, See MAR for meds, doses, and injection sites. Pt tolerated well. TC LPN

## 2024-07-12 LAB
GAMMA INTERFERON BACKGROUND BLD IA-ACNC: 0 IU/ML
M TB IFN-G CD4+ BCKGRND COR BLD-ACNC: 0.01 IU/ML
M TB IFN-G CD4+ BCKGRND COR BLD-ACNC: 0.02 IU/ML
MITOGEN IGNF BCKGRD COR BLD-ACNC: 10 IU/ML
TB GOLD PLUS: NEGATIVE

## 2024-08-14 DIAGNOSIS — E06.3 HASHIMOTO'S THYROIDITIS: ICD-10-CM

## 2024-08-14 NOTE — TELEPHONE ENCOUNTER
Pharmacy requesting refill on Marshall Thyroid 60mg  Pt's LOV 07/05/2024  Pt's NOV 01/15/2025  Medication pending

## 2024-08-16 DIAGNOSIS — E06.3 HASHIMOTO'S THYROIDITIS: ICD-10-CM

## 2024-08-20 ENCOUNTER — DOCUMENTATION ONLY (OUTPATIENT)
Dept: PHARMACY | Facility: CLINIC | Age: 79
End: 2024-08-20
Payer: MEDICARE

## 2024-08-20 RX ORDER — THYROID 60 MG/1
60 TABLET ORAL
Qty: 90 TABLET | Refills: 1 | Status: SHIPPED | OUTPATIENT
Start: 2024-08-20

## 2024-08-20 NOTE — PROGRESS NOTES
PA Case: [id number]  APPROVED UNTIL 12/31/2024  ARMOUR THYROID 60MG     MARQUITA LALA CPhT  M.A.S

## 2024-08-21 DIAGNOSIS — E06.3 HASHIMOTO'S THYROIDITIS: ICD-10-CM

## 2024-08-21 RX ORDER — SULFAMETHOXAZOLE AND TRIMETHOPRIM 800; 160 MG/1; MG/1
60 TABLET ORAL
Qty: 90 TABLET | Refills: 1 | OUTPATIENT
Start: 2024-08-21

## 2024-08-21 RX ORDER — THYROID 60 MG/1
60 TABLET ORAL
Qty: 90 TABLET | Refills: 1 | Status: SHIPPED | OUTPATIENT
Start: 2024-08-21

## 2024-09-17 ENCOUNTER — TELEPHONE (OUTPATIENT)
Dept: RHEUMATOLOGY | Facility: CLINIC | Age: 79
End: 2024-09-17
Payer: MEDICARE

## 2024-09-17 NOTE — TELEPHONE ENCOUNTER
Clarified with [facility] that they meant Orencia. No notes about change of therapy since appointment in 7/5/2024. Will double check with provider after clinic. Inquired if [facility] can clarify with patient in case it is a non-rheumatology medication

## 2024-09-17 NOTE — TELEPHONE ENCOUNTER
----- Message from Parag Araya PharmD sent at 9/13/2024 12:12 PM CDT -----  Regarding: Enbrel Update  Good afternoon Elsi,    The patient has been denied for Enbrel PAP due to qualifying for LIS. When following up with her today, she told us that her provider is changing therapy to something else. However, I did not see any notes in the chart regarding this. Just wanted to confirm if she will be prescribed alternate therapy and if we can close her Enbrel referral.    Thank you,    Parag Araya, PharmD  Clinical Pharmacist   [facility]   P: [phone]

## 2024-09-17 NOTE — TELEPHONE ENCOUNTER
Don't see in patient's chart where she has been on Enbrel. Last saw provider 7/5/2024 and was to start Orencia    Reaching out to [facility] for clarification on the message that was sent

## 2024-09-17 NOTE — TELEPHONE ENCOUNTER
Discussed with Dr. De La Torre. Stated that she has not made any changes and not sure what other therapy patient is referring too. Patient hasn't been seen by office since 7/2024

## 2024-10-30 PROBLEM — H04.201 WATERING OF RIGHT EYE: Status: ACTIVE | Noted: 2024-10-30

## 2025-01-15 ENCOUNTER — OFFICE VISIT (OUTPATIENT)
Dept: RHEUMATOLOGY | Facility: CLINIC | Age: 80
End: 2025-01-15
Payer: MEDICARE

## 2025-01-15 VITALS
BODY MASS INDEX: 27.83 KG/M2 | HEART RATE: 93 BPM | HEIGHT: 62 IN | DIASTOLIC BLOOD PRESSURE: 86 MMHG | WEIGHT: 151.25 LBS | SYSTOLIC BLOOD PRESSURE: 152 MMHG

## 2025-01-15 DIAGNOSIS — G89.4 CHRONIC PAIN SYNDROME: ICD-10-CM

## 2025-01-15 DIAGNOSIS — M81.0 OSTEOPOROSIS, UNSPECIFIED OSTEOPOROSIS TYPE, UNSPECIFIED PATHOLOGICAL FRACTURE PRESENCE: ICD-10-CM

## 2025-01-15 DIAGNOSIS — D80.4 IGM DEFICIENCY: ICD-10-CM

## 2025-01-15 DIAGNOSIS — E06.3 HASHIMOTO'S THYROIDITIS: ICD-10-CM

## 2025-01-15 DIAGNOSIS — J01.01 ACUTE RECURRENT MAXILLARY SINUSITIS: ICD-10-CM

## 2025-01-15 DIAGNOSIS — F51.01 PRIMARY INSOMNIA: Primary | ICD-10-CM

## 2025-01-15 DIAGNOSIS — M35.1 MCTD (MIXED CONNECTIVE TISSUE DISEASE): ICD-10-CM

## 2025-01-15 DIAGNOSIS — K90.41 NON-CELIAC GLUTEN SENSITIVITY: ICD-10-CM

## 2025-01-15 PROCEDURE — 1125F AMNT PAIN NOTED PAIN PRSNT: CPT | Mod: CPTII,S$GLB,, | Performed by: INTERNAL MEDICINE

## 2025-01-15 PROCEDURE — 3079F DIAST BP 80-89 MM HG: CPT | Mod: CPTII,S$GLB,, | Performed by: INTERNAL MEDICINE

## 2025-01-15 PROCEDURE — 1160F RVW MEDS BY RX/DR IN RCRD: CPT | Mod: CPTII,S$GLB,, | Performed by: INTERNAL MEDICINE

## 2025-01-15 PROCEDURE — 3077F SYST BP >= 140 MM HG: CPT | Mod: CPTII,S$GLB,, | Performed by: INTERNAL MEDICINE

## 2025-01-15 PROCEDURE — 1101F PT FALLS ASSESS-DOCD LE1/YR: CPT | Mod: CPTII,S$GLB,, | Performed by: INTERNAL MEDICINE

## 2025-01-15 PROCEDURE — 99999 PR PBB SHADOW E&M-EST. PATIENT-LVL IV: CPT | Mod: PBBFAC,,, | Performed by: INTERNAL MEDICINE

## 2025-01-15 PROCEDURE — 99215 OFFICE O/P EST HI 40 MIN: CPT | Mod: S$GLB,,, | Performed by: INTERNAL MEDICINE

## 2025-01-15 PROCEDURE — 1159F MED LIST DOCD IN RCRD: CPT | Mod: CPTII,S$GLB,, | Performed by: INTERNAL MEDICINE

## 2025-01-15 PROCEDURE — 3288F FALL RISK ASSESSMENT DOCD: CPT | Mod: CPTII,S$GLB,, | Performed by: INTERNAL MEDICINE

## 2025-01-15 RX ORDER — FLUCONAZOLE 150 MG/1
150 TABLET ORAL DAILY
Qty: 7 TABLET | Refills: 0 | Status: SHIPPED | OUTPATIENT
Start: 2025-01-15 | End: 2025-01-22

## 2025-01-15 RX ORDER — TRAMADOL HYDROCHLORIDE 50 MG/1
50 TABLET ORAL EVERY 12 HOURS PRN
COMMUNITY
Start: 2024-11-27 | End: 2025-01-15 | Stop reason: SDUPTHER

## 2025-01-15 RX ORDER — NAPROXEN 375 MG/1
375 TABLET ORAL 2 TIMES DAILY WITH MEALS
COMMUNITY
Start: 2024-10-16

## 2025-01-15 RX ORDER — SUVOREXANT 20 MG/1
20 TABLET, FILM COATED ORAL NIGHTLY
Qty: 30 TABLET | Refills: 4 | Status: SHIPPED | OUTPATIENT
Start: 2025-01-15 | End: 2025-07-14

## 2025-01-15 RX ORDER — NEOMYCIN SULFATE, POLYMYXIN B SULFATE AND DEXAMETHASONE 3.5; 10000; 1 MG/ML; [USP'U]/ML; MG/ML
SUSPENSION/ DROPS OPHTHALMIC
COMMUNITY
Start: 2024-11-20

## 2025-01-15 RX ORDER — AZITHROMYCIN 250 MG/1
TABLET, FILM COATED ORAL
Qty: 6 TABLET | Refills: 0 | Status: SHIPPED | OUTPATIENT
Start: 2025-01-15

## 2025-01-15 RX ORDER — TRAMADOL HYDROCHLORIDE 50 MG/1
50 TABLET ORAL EVERY 12 HOURS PRN
Qty: 60 TABLET | Refills: 5 | Status: SHIPPED | OUTPATIENT
Start: 2025-01-15 | End: 2025-07-14

## 2025-01-15 NOTE — PROGRESS NOTES
Subjective:     Patient ID:  Elham Arteaga    Chief Complaint:  Disease Management     History of Present Illness:  Pt is a 79 y.o. female  MCTD and seronegative RA.  She is doing poorly over the last 3 months. She has increased joint pain in her hands L>R MCPs and PIPs. She is unable to make a fist due to swelling and pain. She reports low energy and hair loss as well. She feels mood is low because she can not be as active as she wishes. She has ongoing pain in her hips and knees as well. Added glucosamine MSM supplements.      She is scheduled for eval with Dr. Camarena in August. She is s/p parathryoidectomy L on 8/23 with Dr. Dixon. She has not started treatment for osteoporosis yet.       She is taking tramadol only 1/2 on rare occasion and needs refill.      Current tx:  1. Naproxen prn  2. Robaxin prn     Prior tx:  1. Plaquenil  2. mobic   3. Rinvoq  4. Xeljanz  5. actemra               Rheumatologic History:   - Diagnosis/es:  - Positive serologies:  - Infectious screening labs:  - Previous Treatments:  - Current Treatments:     Interval History:   Hospitalization since last office visit: No    Patient Active Problem List    Diagnosis Date Noted    Watering of right eye 10/30/2024    Statin intolerance 10/27/2023    History of parathyroid surgery 10/18/2023    RBBB 10/18/2023    Benign neoplasm of parathyroid gland 08/23/2023    Pure hypercholesterolemia 04/17/2023    Osteoporosis 04/17/2023    Chest pain 08/27/2022    Fibrocystic breast     Rheumatoid arthritis involving multiple sites with positive rheumatoid factor     Hyperparathyroidism 02/24/2022    Traumatic hematoma of buttock - right 10/28/2021    MCTD (mixed connective tissue disease) 04/05/2021    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    Paroxysmal supraventricular tachycardia 04/05/2021    Leukocytosis 03/12/2020    Hashimoto's disease 03/10/2020    MTHFR gene mutation 03/10/2020    Nodular thyroid disease 02/07/2020    Vitamin D  deficiency 2019    Hypercalcemia 2019    Allergy to statin medication 2019    Reactive depression 2019    Atherosclerosis of abdominal aorta 2019    Osteopenia of multiple sites 2018    Mild intermittent asthma without complication 2018    Menopause 2018    Benign essential HTN 2018    Situational anxiety 2018    Hyperlipidemia 2018    Eosinophilic esophagitis 2018    GERD (gastroesophageal reflux disease) 2018    Hypothyroidism due to Hashimoto's thyroiditis 10/10/2016    Seasonal allergic rhinitis due to pollen 02/10/2016    Osteoarthritis of left hip 2015     Past Surgical History:   Procedure Laterality Date    214.9      ADENOIDECTOMY      ANKLE SURGERY  2009    cyst, left     SECTION, LOW TRANSVERSE      times 1    CHOLECYSTECTOMY      ESOPHAGOGASTRODUODENOSCOPY N/A 2019    Procedure: EGD (ESOPHAGOGASTRODUODENOSCOPY);  Surgeon: Brandon Negron MD;  Location: [facility] ENDO;  Service: Endoscopy;  Laterality: N/A;    HAND SURGERY Left     trigger finger    HERNIA REPAIR      left inguinal    HYSTERECTOMY      OOPHORECTOMY      PARATHYROIDECTOMY Left 2023    Procedure: PARATHYROIDECTOMY;  Surgeon: Haile Dixon MD;  Location: [facility] OR;  Service: ENT;  Laterality: Left;    SHOULDER SURGERY Right 2022    dr ch rotator cuff    TONSILLECTOMY      UPPER GASTROINTESTINAL ENDOSCOPY      VAGINAL DELIVERY      times 2     Social History     Tobacco Use    Smoking status: Never    Smokeless tobacco: Never   Substance Use Topics    Alcohol use: Yes     Comment: rarely    Drug use: Never     Family History   Problem Relation Name Age of Onset    Cancer Mother      Alcohol abuse Father       Review of patient's allergies indicates:   Allergen Reactions    Codeine Anaphylaxis and Nausea And Vomiting    Aciphex [rabeprazole] Other (See Comments)     Stomach pain    Ceftin  "[cefuroxime axetil] Nausea Only    Crestor [rosuvastatin] Other (See Comments)     Myalgia      Lisinopril Other (See Comments)     weakness    Promethazine Other (See Comments)     Unsure of reaction    Protonix [pantoprazole] Other (See Comments)     Abdominal pain    Eggs [egg derived] Other (See Comments)     "scarring esophagus"    Gluten protein     Kevzara [sarilumab] Other (See Comments)     Hair fell out and GI upset    Mobic [meloxicam]      Watery eyes, vaginal itching    Morphine Other (See Comments)     Hallucinations, angry, violent    Penicillins Hives    Plaquenil [hydroxychloroquine] Hives     Broke out in hives and whelps all over.    Soy     Sulfa (sulfonamide antibiotics) Other (See Comments)     Told no Sulfa drugs as a child    Wheat containing prod     Xeljanz [tofacitinib]      Dyspnea, change in thought processes.     Amlodipine Other (See Comments)     Sensitive, weakness    Elavil [amitriptyline] Other (See Comments)     nightmares    Lactose Nausea Only and Other (See Comments)    Prozac [fluoxetine] Other (See Comments)     Didn't work    Wellbutrin [bupropion hcl] Other (See Comments)     Didn't work       Review of Systems   Review of Systems   Constitutional:  Positive for chills and fatigue. Negative for activity change, appetite change, diaphoresis, fever and unexpected weight change.   HENT:  Negative for congestion, dental problem, ear discharge, ear pain, facial swelling, mouth sores, nosebleeds, postnasal drip, rhinorrhea, sinus pressure, sneezing, sore throat, tinnitus, trouble swallowing and voice change.    Eyes:  Negative for photophobia, pain, discharge, redness and itching.   Respiratory:  Positive for cough. Negative for apnea, chest tightness, shortness of breath and wheezing.    Cardiovascular:  Positive for leg swelling. Negative for chest pain and palpitations.   Gastrointestinal:  Positive for abdominal pain. Negative for abdominal distention, " constipation, diarrhea, nausea and vomiting.   Endocrine: Negative for cold intolerance, heat intolerance, polydipsia and polyuria.   Genitourinary:  Negative for decreased urine volume, difficulty urinating, dysuria, flank pain, frequency, hematuria and urgency.   Musculoskeletal:  Negative for arthralgias, back pain, gait problem, joint swelling, myalgias, neck pain and neck stiffness.   Skin:  Negative for pallor, rash and wound.   Allergic/Immunologic: Negative for immunocompromised state.   Neurological:  Negative for dizziness, tremors, numbness and headaches.   Hematological:  Negative for adenopathy. Does not bruise/bleed easily.   Psychiatric/Behavioral:  Negative for sleep disturbance. The patient is not nervous/anxious.       Current Medications:  Current Outpatient Medications   Medication Instructions    acetaminophen (TYLENOL) 650 mg, 2 times daily PRN    albuterol (PROVENTIL/VENTOLIN HFA) 90 mcg/actuation inhaler 2 puffs, Inhalation, Every 6 hours PRN    ALPRAZolam (XANAX) 0.5 MG tablet TAKE 1 TABLET THREE TIMES DAILY AS NEEDED FOR INSOMNIA OR ANXIETY    amino acids (AMINO ACID ORAL) 200 mg, Every morning    azithromycin (Z-LAUREN) 250 MG tablet Take 2 tablets by mouth on day 1; Take 1 tablet by mouth on days 2-5    b complex vitamins capsule 1 capsule, Every morning    BELSOMRA 20 mg, Oral, Nightly    cloNIDine (CATAPRES) 0.1 mg, Oral, 3 times daily PRN    EPINEPHrine (EPIPEN) 0.3 mg, Intramuscular, Once    ergocalciferol (ERGOCALCIFEROL) 50,000 Units, Oral, Twice weekly    fluconazole (DIFLUCAN) 150 mg, Oral, Daily    fluticasone propionate (FLONASE) 50 mcg/actuation nasal spray USE 1 SPRAY (50 MCG TOTAL) IN EACH NOSTRIL ONCE DAILY    ketoconazole (NIZORAL) 2 % shampoo Topical (Top), Twice weekly    Lactobacillus acidophilus (PROBIOTIC ORAL) 1 Dose, Every morning    MAG CITRATE-POTASSIUM CITRATE ORAL Daily    methocarbamoL (ROBAXIN) 750 mg, 2 times daily PRN    multivit with  "calcium,iron,min (MULTIPLE VITAMIN, WOMENS ORAL) Daily    naproxen (NAPROSYN) 375 mg, 2 times daily with meals    neomycin-polymyxin-dexamethasone (MAXITROL) 3.5mg/mL-10,000 unit/mL-0.1 % DrpS APPLY 1 DROP INTO RIGHT EYE FOUR TIMES A DAY 4 TIMES A DAY FOR 2 WEEKS THEN TWICE A DAY FOR 2 WEEKS    PAPAYA ENZYME ORAL 3 tablets, Daily PRN    pseudoephedrine HCl (SUDAFED ORAL) 2 tablets, Daily PRN    thyroid (pork) (ARMOUR THYROID) 60 mg, Oral, Before breakfast    traMADoL (ULTRAM) 50 mg, Oral, Every 12 hours PRN    valACYclovir (VALTREX) 1,000 mg, Oral, 3 times daily    vit A and D3 in cod liver oiL (COD LIVER OIL) 1,250-135 unit Cap 2 capsules, Daily    WIXELA INHUB 250-50 mcg/dose diskus inhaler 1 puff, Inhalation, 2 times daily, Controller         Objective:     Vitals:    01/15/25 1652   BP: (!) 152/86   Pulse: 93   Weight: 68.6 kg (151 lb 3.8 oz)   Height: 5' 2" (1.575 m)   PainSc: 10-Worst pain ever   PainLoc: Knee      Body mass index is 27.66 kg/m².     Physical Examinations:  Physical Exam   Constitutional: She is oriented to person, place, and time.   HENT:   Head: Normocephalic and atraumatic.   Mouth/Throat: Oropharynx is clear and moist.   Eyes: Pupils are equal, round, and reactive to light.   Neck: No thyromegaly present.   Cardiovascular: Normal rate, regular rhythm and normal heart sounds. Exam reveals no gallop and no friction rub.   No murmur heard.  Pulmonary/Chest: Effort normal and breath sounds normal. She has no wheezes. She has no rales. She exhibits no tenderness.   Abdominal: There is no abdominal tenderness. There is no rebound and no guarding.   Musculoskeletal:         General: Deformity present. No tenderness.      Right shoulder: Normal.      Left shoulder: Normal.      Right elbow: Normal.      Left elbow: Normal.      Right wrist: Normal.      Left wrist: Normal.      Cervical back: Normal range of motion and neck supple.      Right knee: Normal. No effusion.      Left knee: " Normal. No effusion.      Comments: Mild oa   Lymphadenopathy:     She has no cervical adenopathy.   Neurological: She is alert and oriented to person, place, and time. Gait normal.   Skin: No rash noted. No erythema. No pallor.   Psychiatric: Mood and affect normal.   Vitals reviewed.      Right Side Rheumatological Exam     Examination finds the shoulder, elbow, wrist, knee, 1st PIP, 1st MCP, 2nd PIP, 2nd MCP, 3rd PIP, 3rd MCP, 4th PIP, 4th MCP, 5th PIP and 5th MCP normal.    Shoulder Exam   Tenderness Location: no tenderness    Range of Motion   Active abduction:  abnormal   Adduction: abnormal  Sensation: normal    Knee Exam   Patellofemoral Crepitus: negative  Effusion: negative  Sensation: normal    Hip Exam   Tenderness Location: no tenderness  Sensation: normal    Elbow/Wrist Exam   Tenderness Location: no tenderness  Sensation: normal    Left Side Rheumatological Exam     Examination finds the shoulder, elbow, wrist, knee, 1st PIP, 1st MCP, 2nd PIP, 2nd MCP, 3rd PIP, 3rd MCP, 4th PIP, 4th MCP, 5th PIP and 5th MCP normal.    Shoulder Exam   Tenderness Location: no tenderness    Range of Motion   Active abduction:  abnormal   Sensation: normal    Knee Exam     Patellofemoral Crepitus: negative  Effusion: negative  Sensation: normal    Hip Exam   Tenderness Location: no tenderness  Sensation: normal    Elbow/Wrist Exam   Sensation: normal      Back/Neck Exam   General Inspection   Gait: normal          Disease Assessment Scores:  Patient's Global Assessment of arthritis (0-10): 2  Physician's Global Assessment of arthritis (0-10): 3  Number of Tender Joints (0-28): 4  Number of Swollen Joints (0-28): 4         No data to display                Monitoring Lab Results:  Lab Results   Component Value Date    WBC 8.55 11/11/2024    RBC 4.91 11/11/2024    HGB 13.6 11/11/2024    HCT 43.7 11/11/2024    MCV 89 11/11/2024    MCH 27.7 11/11/2024    MCHC 31.1 (L) 11/11/2024    RDW 14.2 11/11/2024     11/11/2024     "    Lab Results   Component Value Date     11/11/2024    K 4.3 11/11/2024     11/11/2024    CO2 24 11/11/2024     (H) 11/11/2024    BUN 27 (H) 11/11/2024    CREATININE 0.93 11/11/2024    CALCIUM 10.0 11/11/2024    PROT 7.2 11/11/2024    ALBUMIN 4.4 11/11/2024    BILITOT 0.9 11/11/2024    ALKPHOS 121 11/11/2024    AST 31 11/11/2024    ALT 25 11/11/2024    ANIONGAP 7 11/11/2024    EGFRNORACEVR >60 11/11/2024       Lab Results   Component Value Date    SEDRATE 33 07/03/2024    CRP 10.5 (H) 07/03/2024        Lab Results   Component Value Date    [id number] 62 08/05/2024        Lab Results   Component Value Date    CHOL 247 (H) 11/11/2024    HDL 57 11/11/2024    LDLCALC 161.4 (H) 11/11/2024    TRIG 143 11/11/2024       Lab Results   Component Value Date    RF <13.0 01/26/2024    CCPANTIBODIE 0.5 01/26/2024     Lab Results   Component Value Date    ANASCREEN Positive (A) 05/03/2023    ANATITER 1:160 05/03/2023    DSDNA Negative 1:10 05/03/2023    SMRNPAB 3 06/15/2020    SSAANTIBODY 4 06/15/2020    SSBANTIBODY 0 06/15/2020    PPS93QZ 2 06/15/2020     No results found for: "HLABB27"    Infectious Disease Screening:  Lab Results   Component Value Date    HEPBSAG Non-reactive 07/10/2024    HEPBCAB Non-reactive 07/10/2024    HEPBSAB Negative 04/05/2021     Lab Results   Component Value Date    HEPCAB Non-reactive 07/10/2024     Lab Results   Component Value Date    TBGOLDPLUS Negative 07/10/2024     No results found for: "QUANTIFERON", "SVCMT", "QUANTAGVALUE", "QUANTNILVALU", "QUANTMITOGEN", "QFTTBAG", "QINT"     Imaging: DEXA, Xrays, MRIs, CTs, etc    Old & Outside Medical Records:  Reviewed old and all outside medical records available in Care Everywhere     Assessment:     Encounter Diagnoses   Name Primary?    Primary insomnia Yes    Acute recurrent maxillary sinusitis     MCTD (mixed connective tissue disease)     Hashimoto's thyroiditis     Non-celiac gluten sensitivity     IgM deficiency     " Chronic pain syndrome         Plan:      Encounter Diagnoses   Name Primary?    Primary insomnia Yes    Acute recurrent maxillary sinusitis     MCTD (mixed connective tissue disease)     Hashimoto's thyroiditis     Non-celiac gluten sensitivity     IgM deficiency     Chronic pain syndrome      Primary insomnia  -     suvorexant (BELSOMRA) 20 mg Tab; Take 20 mg by mouth every evening.  Dispense: 30 tablet; Refill: 4    Acute recurrent maxillary sinusitis  -     azithromycin (Z-LAUREN) 250 MG tablet; Take 2 tablets by mouth on day 1; Take 1 tablet by mouth on days 2-5  Dispense: 6 tablet; Refill: 0  -     fluconazole (DIFLUCAN) 150 MG Tab; Take 1 tablet (150 mg total) by mouth once daily. for 7 days  Dispense: 7 tablet; Refill: 0    MCTD (mixed connective tissue disease)  -     suvorexant (BELSOMRA) 20 mg Tab; Take 20 mg by mouth every evening.  Dispense: 30 tablet; Refill: 4  -     Complement, Total; Future; Expected date: 01/15/2025  -     C4 Complement; Future; Expected date: 01/15/2025  -     C3 Complement; Future; Expected date: 01/15/2025  -     Sedimentation rate; Future; Expected date: 01/15/2025  -     Sjogrens syndrome-B extractable nuclear antibody; Future; Expected date: 01/15/2025  -     Sjogrens syndrome-A extractable nuclear antibody; Future; Expected date: 01/15/2025  -     Comprehensive Metabolic Panel; Future; Expected date: 01/15/2025  -     CBC Auto Differential; Future; Expected date: 01/15/2025  -     Anti-Smith Antibody; Future; Expected date: 01/15/2025  -     Anti-Scleroderma Antibody; Future; Expected date: 01/15/2025  -     Anti-Histone Antibody; Future; Expected date: 01/15/2025  -     Anti-DNA Ab, Double-Stranded; Future; Expected date: 01/15/2025  -     Anti Sm/RNP Antibody; Future; Expected date: 01/15/2025  -     MATEUSZ Screen w/Reflex; Future; Expected date: 01/15/2025    Hashimoto's thyroiditis  -     suvorexant (BELSOMRA) 20 mg Tab; Take 20 mg by mouth every evening.  Dispense: 30  tablet; Refill: 4  -     Complement, Total; Future; Expected date: 01/15/2025  -     C4 Complement; Future; Expected date: 01/15/2025  -     C3 Complement; Future; Expected date: 01/15/2025  -     Sedimentation rate; Future; Expected date: 01/15/2025  -     Sjogrens syndrome-B extractable nuclear antibody; Future; Expected date: 01/15/2025  -     Sjogrens syndrome-A extractable nuclear antibody; Future; Expected date: 01/15/2025  -     Comprehensive Metabolic Panel; Future; Expected date: 01/15/2025  -     CBC Auto Differential; Future; Expected date: 01/15/2025  -     Anti-Smith Antibody; Future; Expected date: 01/15/2025  -     Anti-Scleroderma Antibody; Future; Expected date: 01/15/2025  -     Anti-Histone Antibody; Future; Expected date: 01/15/2025  -     Anti-DNA Ab, Double-Stranded; Future; Expected date: 01/15/2025  -     Anti Sm/RNP Antibody; Future; Expected date: 01/15/2025  -     MATEUSZ Screen w/Reflex; Future; Expected date: 01/15/2025    Non-celiac gluten sensitivity  -     suvorexant (BELSOMRA) 20 mg Tab; Take 20 mg by mouth every evening.  Dispense: 30 tablet; Refill: 4  -     Complement, Total; Future; Expected date: 01/15/2025  -     C4 Complement; Future; Expected date: 01/15/2025  -     C3 Complement; Future; Expected date: 01/15/2025  -     Sedimentation rate; Future; Expected date: 01/15/2025  -     Sjogrens syndrome-B extractable nuclear antibody; Future; Expected date: 01/15/2025  -     Sjogrens syndrome-A extractable nuclear antibody; Future; Expected date: 01/15/2025  -     Comprehensive Metabolic Panel; Future; Expected date: 01/15/2025  -     CBC Auto Differential; Future; Expected date: 01/15/2025  -     Anti-Smith Antibody; Future; Expected date: 01/15/2025  -     Anti-Scleroderma Antibody; Future; Expected date: 01/15/2025  -     Anti-Histone Antibody; Future; Expected date: 01/15/2025  -     Anti-DNA Ab, Double-Stranded; Future; Expected date: 01/15/2025  -     Anti Sm/RNP Antibody;  Future; Expected date: 01/15/2025  -     CONNIE Screen w/Reflex; Future; Expected date: 01/15/2025    IgM deficiency  -     suvorexant (BELSOMRA) 20 mg Tab; Take 20 mg by mouth every evening.  Dispense: 30 tablet; Refill: 4  -     Complement, Total; Future; Expected date: 01/15/2025  -     C4 Complement; Future; Expected date: 01/15/2025  -     C3 Complement; Future; Expected date: 01/15/2025  -     Sedimentation rate; Future; Expected date: 01/15/2025  -     Sjogrens syndrome-B extractable nuclear antibody; Future; Expected date: 01/15/2025  -     Sjogrens syndrome-A extractable nuclear antibody; Future; Expected date: 01/15/2025  -     Comprehensive Metabolic Panel; Future; Expected date: 01/15/2025  -     CBC Auto Differential; Future; Expected date: 01/15/2025  -     Anti-Smith Antibody; Future; Expected date: 01/15/2025  -     Anti-Scleroderma Antibody; Future; Expected date: 01/15/2025  -     Anti-Histone Antibody; Future; Expected date: 01/15/2025  -     Anti-DNA Ab, Double-Stranded; Future; Expected date: 01/15/2025  -     Anti Sm/RNP Antibody; Future; Expected date: 01/15/2025  -     CONNIE Screen w/Reflex; Future; Expected date: 01/15/2025    Chronic pain syndrome  -     traMADoL (ULTRAM) 50 mg tablet; Take 1 tablet (50 mg total) by mouth every 12 (twelve) hours as needed for Pain.  Dispense: 60 tablet; Refill: 5          1. Check connie  2. Labs ordered  3.  Prolia        Follow-up 6 months    More than 50% of the  40 minute encounter was spent face to face counseling the patient regarding current status and future plan of care as well as side effects  of the medications. All questions were answered to patient's satisfaction also includes  non-face to face time preparing to see the patient (eg, review of tests), Obtaining and/or reviewing separately obtained history, Documenting clinical information in the electronic or other health record, Independently interpreting results

## 2025-01-28 ENCOUNTER — LAB VISIT (OUTPATIENT)
Dept: LAB | Facility: HOSPITAL | Age: 80
End: 2025-01-28
Attending: INTERNAL MEDICINE
Payer: MEDICARE

## 2025-01-28 DIAGNOSIS — D80.4 IGM DEFICIENCY: ICD-10-CM

## 2025-01-28 DIAGNOSIS — K90.41 NON-CELIAC GLUTEN SENSITIVITY: ICD-10-CM

## 2025-01-28 DIAGNOSIS — M35.1 MCTD (MIXED CONNECTIVE TISSUE DISEASE): ICD-10-CM

## 2025-01-28 DIAGNOSIS — E06.3 HASHIMOTO'S THYROIDITIS: ICD-10-CM

## 2025-01-28 LAB
ALBUMIN SERPL BCP-MCNC: 3.9 G/DL (ref 3.5–5.2)
ALP SERPL-CCNC: 109 U/L (ref 40–150)
ALT SERPL W/O P-5'-P-CCNC: 24 U/L (ref 10–44)
ANION GAP SERPL CALC-SCNC: 9 MMOL/L (ref 8–16)
AST SERPL-CCNC: 23 U/L (ref 10–40)
BASOPHILS # BLD AUTO: 0.05 K/UL (ref 0–0.2)
BASOPHILS NFR BLD: 0.7 % (ref 0–1.9)
BILIRUB SERPL-MCNC: 0.6 MG/DL (ref 0.1–1)
BUN SERPL-MCNC: 18 MG/DL (ref 8–23)
C3 SERPL-MCNC: 166 MG/DL (ref 50–180)
C4 SERPL-MCNC: 21 MG/DL (ref 11–44)
CALCIUM SERPL-MCNC: 9.3 MG/DL (ref 8.7–10.5)
CHLORIDE SERPL-SCNC: 104 MMOL/L (ref 95–110)
CO2 SERPL-SCNC: 26 MMOL/L (ref 23–29)
CREAT SERPL-MCNC: 0.8 MG/DL (ref 0.5–1.4)
DIFFERENTIAL METHOD BLD: ABNORMAL
EOSINOPHIL # BLD AUTO: 0.3 K/UL (ref 0–0.5)
EOSINOPHIL NFR BLD: 3.7 % (ref 0–8)
ERYTHROCYTE [DISTWIDTH] IN BLOOD BY AUTOMATED COUNT: 14.3 % (ref 11.5–14.5)
ERYTHROCYTE [SEDIMENTATION RATE] IN BLOOD BY PHOTOMETRIC METHOD: 21 MM/HR (ref 0–36)
EST. GFR  (NO RACE VARIABLE): >60 ML/MIN/1.73 M^2
GLUCOSE SERPL-MCNC: 116 MG/DL (ref 70–110)
HCT VFR BLD AUTO: 43.5 % (ref 37–48.5)
HGB BLD-MCNC: 13.8 G/DL (ref 12–16)
IMM GRANULOCYTES # BLD AUTO: 0.01 K/UL (ref 0–0.04)
IMM GRANULOCYTES NFR BLD AUTO: 0.1 % (ref 0–0.5)
LYMPHOCYTES # BLD AUTO: 1.8 K/UL (ref 1–4.8)
LYMPHOCYTES NFR BLD: 25 % (ref 18–48)
MCH RBC QN AUTO: 27.9 PG (ref 27–31)
MCHC RBC AUTO-ENTMCNC: 31.7 G/DL (ref 32–36)
MCV RBC AUTO: 88 FL (ref 82–98)
MONOCYTES # BLD AUTO: 0.4 K/UL (ref 0.3–1)
MONOCYTES NFR BLD: 5.1 % (ref 4–15)
NEUTROPHILS # BLD AUTO: 4.6 K/UL (ref 1.8–7.7)
NEUTROPHILS NFR BLD: 65.4 % (ref 38–73)
NRBC BLD-RTO: 0 /100 WBC
PLATELET # BLD AUTO: 278 K/UL (ref 150–450)
PMV BLD AUTO: 9.4 FL (ref 9.2–12.9)
POTASSIUM SERPL-SCNC: 4.3 MMOL/L (ref 3.5–5.1)
PROT SERPL-MCNC: 7.6 G/DL (ref 6–8.4)
RBC # BLD AUTO: 4.95 M/UL (ref 4–5.4)
SODIUM SERPL-SCNC: 139 MMOL/L (ref 136–145)
WBC # BLD AUTO: 7 K/UL (ref 3.9–12.7)

## 2025-01-28 PROCEDURE — 86160 COMPLEMENT ANTIGEN: CPT | Mod: 59 | Performed by: INTERNAL MEDICINE

## 2025-01-28 PROCEDURE — 86235 NUCLEAR ANTIGEN ANTIBODY: CPT | Mod: 59 | Performed by: INTERNAL MEDICINE

## 2025-01-28 PROCEDURE — 85652 RBC SED RATE AUTOMATED: CPT | Performed by: INTERNAL MEDICINE

## 2025-01-28 PROCEDURE — 86038 ANTINUCLEAR ANTIBODIES: CPT | Performed by: INTERNAL MEDICINE

## 2025-01-28 PROCEDURE — 86235 NUCLEAR ANTIGEN ANTIBODY: CPT | Performed by: INTERNAL MEDICINE

## 2025-01-28 PROCEDURE — 86225 DNA ANTIBODY NATIVE: CPT | Performed by: INTERNAL MEDICINE

## 2025-01-28 PROCEDURE — 36415 COLL VENOUS BLD VENIPUNCTURE: CPT | Mod: PO | Performed by: INTERNAL MEDICINE

## 2025-01-28 PROCEDURE — 83516 IMMUNOASSAY NONANTIBODY: CPT | Performed by: INTERNAL MEDICINE

## 2025-01-28 PROCEDURE — 86160 COMPLEMENT ANTIGEN: CPT | Performed by: INTERNAL MEDICINE

## 2025-01-28 PROCEDURE — 80053 COMPREHEN METABOLIC PANEL: CPT | Performed by: INTERNAL MEDICINE

## 2025-01-28 PROCEDURE — 85025 COMPLETE CBC W/AUTO DIFF WBC: CPT | Performed by: INTERNAL MEDICINE

## 2025-01-28 PROCEDURE — 86162 COMPLEMENT TOTAL (CH50): CPT | Performed by: INTERNAL MEDICINE

## 2025-01-28 PROCEDURE — 86039 ANTINUCLEAR ANTIBODIES (ANA): CPT | Performed by: INTERNAL MEDICINE

## 2025-01-29 LAB
ANA PATTERN 1: NORMAL
ANA SER QL IF: POSITIVE
ANA TITR SER IF: NORMAL {TITER}
ANTI SM ANTIBODY: 0.16 RATIO (ref 0–0.99)
ANTI SM/RNP ANTIBODY: 0.31 RATIO (ref 0–0.99)
ANTI-SM INTERPRETATION: NEGATIVE
ANTI-SM/RNP INTERPRETATION: NEGATIVE
ANTI-SSA ANTIBODY: 0.1 RATIO (ref 0–0.99)
ANTI-SSA INTERPRETATION: NEGATIVE
ANTI-SSB ANTIBODY: 0.15 RATIO (ref 0–0.99)
ANTI-SSB INTERPRETATION: NEGATIVE
DSDNA AB SER-ACNC: NORMAL [IU]/ML

## 2025-01-31 LAB
CH50 SERPL-ACNC: 84 U/ML (ref 42–95)
ENA SCL70 AB SER-ACNC: <0.6 U/ML

## 2025-02-01 LAB — HISTONE IGG SER IA-ACNC: 0.8 UNITS (ref 0–0.9)

## 2025-02-03 ENCOUNTER — LAB VISIT (OUTPATIENT)
Dept: LAB | Facility: HOSPITAL | Age: 80
End: 2025-02-03
Attending: INTERNAL MEDICINE
Payer: MEDICARE

## 2025-02-03 DIAGNOSIS — E03.9 MYXEDEMA HEART DISEASE: ICD-10-CM

## 2025-02-03 DIAGNOSIS — E55.9 VITAMIN D DEFICIENCY DISEASE: ICD-10-CM

## 2025-02-03 DIAGNOSIS — B99.8 IMMUNOSUPPRESSION-RELATED INFECTIOUS DISEASE: ICD-10-CM

## 2025-02-03 DIAGNOSIS — K20.0 EOSINOPHILIC ESOPHAGITIS: ICD-10-CM

## 2025-02-03 DIAGNOSIS — M81.0 SENILE OSTEOPOROSIS: ICD-10-CM

## 2025-02-03 DIAGNOSIS — D84.9 IMMUNOSUPPRESSION-RELATED INFECTIOUS DISEASE: ICD-10-CM

## 2025-02-03 DIAGNOSIS — E04.2 NONTOXIC MULTINODULAR GOITER: Primary | ICD-10-CM

## 2025-02-03 DIAGNOSIS — R53.82 CHRONIC FATIGUE: ICD-10-CM

## 2025-02-03 DIAGNOSIS — I51.9 MYXEDEMA HEART DISEASE: ICD-10-CM

## 2025-02-03 LAB
IGA SERPL-MCNC: 349 MG/DL (ref 40–350)
IGG SERPL-MCNC: 920 MG/DL (ref 650–1600)
IGM SERPL-MCNC: 47 MG/DL (ref 50–300)
T3FREE SERPL-MCNC: 3.1 PG/ML (ref 2.3–4.2)
T4 FREE SERPL-MCNC: 0.68 NG/DL (ref 0.71–1.51)
TSH SERPL DL<=0.005 MIU/L-ACNC: 2.1 UIU/ML (ref 0.4–4)

## 2025-02-03 PROCEDURE — 84481 FREE ASSAY (FT-3): CPT | Performed by: INTERNAL MEDICINE

## 2025-02-03 PROCEDURE — 84443 ASSAY THYROID STIM HORMONE: CPT | Performed by: INTERNAL MEDICINE

## 2025-02-03 PROCEDURE — 84439 ASSAY OF FREE THYROXINE: CPT | Performed by: INTERNAL MEDICINE

## 2025-02-03 PROCEDURE — 86376 MICROSOMAL ANTIBODY EACH: CPT | Performed by: INTERNAL MEDICINE

## 2025-02-03 PROCEDURE — 36415 COLL VENOUS BLD VENIPUNCTURE: CPT | Mod: PO | Performed by: INTERNAL MEDICINE

## 2025-02-03 PROCEDURE — 82784 ASSAY IGA/IGD/IGG/IGM EACH: CPT | Mod: 59 | Performed by: INTERNAL MEDICINE

## 2025-02-04 LAB — THYROPEROXIDASE IGG SERPL-ACNC: <6 IU/ML

## 2025-02-13 PROBLEM — R53.82 CHRONIC FATIGUE: Status: ACTIVE | Noted: 2025-02-13

## 2025-05-03 PROBLEM — E21.3 HYPERPARATHYROIDISM: Chronic | Status: ACTIVE | Noted: 2022-02-24

## 2025-05-07 PROBLEM — M17.11 PRIMARY OSTEOARTHRITIS OF RIGHT KNEE: Chronic | Status: ACTIVE | Noted: 2025-05-07

## 2025-06-19 ENCOUNTER — OFFICE VISIT (OUTPATIENT)
Dept: URGENT CARE | Facility: CLINIC | Age: 80
End: 2025-06-19
Payer: MEDICARE

## 2025-06-19 VITALS
HEIGHT: 62 IN | OXYGEN SATURATION: 97 % | TEMPERATURE: 98 F | WEIGHT: 150 LBS | BODY MASS INDEX: 27.6 KG/M2 | DIASTOLIC BLOOD PRESSURE: 76 MMHG | HEART RATE: 102 BPM | RESPIRATION RATE: 18 BRPM | SYSTOLIC BLOOD PRESSURE: 136 MMHG

## 2025-06-19 DIAGNOSIS — J18.9 PNEUMONIA OF LEFT LOWER LOBE DUE TO INFECTIOUS ORGANISM: Primary | ICD-10-CM

## 2025-06-19 DIAGNOSIS — R05.9 COUGH, UNSPECIFIED TYPE: ICD-10-CM

## 2025-06-19 DIAGNOSIS — R09.89 RESPIRATORY CRACKLES AT LEFT LUNG BASE: ICD-10-CM

## 2025-06-19 LAB
CTP QC/QA: YES
SARS-COV+SARS-COV-2 AG RESP QL IA.RAPID: NEGATIVE

## 2025-06-19 PROCEDURE — 71046 X-RAY EXAM CHEST 2 VIEWS: CPT | Mod: S$GLB,,, | Performed by: RADIOLOGY

## 2025-06-19 RX ORDER — CEFDINIR 300 MG/1
300 CAPSULE ORAL 2 TIMES DAILY
Qty: 14 CAPSULE | Refills: 0 | Status: SHIPPED | OUTPATIENT
Start: 2025-06-19 | End: 2025-06-26

## 2025-06-19 RX ORDER — AZITHROMYCIN 250 MG/1
TABLET, FILM COATED ORAL
Qty: 6 TABLET | Refills: 0 | Status: SHIPPED | OUTPATIENT
Start: 2025-06-19

## 2025-06-19 RX ORDER — ONDANSETRON 4 MG/1
4 TABLET, ORALLY DISINTEGRATING ORAL EVERY 8 HOURS PRN
Qty: 15 TABLET | Refills: 0 | Status: SHIPPED | OUTPATIENT
Start: 2025-06-19

## 2025-06-19 NOTE — PATIENT INSTRUCTIONS
Take antibiotic with food. While on antibiotics, take a daily probiotic such as Align or Culturelle or eat yogurt with live cultures (Activia is one example). This will lessen the chances of stomach upset.     Continue Albuterol inhaler and Mucinex.     Recheck with primary doctor next week to ensure resolution.     Return here or go to ER for sudden worsening or new concerns.     You must understand that you've received an Urgent Care treatment only and that you may be released before all your medical problems are known or treated. You, the patient, will arrange for follow up care as instructed.    Follow up with your PCP or specialty clinic as directed if not improved or as needed. You can call [phone] to schedule an appointment with the appropriate provider.      You, the patient, will arrange for follow up care as instructed.     If your condition worsens or fails to improve we recommend that you receive another evaluation at the ER immediately or contact your PCP to discuss your concerns.     Patient aware of treatment plan and verbalized understanding.

## 2025-06-19 NOTE — PROGRESS NOTES
"Subjective:      Patient ID: Elham Arteaga is a 79 y.o. female.    Vitals:  height is 5' 2" (1.575 m) and weight is 68 kg (150 lb). Her oral temperature is 98.4 °F (36.9 °C). Her blood pressure is 136/76 and her pulse is 102. Her respiration is 18 and oxygen saturation is 97%.     Chief Complaint: Cough    79/F, symptoms began 6/17/25. They include productive cough, chest/nasal congestion, low grade fever (99.7), nausea and fatigue. Taking Mucinex with mild relief. She is concerned she has pneumonia - has severe chest congestion, hears rattling in chest. Grandson sick with cough/congestion.    H/o asthma. Also has RA. Did 1st Dupixent injection about 3 weeks ago for eosinophilic esophagitis.     Cough  This is a new problem. The current episode started in the past 7 days. The problem has been gradually worsening. The problem occurs every few minutes. The cough is Productive of sputum. Associated symptoms include a fever and nasal congestion. Pertinent negatives include no chest pain, chills, ear pain, eye redness, headaches, heartburn, hemoptysis, myalgias, rash, sore throat, shortness of breath or wheezing. Nothing aggravates the symptoms. Treatments tried: Mucinex. The treatment provided mild relief. Her past medical history is significant for asthma.       Constitution: Positive for fever. Negative for chills, sweating, fatigue and unexpected weight change.   HENT:  Positive for congestion. Negative for ear pain, drooling, sore throat, trouble swallowing and voice change.    Neck: Negative for neck pain, neck stiffness, painful lymph nodes and neck swelling.   Cardiovascular:  Negative for chest pain, leg swelling, palpitations, sob on exertion and passing out.   Eyes:  Negative for eye pain, eye redness, photophobia, double vision and blurred vision.   Respiratory:  Positive for cough and sputum production. Negative for chest tightness, bloody sputum, shortness of breath, stridor and wheezing.  "   Gastrointestinal:  Negative for abdominal pain, abdominal bloating, nausea, vomiting, constipation, diarrhea and heartburn.   Musculoskeletal:  Negative for joint pain, joint swelling, back pain, muscle cramps and muscle ache.   Skin:  Negative for rash and hives.   Allergic/Immunologic: Negative for hives and itching.   Neurological:  Negative for dizziness, light-headedness, passing out, loss of balance, headaches, altered mental status, loss of consciousness and seizures.   Hematologic/Lymphatic: Negative for swollen lymph nodes.   Psychiatric/Behavioral:  Negative for altered mental status and nervous/anxious. The patient is not nervous/anxious.       Objective:     Physical Exam   Constitutional: She is oriented to person, place, and time. She appears well-developed. She is cooperative.  Non-toxic appearance. She does not appear ill. No distress.   HENT:   Head: Normocephalic and atraumatic.   Ears:   Right Ear: Hearing, tympanic membrane, external ear and ear canal normal.   Left Ear: Hearing, tympanic membrane, external ear and ear canal normal.   Nose: Rhinorrhea and congestion present. No mucosal edema or nasal deformity. No epistaxis. Right sinus exhibits no maxillary sinus tenderness and no frontal sinus tenderness. Left sinus exhibits no maxillary sinus tenderness and no frontal sinus tenderness.   Mouth/Throat: Uvula is midline, oropharynx is clear and moist and mucous membranes are normal. Mucous membranes are moist. No trismus in the jaw. Normal dentition. No uvula swelling. No oropharyngeal exudate, posterior oropharyngeal edema or posterior oropharyngeal erythema.   Eyes: Conjunctivae and lids are normal. No scleral icterus.   Neck: Trachea normal and phonation normal. Neck supple. No edema present. No erythema present. No neck rigidity present.   Cardiovascular: Normal rate, regular rhythm, normal heart sounds and normal pulses.   No murmur heard.  Pulmonary/Chest: Effort normal. No respiratory  distress. She has no decreased breath sounds. She has no wheezes. She has no rhonchi. She has rales (left lung base).   Abdominal: Normal appearance.   Musculoskeletal: Normal range of motion.         General: No deformity. Normal range of motion.   Neurological: She is alert and oriented to person, place, and time. She exhibits normal muscle tone. Coordination normal.   Skin: Skin is warm, dry, intact, not diaphoretic and not pale.   Psychiatric: Her speech is normal and behavior is normal. Judgment and thought content normal.   Nursing note and vitals reviewed.    Results for orders placed or performed in visit on 06/19/25   SARS Coronavirus 2 Antigen, POCT Manual Read    Collection Time: 06/19/25 10:51 AM   Result Value Ref Range    SARS Coronavirus 2 Antigen Negative Negative, Presumptive Negative     Acceptable Yes         Assessment:     1. Pneumonia of left lower lobe due to infectious organism    2. Cough, unspecified type    3. Respiratory crackles at left lung base        Plan:     COVID neg.     CXR done due to focal crackles to left lung base.    Per my preliminary reading of the X-ray, there was blunting of left costophrenic angle concerning for pneumonia. I discussed this with the patient and advised that the final radiology report will be available in [facility]. I also let them know that if any changes to the treatment plan are needed based on that final read, I will follow up via [facility] or by phone.    Will treat with dual antibiotic therapy. Has PCN allergy but can tolerate cephalosporins (nausea to Ceftin). Discussed Cefdinir + Doxy but she says Doxy gives her thrush so will do Azithromycin. No obvious contraindications.     Continue albuterol and Mucinex.     Schedule PCP f/u next week.     Recheck here for new concerns. To ED for sudden worsening.      Pneumonia of left lower lobe due to infectious organism  -     cefdinir (OMNICEF) 300 MG capsule; Take 1 capsule (300 mg total) by  mouth 2 (two) times daily. for 7 days  Dispense: 14 capsule; Refill: 0  -     azithromycin (Z-LAUREN) 250 MG tablet; Take 2 tablets by mouth on day 1; Take 1 tablet by mouth on days 2-5  Dispense: 6 tablet; Refill: 0    Cough, unspecified type  -     SARS Coronavirus 2 Antigen, POCT Manual Read  -     XR CHEST PA AND LATERAL; Future; Expected date: 06/19/2025    Respiratory crackles at left lung base  -     XR CHEST PA AND LATERAL; Future; Expected date: 06/19/2025    Other orders  -     ondansetron (ZOFRAN-ODT) 4 MG TbDL; Take 1 tablet (4 mg total) by mouth every 8 (eight) hours as needed (nausea/vomiting).  Dispense: 15 tablet; Refill: 0      Patient Instructions   Take antibiotic with food. While on antibiotics, take a daily probiotic such as Align or Culturelle or eat yogurt with live cultures (Activia is one example). This will lessen the chances of stomach upset.     Continue Albuterol inhaler and Mucinex.     Recheck with primary doctor next week to ensure resolution.     Return here or go to ER for sudden worsening or new concerns.     You must understand that you've received an Urgent Care treatment only and that you may be released before all your medical problems are known or treated. You, the patient, will arrange for follow up care as instructed.    Follow up with your PCP or specialty clinic as directed if not improved or as needed. You can call [phone] to schedule an appointment with the appropriate provider.      You, the patient, will arrange for follow up care as instructed.     If your condition worsens or fails to improve we recommend that you receive another evaluation at the ER immediately or contact your PCP to discuss your concerns.     Patient aware of treatment plan and verbalized understanding.

## 2025-06-24 ENCOUNTER — PATIENT MESSAGE (OUTPATIENT)
Dept: RHEUMATOLOGY | Facility: CLINIC | Age: 80
End: 2025-06-24
Payer: MEDICARE

## 2025-06-30 ENCOUNTER — TELEPHONE (OUTPATIENT)
Dept: RHEUMATOLOGY | Facility: CLINIC | Age: 80
End: 2025-06-30
Payer: MEDICARE

## 2025-06-30 NOTE — TELEPHONE ENCOUNTER
Copied from CRM #[id number]. Topic: Appointments - Appointment Rescheduling  >> Jun 30, 2025  8:55 AM Mariana wrote:  Type:  Patient Returning Call    Who Called:patient  Who Left Message for Patient:unknown   Does the patient know what this is regarding?:  Would the patient rather a call back or a response via [facility]? call  Best Call Back Number:[phone]  Additional Information: Patient states she missed a call from the office.  Thanks!

## 2025-07-03 ENCOUNTER — OFFICE VISIT (OUTPATIENT)
Dept: RHEUMATOLOGY | Facility: CLINIC | Age: 80
End: 2025-07-03
Payer: MEDICARE

## 2025-07-03 VITALS
HEIGHT: 62 IN | SYSTOLIC BLOOD PRESSURE: 147 MMHG | DIASTOLIC BLOOD PRESSURE: 75 MMHG | WEIGHT: 153.25 LBS | BODY MASS INDEX: 28.2 KG/M2 | HEART RATE: 109 BPM

## 2025-07-03 DIAGNOSIS — L40.50 PSA (PSORIATIC ARTHRITIS): ICD-10-CM

## 2025-07-03 DIAGNOSIS — M35.1 MCTD (MIXED CONNECTIVE TISSUE DISEASE): ICD-10-CM

## 2025-07-03 DIAGNOSIS — L23.7 POISON OAK: ICD-10-CM

## 2025-07-03 DIAGNOSIS — G89.4 CHRONIC PAIN SYNDROME: ICD-10-CM

## 2025-07-03 DIAGNOSIS — E53.8 B12 DEFICIENCY: ICD-10-CM

## 2025-07-03 DIAGNOSIS — E11.69 TYPE 2 DIABETES MELLITUS WITH OTHER SPECIFIED COMPLICATION, UNSPECIFIED WHETHER LONG TERM INSULIN USE: Primary | ICD-10-CM

## 2025-07-03 DIAGNOSIS — M06.00 SERONEGATIVE RHEUMATOID ARTHRITIS: ICD-10-CM

## 2025-07-03 PROCEDURE — 1159F MED LIST DOCD IN RCRD: CPT | Mod: CPTII,S$GLB,, | Performed by: INTERNAL MEDICINE

## 2025-07-03 PROCEDURE — 1125F AMNT PAIN NOTED PAIN PRSNT: CPT | Mod: CPTII,S$GLB,, | Performed by: INTERNAL MEDICINE

## 2025-07-03 PROCEDURE — 96372 THER/PROPH/DIAG INJ SC/IM: CPT | Mod: S$GLB,,, | Performed by: INTERNAL MEDICINE

## 2025-07-03 PROCEDURE — 1160F RVW MEDS BY RX/DR IN RCRD: CPT | Mod: CPTII,S$GLB,, | Performed by: INTERNAL MEDICINE

## 2025-07-03 PROCEDURE — 3288F FALL RISK ASSESSMENT DOCD: CPT | Mod: CPTII,S$GLB,, | Performed by: INTERNAL MEDICINE

## 2025-07-03 PROCEDURE — 3078F DIAST BP <80 MM HG: CPT | Mod: CPTII,S$GLB,, | Performed by: INTERNAL MEDICINE

## 2025-07-03 PROCEDURE — 99999 PR PBB SHADOW E&M-EST. PATIENT-LVL V: CPT | Mod: PBBFAC,,, | Performed by: INTERNAL MEDICINE

## 2025-07-03 PROCEDURE — 99215 OFFICE O/P EST HI 40 MIN: CPT | Mod: 25,S$GLB,, | Performed by: INTERNAL MEDICINE

## 2025-07-03 PROCEDURE — 3077F SYST BP >= 140 MM HG: CPT | Mod: CPTII,S$GLB,, | Performed by: INTERNAL MEDICINE

## 2025-07-03 PROCEDURE — 1101F PT FALLS ASSESS-DOCD LE1/YR: CPT | Mod: CPTII,S$GLB,, | Performed by: INTERNAL MEDICINE

## 2025-07-03 RX ORDER — NEEDLES, DISPOSABLE 25GX5/8"
1 NEEDLE, DISPOSABLE MISCELLANEOUS WEEKLY
Qty: 25 EACH | Refills: 3 | Status: SHIPPED | OUTPATIENT
Start: 2025-07-03

## 2025-07-03 RX ORDER — KETOROLAC TROMETHAMINE 30 MG/ML
60 INJECTION, SOLUTION INTRAMUSCULAR; INTRAVENOUS
Status: COMPLETED | OUTPATIENT
Start: 2025-07-03 | End: 2025-07-03

## 2025-07-03 RX ORDER — GUSELKUMAB 100 MG/ML
100 INJECTION SUBCUTANEOUS
Qty: 1 ML | Refills: 1 | Status: ACTIVE | OUTPATIENT
Start: 2025-07-03

## 2025-07-03 RX ORDER — CYANOCOBALAMIN 1000 UG/ML
1000 INJECTION, SOLUTION INTRAMUSCULAR; SUBCUTANEOUS
Qty: 10 ML | Refills: 3 | Status: SHIPPED | OUTPATIENT
Start: 2025-07-03

## 2025-07-03 RX ORDER — METHYLPREDNISOLONE ACETATE 80 MG/ML
80 INJECTION, SUSPENSION INTRA-ARTICULAR; INTRALESIONAL; INTRAMUSCULAR; SOFT TISSUE
Status: COMPLETED | OUTPATIENT
Start: 2025-07-03 | End: 2025-07-03

## 2025-07-03 RX ORDER — CYANOCOBALAMIN 1000 UG/ML
1000 INJECTION, SOLUTION INTRAMUSCULAR; SUBCUTANEOUS
Status: COMPLETED | OUTPATIENT
Start: 2025-07-03 | End: 2025-07-03

## 2025-07-03 RX ORDER — TIRZEPATIDE 2.5 MG/.5ML
2.5 INJECTION, SOLUTION SUBCUTANEOUS
Qty: 2 ML | Refills: 5 | Status: SHIPPED | OUTPATIENT
Start: 2025-07-03 | End: 2025-12-30

## 2025-07-03 RX ORDER — TRIAMCINOLONE ACETONIDE 1 MG/G
OINTMENT TOPICAL 2 TIMES DAILY
Qty: 30 G | Refills: 1 | Status: SHIPPED | OUTPATIENT
Start: 2025-07-03

## 2025-07-03 RX ORDER — GUSELKUMAB 100 MG/ML
100 INJECTION SUBCUTANEOUS
Qty: 1 ML | Refills: 6 | Status: ACTIVE | OUTPATIENT
Start: 2025-07-03

## 2025-07-03 RX ADMIN — METHYLPREDNISOLONE ACETATE 80 MG: 80 INJECTION, SUSPENSION INTRA-ARTICULAR; INTRALESIONAL; INTRAMUSCULAR; SOFT TISSUE at 02:07

## 2025-07-03 RX ADMIN — CYANOCOBALAMIN 1000 MCG: 1000 INJECTION, SOLUTION INTRAMUSCULAR; SUBCUTANEOUS at 02:07

## 2025-07-03 RX ADMIN — KETOROLAC TROMETHAMINE 60 MG: 30 INJECTION, SOLUTION INTRAMUSCULAR; INTRAVENOUS at 02:07

## 2025-07-03 ASSESSMENT — ROUTINE ASSESSMENT OF PATIENT INDEX DATA (RAPID3)
MDHAQ FUNCTION SCORE: 1.1
PAIN SCORE: 10
TOTAL RAPID3 SCORE: 6.72
PATIENT GLOBAL ASSESSMENT SCORE: 6.5
PSYCHOLOGICAL DISTRESS SCORE: 3.3

## 2025-07-03 NOTE — PROGRESS NOTES
Subjective:     Patient ID:  Elham Arteaga    Chief Complaint:  Disease Management     History of Present Illness:  Pt is a 79 y.o. female dx MCTD and seronegative RA. She went to the ER for a migraine and 1 week later had pneumonia.she was started on dupixent by Dr Joiner. She has EOE.she started her low dose prednisone and her blood glucose is high. She has increased joint pain in her hands L>R MCPs and PIPs. She is unable to make a fist due to swelling and pain. She reports low energy and hair loss as well. She feels mood is low because she can not be as active as she wishes. She has ongoing pain in her hips and knees as well. Added glucosamine MSM supplements.       She is s/p parathryoidectomy L on 8/23 with Dr. Dixon. She has not started treatment for osteoporosis yet.       She is taking tramadol only 1/2 on rare occasion and needs refill.      Current tx:  1. Naproxen prn  2. Robaxin prn     Prior tx:  1. Plaquenil  2. mobic   3. Rinvoq  4. Xeljanz  5. Actemra  6. Orencia  7. kevzara    Rheumatologic History:   - Diagnosis/es:  - Positive serologies:  - Infectious screening labs:  - Previous Treatments:  - Current Treatments:     Interval History:   Hospitalization since last office visit: No    Patient Active Problem List    Diagnosis Date Noted    Primary osteoarthritis of right knee 05/07/2025    Chronic fatigue 02/13/2025    Watering of right eye 10/30/2024    Statin intolerance 10/27/2023    History of parathyroid surgery 10/18/2023    RBBB 10/18/2023    Benign neoplasm of parathyroid gland 08/23/2023    Pure hypercholesterolemia 04/17/2023    Osteoporosis 04/17/2023    Chest pain 08/27/2022    Fibrocystic breast     Rheumatoid arthritis involving multiple sites with positive rheumatoid factor     Hyperparathyroidism 02/24/2022    Traumatic hematoma of buttock - right 10/28/2021    MCTD (mixed connective tissue disease) 04/05/2021    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    Paroxysmal supraventricular  tachycardia 2021    Leukocytosis 2020    MTHFR gene mutation 03/10/2020    Nodular thyroid disease 2020    Vitamin D deficiency 2019    Hypercalcemia 2019    Allergy to statin medication 2019    Reactive depression 2019    Atherosclerosis of abdominal aorta 2019    Osteopenia of multiple sites 2018    Mild intermittent asthma without complication 2018    Menopause 2018    Benign essential HTN 2018    Situational anxiety 2018    Hyperlipidemia 2018    Eosinophilic esophagitis 2018    GERD (gastroesophageal reflux disease) 2018    Hypothyroidism due to Hashimoto's thyroiditis 10/10/2016    Seasonal allergic rhinitis due to pollen 02/10/2016    Osteoarthritis of left hip 2015     Past Surgical History:   Procedure Laterality Date    214.9      ADENOIDECTOMY      ANKLE SURGERY  2009    cyst, left     SECTION, LOW TRANSVERSE      times 1    CHOLECYSTECTOMY      ESOPHAGOGASTRODUODENOSCOPY N/A 2019    Procedure: EGD (ESOPHAGOGASTRODUODENOSCOPY);  Surgeon: Brandon Negron MD;  Location: [facility];  Service: Endoscopy;  Laterality: N/A;    HAND SURGERY Left     trigger finger    HERNIA REPAIR      left inguinal    HYSTERECTOMY      OOPHORECTOMY      PARATHYROIDECTOMY Left 2023    Procedure: PARATHYROIDECTOMY;  Surgeon: Haile Dixon MD;  Location: [facility] OR;  Service: ENT;  Laterality: Left;    SHOULDER SURGERY Right 2022    dr ch rotator cuff    TONSILLECTOMY      UPPER GASTROINTESTINAL ENDOSCOPY      VAGINAL DELIVERY      times 2     Social History[1]  Family History   Problem Relation Name Age of Onset    Cancer Mother      Alcohol abuse Father       Review of patient's allergies indicates:   Allergen Reactions    Codeine Anaphylaxis and Nausea And Vomiting    Aciphex [rabeprazole] Other (See Comments)     Stomach pain    Ceftin [cefuroxime axetil] Nausea Only    Crestor  "[rosuvastatin] Other (See Comments)     Myalgia      Lisinopril Other (See Comments)     weakness    Promethazine Other (See Comments)     Unsure of reaction    Protonix [pantoprazole] Other (See Comments)     Abdominal pain    Eggs [egg derived] Other (See Comments)     "scarring esophagus"    Gluten protein     Kevzara [sarilumab] Other (See Comments)     Hair fell out and GI upset    Mobic [meloxicam]      Watery eyes, vaginal itching    Morphine Other (See Comments)     Hallucinations, angry, violent    Penicillins Hives    Plaquenil [hydroxychloroquine] Hives     Broke out in hives and whelps all over.    Soy     Sulfa (sulfonamide antibiotics) Other (See Comments)     Told no Sulfa drugs as a child    Wheat containing prod     Xeljanz [tofacitinib]      Dyspnea, change in thought processes.     Amlodipine Other (See Comments)     Sensitive, weakness    Belsomra [suvorexant] Other (See Comments)     nightmares    Elavil [amitriptyline] Other (See Comments)     nightmares    Lactose Nausea Only and Other (See Comments)    Prozac [fluoxetine] Other (See Comments)     Didn't work    Wellbutrin [bupropion hcl] Other (See Comments)     Didn't work       Review of Systems   Review of Systems   Constitutional:  Positive for chills and fatigue. Negative for activity change, appetite change, diaphoresis, fever and unexpected weight change.   HENT:  Negative for congestion, dental problem, ear discharge, ear pain, facial swelling, mouth sores, nosebleeds, postnasal drip, rhinorrhea, sinus pressure, sneezing, sore throat, tinnitus, trouble swallowing and voice change.    Eyes:  Negative for photophobia, pain, discharge, redness and itching.   Respiratory:  Negative for apnea, cough, chest tightness, shortness of breath and wheezing.    Cardiovascular:  Positive for leg swelling. Negative for chest pain and palpitations.   Gastrointestinal:  Positive for abdominal pain. Negative for abdominal distention, constipation, " "diarrhea, nausea and vomiting.   Endocrine: Negative for cold intolerance, heat intolerance, polydipsia and polyuria.   Genitourinary:  Negative for decreased urine volume, difficulty urinating, dysuria, flank pain, frequency, hematuria and urgency.   Musculoskeletal:  Positive for arthralgias, back pain, joint swelling, neck pain and neck stiffness. Negative for gait problem and myalgias.   Skin:  Negative for pallor, rash and wound.   Allergic/Immunologic: Positive for immunocompromised state.   Neurological:  Negative for dizziness, tremors, weakness, numbness and headaches.   Hematological:  Negative for adenopathy. Does not bruise/bleed easily.   Psychiatric/Behavioral:  Negative for sleep disturbance. The patient is not nervous/anxious.         Current Medications:  Current Outpatient Medications   Medication Instructions    acetaminophen (TYLENOL) 650 mg, 2 times daily PRN    albuterol (PROVENTIL/VENTOLIN HFA) 90 mcg/actuation inhaler 2 puffs, Inhalation, Every 6 hours PRN    ALPRAZolam (XANAX) 0.5 MG tablet TAKE 1 TABLET THREE TIMES DAILY AS NEEDED FOR INSOMNIA OR ANXIETY    amino acids (AMINO ACID ORAL) 200 mg, Every morning    ascorbic acid (vitamin C) (VITAMIN C) 1,000 mg, Daily    b complex vitamins capsule 1 capsule, Every morning    blunt needle, disposable 18 x 1 1/2 " Ndle 1 Syringe, Misc.(Non-Drug; Combo Route), Weekly    cloNIDine (CATAPRES) 0.1 mg, Oral, 3 times daily PRN    cyanocobalamin 1,000 mcg, Subcutaneous, Every 7 days    dupilumab (DUPIXENT PEN SUBQ) Inject into the skin.    EPINEPHrine (EPIPEN) 0.3 mg, Intramuscular, Once    ergocalciferol (ERGOCALCIFEROL) 50,000 Units, Oral, Twice weekly    fluticasone propionate (FLONASE) 50 mcg, Each Nostril, Daily    Lactobacillus acidophilus (PROBIOTIC ORAL) 1 Dose, Every morning    MAG CITRATE-POTASSIUM CITRATE ORAL Daily    MAGNESIUM ORAL Take by mouth.    methocarbamoL (ROBAXIN) 750 mg, 2 times daily PRN    MOUNJARO 2.5 mg, Subcutaneous, Every 7 " "days    multivit with calcium,iron,min (MULTIPLE VITAMIN, WOMENS ORAL) Daily    naproxen (NAPROSYN) 375 mg, 2 times daily with meals    needle, disp, 30 gauge 30 gauge x 1/2" Ndle 1 each, Misc.(Non-Drug; Combo Route), Weekly    ondansetron (ZOFRAN-ODT) 4 mg, Oral, Every 8 hours PRN    PAPAYA ENZYME ORAL 3 tablets, Daily PRN    pseudoephedrine HCl (SUDAFED ORAL) 2 tablets, Daily PRN    syringe, disposable, 1 mL Syrg 1 Syringe, Misc.(Non-Drug; Combo Route), Weekly    traMADoL (ULTRAM) 50 mg, Oral, Every 12 hours PRN    triamcinolone acetonide 0.1% (KENALOG) 0.1 % ointment Topical (Top), 2 times daily    vit A and D3 in cod liver oiL (COD LIVER OIL) 1,250-135 unit Cap 2 capsules, Daily    vitamin E acetate (VITAMIN E ORAL) Take by mouth.         Objective:     Vitals:    07/03/25 1320   BP: (!) 147/75   Pulse: 109   Weight: 69.5 kg (153 lb 3.5 oz)   Height: 5' 2.01" (1.575 m)   PainSc:   2      Body mass index is 28.02 kg/m².     Physical Examinations:  Physical Exam   Constitutional: She is oriented to person, place, and time. No distress.   HENT:   Head: Normocephalic and atraumatic.   Eyes: Pupils are equal, round, and reactive to light. Right eye exhibits no discharge. Left eye exhibits no discharge.   Neck: No thyromegaly present.   Cardiovascular: Normal rate, regular rhythm and normal heart sounds. Exam reveals no gallop and no friction rub.   No murmur heard.  Pulmonary/Chest: Breath sounds normal. She has no wheezes. She has no rales. She exhibits no tenderness.   Abdominal: There is no abdominal tenderness. There is no rebound and no guarding.   Musculoskeletal:         General: Tenderness and deformity present.      Right shoulder: Tenderness present.      Left shoulder: Tenderness present.      Right elbow: Normal.      Left elbow: Tenderness present.      Right wrist: Tenderness present.      Left wrist: Tenderness present.      Cervical back: Neck supple.      Right knee: Effusion present. Tenderness " present.      Left knee: Effusion present. Tenderness present.   Lymphadenopathy:     She has no cervical adenopathy.   Neurological: She is alert and oriented to person, place, and time. Gait normal.   Skin: Skin is dry. No rash noted. No erythema. There is pallor.   Psychiatric: Mood and affect normal.   Vitals reviewed.      Right Side Rheumatological Exam     Examination finds the elbow normal.    The patient is tender to palpation of the shoulder, wrist, knee, 1st PIP, 1st MCP, 2nd PIP, 2nd MCP, 3rd PIP, 3rd MCP, 4th PIP, 4th MCP, 5th PIP and 5th MCP    She has swelling of the 1st PIP, 1st MCP, 2nd PIP, 2nd MCP, 3rd PIP, 3rd MCP, 4th PIP, 4th MCP, 5th PIP and 5th MCP    Shoulder Exam   Tenderness Location: no tenderness    Range of Motion   Active abduction:  abnormal   Adduction: abnormal  Sensation: normal    Knee Exam   Patellofemoral Crepitus: positive  Effusion: positive  Sensation: normal    Hip Exam   Tenderness Location: posterior  Sensation: normal    Elbow/Wrist Exam   Tenderness Location: no tenderness  Sensation: normal    Left Side Rheumatological Exam     The patient is tender to palpation of the shoulder, elbow, wrist, knee, 1st PIP, 1st MCP, 2nd PIP, 2nd MCP, 3rd PIP, 3rd MCP, 4th PIP, 4th MCP, 5th PIP and 5th MCP.    She has swelling of the 1st PIP, 1st MCP, 2nd PIP, 2nd MCP, 3rd PIP, 3rd MCP, 4th PIP, 4th MCP, 5th PIP and 5th MCP    Shoulder Exam   Tenderness Location: no tenderness    Range of Motion   Active abduction:  abnormal   Sensation: normal    Knee Exam     Patellofemoral Crepitus: positive  Effusion: positive  Sensation: normal    Hip Exam   Tenderness Location: posterior  Sensation: normal    Elbow/Wrist Exam   Sensation: normal      Back/Neck Exam   General Inspection   Gait: normal            Disease Assessment Scores:  Patient's Global Assessment of arthritis (0-10): 2  Physician's Global Assessment of arthritis (0-10): 1  Number of Tender Joints (0-28): 1  Number of Swollen  "Joints (0-28): 1         No data to display                Monitoring Lab Results:  Lab Results   Component Value Date    WBC 9.42 06/01/2025    RBC 4.64 06/01/2025    HGB 13.0 06/01/2025    HCT 40.2 06/01/2025    MCV 87 06/01/2025    MCH 28.0 06/01/2025    MCHC 32.3 06/01/2025    RDW 13.9 06/01/2025     06/01/2025        Lab Results   Component Value Date     06/01/2025    K 4.2 06/01/2025     06/01/2025    CO2 23 06/01/2025     (H) 06/01/2025    BUN 17 06/01/2025    CREATININE 0.82 06/01/2025    CALCIUM 9.0 06/01/2025    PROT 7.1 06/01/2025    ALBUMIN 4.0 06/01/2025    BILITOT 0.5 06/01/2025    ALKPHOS 111 06/01/2025    AST 25 06/01/2025    ALT 17 06/01/2025    ANIONGAP 10 06/01/2025    EGFRNORACEVR >60 06/01/2025       Lab Results   Component Value Date    SEDRATE 30 (H) 06/01/2025    CRP 10.5 (H) 07/03/2024        Lab Results   Component Value Date    GSHRZDXH66UZ 46 03/17/2025        Lab Results   Component Value Date    CHOL 247 (H) 11/11/2024    HDL 57 11/11/2024    LDLCALC 161.4 (H) 11/11/2024    TRIG 143 11/11/2024       Lab Results   Component Value Date    RF <13.0 01/26/2024    CCPANTIBODIE 0.5 01/26/2024     Lab Results   Component Value Date    ANASCREEN Positive (A) 01/28/2025    ANATITER 1:160 01/28/2025    DSDNA Negative 1:10 01/28/2025    SMRNPAB 3 06/15/2020    SSAANTIBODY 4 06/15/2020    SSBANTIBODY 0 06/15/2020    LNU69IT 2 06/15/2020     No results found for: "HLABB27"    Infectious Disease Screening:  Lab Results   Component Value Date    HEPBSAG Non-reactive 07/10/2024    HEPBCAB Non-reactive 07/10/2024    HEPBSAB Negative 04/05/2021     Lab Results   Component Value Date    HEPCAB Non-reactive 07/10/2024     Lab Results   Component Value Date    TBGOLDPLUS Negative 07/10/2024     No results found for: "QUANTIFERON", "SVCMT", "QUANTAGVALUE", "QUANTNILVALU", "QUANTMITOGEN", "QFTTBAG", "QINT"     Imaging: DEXA, Xrays, MRIs, CTs, etc    Old & Outside Medical " Records:  Reviewed old and all outside medical records available in Care Everywhere     Assessment:     Encounter Diagnoses   Name Primary?    Type 2 diabetes mellitus with other specified complication, unspecified whether long term insulin use Yes    Poison oak     MCTD (mixed connective tissue disease)     Seronegative rheumatoid arthritis     B12 deficiency     Chronic pain syndrome     PSA (psoriatic arthritis)         Plan:      Encounter Diagnoses   Name Primary?    Type 2 diabetes mellitus with other specified complication, unspecified whether long term insulin use Yes    Poison oak     MCTD (mixed connective tissue disease)     Seronegative rheumatoid arthritis     B12 deficiency     Chronic pain syndrome     PSA (psoriatic arthritis)      Elham was seen today for disease management.    Diagnoses and all orders for this visit:    Type 2 diabetes mellitus with other specified complication, unspecified whether long term insulin use  -     methylPREDNISolone acetate injection 80 mg  -     tirzepatide (MOUNJARO) 2.5 mg/0.5 mL PnIj; Inject 2.5 mg into the skin every 7 days.  -     cyanocobalamin injection 1,000 mcg  -     ketorolac injection 60 mg  -     Sedimentation rate; Future  -     C-Reactive Protein; Future  -     Comprehensive Metabolic Panel; Future  -     CBC Auto Differential; Future  -     Anti-Thyroglobulin Antibody; Future  -     T4, Free; Future  -     Thyroid Peroxidase Antibody; Future  -     TSH; Future  -     T3, Free; Future  -     PTH, Intact; Future    Poison oak  -     methylPREDNISolone acetate injection 80 mg  -     triamcinolone acetonide 0.1% (KENALOG) 0.1 % ointment; Apply topically 2 (two) times daily.  -     cyanocobalamin injection 1,000 mcg  -     ketorolac injection 60 mg  -     Sedimentation rate; Future  -     C-Reactive Protein; Future  -     Comprehensive Metabolic Panel; Future  -     CBC Auto Differential; Future  -     Anti-Thyroglobulin Antibody; Future  -     T4, Free;  "Future  -     Thyroid Peroxidase Antibody; Future  -     TSH; Future  -     T3, Free; Future  -     PTH, Intact; Future    MCTD (mixed connective tissue disease)  -     cyanocobalamin injection 1,000 mcg  -     ketorolac injection 60 mg  -     Sedimentation rate; Future  -     C-Reactive Protein; Future  -     Comprehensive Metabolic Panel; Future  -     CBC Auto Differential; Future  -     Anti-Thyroglobulin Antibody; Future  -     T4, Free; Future  -     Thyroid Peroxidase Antibody; Future  -     TSH; Future  -     T3, Free; Future  -     PTH, Intact; Future    Seronegative rheumatoid arthritis  -     cyanocobalamin injection 1,000 mcg  -     ketorolac injection 60 mg  -     Sedimentation rate; Future  -     C-Reactive Protein; Future  -     Comprehensive Metabolic Panel; Future  -     CBC Auto Differential; Future  -     Anti-Thyroglobulin Antibody; Future  -     T4, Free; Future  -     Thyroid Peroxidase Antibody; Future  -     TSH; Future  -     T3, Free; Future  -     PTH, Intact; Future    B12 deficiency  -     cyanocobalamin 1,000 mcg/mL injection; Inject 1 mL (1,000 mcg total) into the skin every 7 days.  -     syringe, disposable, 1 mL Syrg; 1 Syringe by Misc.(Non-Drug; Combo Route) route once a week.  -     blunt needle, disposable 18 x 1 1/2 " Ndle; 1 Syringe by Misc.(Non-Drug; Combo Route) route once a week.  -     needle, disp, 30 gauge 30 gauge x 1/2" Ndle; 1 each by Misc.(Non-Drug; Combo Route) route once a week.  -     Sedimentation rate; Future  -     C-Reactive Protein; Future  -     Comprehensive Metabolic Panel; Future  -     CBC Auto Differential; Future  -     Anti-Thyroglobulin Antibody; Future  -     T4, Free; Future  -     Thyroid Peroxidase Antibody; Future  -     TSH; Future  -     T3, Free; Future  -     PTH, Intact; Future    Chronic pain syndrome  -     Sedimentation rate; Future  -     C-Reactive Protein; Future  -     Comprehensive Metabolic Panel; Future  -     CBC Auto " Differential; Future  -     Anti-Thyroglobulin Antibody; Future  -     T4, Free; Future  -     Thyroid Peroxidase Antibody; Future  -     TSH; Future  -     T3, Free; Future  -     PTH, Intact; Future    PSA (psoriatic arthritis)  -     Sedimentation rate; Future  -     C-Reactive Protein; Future  -     Comprehensive Metabolic Panel; Future  -     CBC Auto Differential; Future  -     Anti-Thyroglobulin Antibody; Future  -     T4, Free; Future  -     Thyroid Peroxidase Antibody; Future  -     TSH; Future  -     T3, Free; Future  -     PTH, Intact; Future          1. Steroid cream and Im injection  2. Mounjaro for her dm   3. Labs ordered     Follow-up 6 months  More than 50% of the  40 minute encounter was spent face to face counseling the patient regarding current status and future plan of care as well as side effects  of the medications. All questions were answered to patient's satisfaction also includes  non-face to face time preparing to see the patient (eg, review of tests), Obtaining and/or reviewing separately obtained history, Documenting clinical information in the electronic or other health record, Independently interpreting results            [1]   Social History  Tobacco Use    Smoking status: Never    Smokeless tobacco: Never   Substance Use Topics    Alcohol use: Yes     Comment: rarely    Drug use: Never

## 2025-07-08 PROBLEM — L40.50 PSA (PSORIATIC ARTHRITIS): Status: ACTIVE | Noted: 2025-07-08

## 2025-07-17 ENCOUNTER — LAB VISIT (OUTPATIENT)
Dept: LAB | Facility: HOSPITAL | Age: 80
End: 2025-07-17
Attending: INTERNAL MEDICINE
Payer: MEDICARE

## 2025-07-17 DIAGNOSIS — G89.4 CHRONIC PAIN SYNDROME: ICD-10-CM

## 2025-07-17 DIAGNOSIS — E53.8 B12 DEFICIENCY: ICD-10-CM

## 2025-07-17 DIAGNOSIS — L40.50 PSA (PSORIATIC ARTHRITIS): ICD-10-CM

## 2025-07-17 DIAGNOSIS — M06.00 SERONEGATIVE RHEUMATOID ARTHRITIS: ICD-10-CM

## 2025-07-17 DIAGNOSIS — M35.1 MCTD (MIXED CONNECTIVE TISSUE DISEASE): ICD-10-CM

## 2025-07-17 DIAGNOSIS — E11.69 TYPE 2 DIABETES MELLITUS WITH OTHER SPECIFIED COMPLICATION, UNSPECIFIED WHETHER LONG TERM INSULIN USE: ICD-10-CM

## 2025-07-17 DIAGNOSIS — L23.7 POISON OAK: ICD-10-CM

## 2025-07-17 LAB
ABSOLUTE EOSINOPHIL (OHS): 0.34 K/UL
ABSOLUTE MONOCYTE (OHS): 0.61 K/UL (ref 0.3–1)
ABSOLUTE NEUTROPHIL COUNT (OHS): 6.07 K/UL (ref 1.8–7.7)
ALBUMIN SERPL BCP-MCNC: 4.2 G/DL (ref 3.5–5.2)
ALP SERPL-CCNC: 121 UNIT/L (ref 40–150)
ALT SERPL W/O P-5'-P-CCNC: 14 UNIT/L (ref 10–44)
ANION GAP (OHS): 13 MMOL/L (ref 8–16)
AST SERPL-CCNC: 18 UNIT/L (ref 11–45)
BASOPHILS # BLD AUTO: 0.05 K/UL
BASOPHILS NFR BLD AUTO: 0.5 %
BILIRUB SERPL-MCNC: 0.6 MG/DL (ref 0.1–1)
BUN SERPL-MCNC: 16 MG/DL (ref 8–23)
CALCIUM SERPL-MCNC: 10.5 MG/DL (ref 8.7–10.5)
CHLORIDE SERPL-SCNC: 105 MMOL/L (ref 95–110)
CO2 SERPL-SCNC: 25 MMOL/L (ref 23–29)
CREAT SERPL-MCNC: 1.1 MG/DL (ref 0.5–1.4)
CRP SERPL-MCNC: 12.9 MG/L
ERYTHROCYTE [DISTWIDTH] IN BLOOD BY AUTOMATED COUNT: 13.9 % (ref 11.5–14.5)
ERYTHROCYTE [SEDIMENTATION RATE] IN BLOOD BY PHOTOMETRIC METHOD: 12 MM/HR
GFR SERPLBLD CREATININE-BSD FMLA CKD-EPI: 51 ML/MIN/1.73/M2
GLUCOSE SERPL-MCNC: 113 MG/DL (ref 70–110)
HCT VFR BLD AUTO: 46.2 % (ref 37–48.5)
HGB BLD-MCNC: 14.3 GM/DL (ref 12–16)
IMM GRANULOCYTES # BLD AUTO: 0.07 K/UL (ref 0–0.04)
IMM GRANULOCYTES NFR BLD AUTO: 0.7 % (ref 0–0.5)
LYMPHOCYTES # BLD AUTO: 2.56 K/UL (ref 1–4.8)
MCH RBC QN AUTO: 27.6 PG (ref 27–31)
MCHC RBC AUTO-ENTMCNC: 31 G/DL (ref 32–36)
MCV RBC AUTO: 89 FL (ref 82–98)
NUCLEATED RBC (/100WBC) (OHS): 0 /100 WBC
PLATELET # BLD AUTO: 343 K/UL (ref 150–450)
PMV BLD AUTO: 9.8 FL (ref 9.2–12.9)
POTASSIUM SERPL-SCNC: 5.5 MMOL/L (ref 3.5–5.1)
PROT SERPL-MCNC: 7.9 GM/DL (ref 6–8.4)
PTH-INTACT SERPL-MCNC: 40.1 PG/ML (ref 9–77)
RBC # BLD AUTO: 5.18 M/UL (ref 4–5.4)
RELATIVE EOSINOPHIL (OHS): 3.5 %
RELATIVE LYMPHOCYTE (OHS): 26.4 % (ref 18–48)
RELATIVE MONOCYTE (OHS): 6.3 % (ref 4–15)
RELATIVE NEUTROPHIL (OHS): 62.6 % (ref 38–73)
SODIUM SERPL-SCNC: 143 MMOL/L (ref 136–145)
T3FREE SERPL-MCNC: 3.2 PG/ML (ref 2.3–4.2)
T4 FREE SERPL-MCNC: 0.92 NG/DL (ref 0.71–1.51)
THYROGLOB AB SERPL IA-ACNC: <4 IU/ML
THYROPEROXIDASE IGG SERPL-ACNC: <6 IU/ML
TSH SERPL-ACNC: 4.05 UIU/ML (ref 0.4–4)
WBC # BLD AUTO: 9.7 K/UL (ref 3.9–12.7)

## 2025-07-17 PROCEDURE — 85652 RBC SED RATE AUTOMATED: CPT

## 2025-07-17 PROCEDURE — 86800 THYROGLOBULIN ANTIBODY: CPT

## 2025-07-17 PROCEDURE — 84443 ASSAY THYROID STIM HORMONE: CPT

## 2025-07-17 PROCEDURE — 84481 FREE ASSAY (FT-3): CPT

## 2025-07-17 PROCEDURE — 83970 ASSAY OF PARATHORMONE: CPT

## 2025-07-17 PROCEDURE — 86140 C-REACTIVE PROTEIN: CPT

## 2025-07-17 PROCEDURE — 84439 ASSAY OF FREE THYROXINE: CPT

## 2025-07-17 PROCEDURE — 36415 COLL VENOUS BLD VENIPUNCTURE: CPT | Mod: PO

## 2025-07-17 PROCEDURE — 82947 ASSAY GLUCOSE BLOOD QUANT: CPT

## 2025-07-17 PROCEDURE — 86376 MICROSOMAL ANTIBODY EACH: CPT

## 2025-07-17 PROCEDURE — 85025 COMPLETE CBC W/AUTO DIFF WBC: CPT

## 2025-07-18 ENCOUNTER — TELEPHONE (OUTPATIENT)
Dept: RHEUMATOLOGY | Facility: CLINIC | Age: 80
End: 2025-07-18
Payer: MEDICARE

## 2025-07-18 NOTE — TELEPHONE ENCOUNTER
Copied from CRM #[id number]. Topic: Medications - Medication Authorization  >> Jul 18, 2025 12:20 PM Sierra wrote:  Type:  Pharmacy Calling to Clarify an RX    Name of Caller:Jae ford/Jose Clinical Review Team  Best Call Back Number: [phone]  Fax: [phone]  / Ref #: [id number]  Prescription Name: tirzepatide (MOUNJARO) 2.5 mg/0.5 mL PnIj  What do they need to clarify?: Non formulary Authorization / Need Medical Necessity / Why is it the best Rx for pt? / Jae states the information is need by the end of the month... Please call to advise... Thank you...